# Patient Record
Sex: FEMALE | Race: WHITE | NOT HISPANIC OR LATINO
[De-identification: names, ages, dates, MRNs, and addresses within clinical notes are randomized per-mention and may not be internally consistent; named-entity substitution may affect disease eponyms.]

---

## 2017-02-02 ENCOUNTER — APPOINTMENT (OUTPATIENT)
Dept: INTERNAL MEDICINE | Facility: CLINIC | Age: 67
End: 2017-02-02

## 2017-02-02 VITALS
BODY MASS INDEX: 24.45 KG/M2 | DIASTOLIC BLOOD PRESSURE: 77 MMHG | HEIGHT: 63 IN | WEIGHT: 138 LBS | SYSTOLIC BLOOD PRESSURE: 116 MMHG | HEART RATE: 71 BPM

## 2017-02-22 ENCOUNTER — APPOINTMENT (OUTPATIENT)
Dept: INTERNAL MEDICINE | Facility: CLINIC | Age: 67
End: 2017-02-22

## 2017-03-10 ENCOUNTER — APPOINTMENT (OUTPATIENT)
Dept: HEMATOLOGY ONCOLOGY | Facility: CLINIC | Age: 67
End: 2017-03-10

## 2017-03-10 VITALS
RESPIRATION RATE: 14 BRPM | SYSTOLIC BLOOD PRESSURE: 121 MMHG | HEART RATE: 68 BPM | TEMPERATURE: 96.7 F | DIASTOLIC BLOOD PRESSURE: 69 MMHG | BODY MASS INDEX: 24.45 KG/M2 | HEIGHT: 63 IN | WEIGHT: 138 LBS

## 2017-03-10 DIAGNOSIS — Z80.2 FAMILY HISTORY OF MALIGNANT NEOPLASM OF OTHER RESPIRATORY AND INTRATHORACIC ORGANS: ICD-10-CM

## 2017-05-22 ENCOUNTER — OUTPATIENT (OUTPATIENT)
Dept: OUTPATIENT SERVICES | Facility: HOSPITAL | Age: 67
LOS: 1 days | Discharge: HOME | End: 2017-05-22

## 2017-05-22 ENCOUNTER — APPOINTMENT (OUTPATIENT)
Dept: HEMATOLOGY ONCOLOGY | Facility: CLINIC | Age: 67
End: 2017-05-22

## 2017-05-22 VITALS
HEIGHT: 63 IN | HEART RATE: 73 BPM | SYSTOLIC BLOOD PRESSURE: 128 MMHG | RESPIRATION RATE: 14 BRPM | TEMPERATURE: 97.8 F | BODY MASS INDEX: 24.45 KG/M2 | DIASTOLIC BLOOD PRESSURE: 72 MMHG | WEIGHT: 138 LBS

## 2017-05-22 DIAGNOSIS — K92.2 GASTROINTESTINAL HEMORRHAGE, UNSPECIFIED: ICD-10-CM

## 2017-06-26 ENCOUNTER — APPOINTMENT (OUTPATIENT)
Dept: BREAST CENTER | Facility: CLINIC | Age: 67
End: 2017-06-26

## 2017-06-26 VITALS
SYSTOLIC BLOOD PRESSURE: 110 MMHG | DIASTOLIC BLOOD PRESSURE: 82 MMHG | WEIGHT: 138 LBS | BODY MASS INDEX: 24.45 KG/M2 | HEIGHT: 63 IN

## 2017-06-26 DIAGNOSIS — C50.919 MALIGNANT NEOPLASM OF UNSPECIFIED SITE OF UNSPECIFIED FEMALE BREAST: ICD-10-CM

## 2017-06-28 DIAGNOSIS — Z13.820 ENCOUNTER FOR SCREENING FOR OSTEOPOROSIS: ICD-10-CM

## 2017-06-28 DIAGNOSIS — Z78.0 ASYMPTOMATIC MENOPAUSAL STATE: ICD-10-CM

## 2017-06-28 DIAGNOSIS — M89.9 DISORDER OF BONE, UNSPECIFIED: ICD-10-CM

## 2017-06-28 DIAGNOSIS — F17.200 NICOTINE DEPENDENCE, UNSPECIFIED, UNCOMPLICATED: ICD-10-CM

## 2017-07-13 ENCOUNTER — RX RENEWAL (OUTPATIENT)
Age: 67
End: 2017-07-13

## 2017-07-24 ENCOUNTER — OUTPATIENT (OUTPATIENT)
Dept: OUTPATIENT SERVICES | Facility: HOSPITAL | Age: 67
LOS: 1 days | Discharge: HOME | End: 2017-07-24

## 2017-07-24 ENCOUNTER — APPOINTMENT (OUTPATIENT)
Dept: INTERNAL MEDICINE | Facility: CLINIC | Age: 67
End: 2017-07-24

## 2017-07-24 VITALS
HEART RATE: 65 BPM | SYSTOLIC BLOOD PRESSURE: 116 MMHG | HEIGHT: 63 IN | BODY MASS INDEX: 23.57 KG/M2 | DIASTOLIC BLOOD PRESSURE: 73 MMHG | WEIGHT: 133 LBS

## 2017-07-24 DIAGNOSIS — K92.2 GASTROINTESTINAL HEMORRHAGE, UNSPECIFIED: ICD-10-CM

## 2017-07-24 DIAGNOSIS — H43.391 OTHER VITREOUS OPACITIES, RIGHT EYE: ICD-10-CM

## 2017-07-25 LAB
HCV AB S/CO SERPL IA: 0.1 S/CO
HCV AB SER QL: NONREACTIVE

## 2017-08-11 ENCOUNTER — OUTPATIENT (OUTPATIENT)
Dept: OUTPATIENT SERVICES | Facility: HOSPITAL | Age: 67
LOS: 1 days | Discharge: HOME | End: 2017-08-11

## 2017-08-11 DIAGNOSIS — K92.2 GASTROINTESTINAL HEMORRHAGE, UNSPECIFIED: ICD-10-CM

## 2017-08-14 ENCOUNTER — APPOINTMENT (OUTPATIENT)
Dept: PODIATRY | Facility: CLINIC | Age: 67
End: 2017-08-14

## 2017-08-14 ENCOUNTER — OUTPATIENT (OUTPATIENT)
Dept: OUTPATIENT SERVICES | Facility: HOSPITAL | Age: 67
LOS: 1 days | Discharge: HOME | End: 2017-08-14

## 2017-08-14 VITALS
HEIGHT: 63 IN | BODY MASS INDEX: 23.57 KG/M2 | SYSTOLIC BLOOD PRESSURE: 129 MMHG | WEIGHT: 133 LBS | DIASTOLIC BLOOD PRESSURE: 78 MMHG | HEART RATE: 62 BPM

## 2017-08-14 DIAGNOSIS — G89.29 PAIN IN LEFT FOOT: ICD-10-CM

## 2017-08-14 DIAGNOSIS — L84 CORNS AND CALLOSITIES: ICD-10-CM

## 2017-08-14 DIAGNOSIS — M21.612 BUNION OF LEFT FOOT: ICD-10-CM

## 2017-08-14 DIAGNOSIS — K92.2 GASTROINTESTINAL HEMORRHAGE, UNSPECIFIED: ICD-10-CM

## 2017-08-14 DIAGNOSIS — M79.672 PAIN IN LEFT FOOT: ICD-10-CM

## 2017-08-14 RX ORDER — NYSTATIN 100000 [USP'U]/G
100000 CREAM TOPICAL
Qty: 3 | Refills: 10 | Status: ACTIVE | COMMUNITY
Start: 2017-08-14 | End: 1900-01-01

## 2017-08-15 DIAGNOSIS — L84 CORNS AND CALLOSITIES: ICD-10-CM

## 2017-08-15 DIAGNOSIS — M79.672 PAIN IN LEFT FOOT: ICD-10-CM

## 2017-08-15 DIAGNOSIS — B35.3 TINEA PEDIS: ICD-10-CM

## 2017-08-15 DIAGNOSIS — M21.612 BUNION OF LEFT FOOT: ICD-10-CM

## 2017-08-17 ENCOUNTER — OUTPATIENT (OUTPATIENT)
Dept: OUTPATIENT SERVICES | Facility: HOSPITAL | Age: 67
LOS: 1 days | Discharge: HOME | End: 2017-08-17

## 2017-08-17 ENCOUNTER — APPOINTMENT (OUTPATIENT)
Dept: HEMATOLOGY ONCOLOGY | Facility: CLINIC | Age: 67
End: 2017-08-17

## 2017-08-17 VITALS
WEIGHT: 134 LBS | TEMPERATURE: 96.1 F | BODY MASS INDEX: 23.74 KG/M2 | SYSTOLIC BLOOD PRESSURE: 124 MMHG | RESPIRATION RATE: 14 BRPM | DIASTOLIC BLOOD PRESSURE: 69 MMHG | HEIGHT: 63 IN | HEART RATE: 64 BPM

## 2017-08-21 DIAGNOSIS — C50.111 MALIGNANT NEOPLASM OF CENTRAL PORTION OF RIGHT FEMALE BREAST: ICD-10-CM

## 2017-09-06 ENCOUNTER — APPOINTMENT (OUTPATIENT)
Dept: PODIATRY | Facility: CLINIC | Age: 67
End: 2017-09-06

## 2017-09-06 ENCOUNTER — OUTPATIENT (OUTPATIENT)
Dept: OUTPATIENT SERVICES | Facility: HOSPITAL | Age: 67
LOS: 1 days | Discharge: HOME | End: 2017-09-06

## 2017-09-06 VITALS
WEIGHT: 134 LBS | HEIGHT: 63 IN | SYSTOLIC BLOOD PRESSURE: 118 MMHG | DIASTOLIC BLOOD PRESSURE: 76 MMHG | BODY MASS INDEX: 23.74 KG/M2 | HEART RATE: 66 BPM

## 2017-09-06 DIAGNOSIS — B35.1 TINEA UNGUIUM: ICD-10-CM

## 2017-09-06 DIAGNOSIS — B35.3 TINEA PEDIS: ICD-10-CM

## 2017-09-06 DIAGNOSIS — K92.2 GASTROINTESTINAL HEMORRHAGE, UNSPECIFIED: ICD-10-CM

## 2017-10-27 ENCOUNTER — APPOINTMENT (OUTPATIENT)
Dept: GASTROENTEROLOGY | Facility: CLINIC | Age: 67
End: 2017-10-27

## 2017-10-27 ENCOUNTER — OUTPATIENT (OUTPATIENT)
Dept: OUTPATIENT SERVICES | Facility: HOSPITAL | Age: 67
LOS: 1 days | Discharge: HOME | End: 2017-10-27

## 2017-10-27 VITALS
BODY MASS INDEX: 23.92 KG/M2 | WEIGHT: 135 LBS | SYSTOLIC BLOOD PRESSURE: 113 MMHG | HEIGHT: 63 IN | DIASTOLIC BLOOD PRESSURE: 70 MMHG | HEART RATE: 65 BPM

## 2017-10-27 DIAGNOSIS — Z12.11 ENCOUNTER FOR SCREENING FOR MALIGNANT NEOPLASM OF COLON: ICD-10-CM

## 2017-10-27 DIAGNOSIS — K92.2 GASTROINTESTINAL HEMORRHAGE, UNSPECIFIED: ICD-10-CM

## 2017-10-27 RX ORDER — POLYETHYLENE GLYCOL 3350 AND ELECTROLYTES WITH LEMON FLAVOR 236; 22.74; 6.74; 5.86; 2.97 G/4L; G/4L; G/4L; G/4L; G/4L
236 POWDER, FOR SOLUTION ORAL
Qty: 1 | Refills: 0 | Status: ACTIVE | COMMUNITY
Start: 2017-10-27 | End: 1900-01-01

## 2017-10-30 ENCOUNTER — OUTPATIENT (OUTPATIENT)
Dept: OUTPATIENT SERVICES | Facility: HOSPITAL | Age: 67
LOS: 1 days | Discharge: HOME | End: 2017-10-30

## 2017-10-30 DIAGNOSIS — R92.8 OTHER ABNORMAL AND INCONCLUSIVE FINDINGS ON DIAGNOSTIC IMAGING OF BREAST: ICD-10-CM

## 2017-10-30 DIAGNOSIS — K92.2 GASTROINTESTINAL HEMORRHAGE, UNSPECIFIED: ICD-10-CM

## 2017-10-30 DIAGNOSIS — Z12.31 ENCOUNTER FOR SCREENING MAMMOGRAM FOR MALIGNANT NEOPLASM OF BREAST: ICD-10-CM

## 2017-12-13 ENCOUNTER — OUTPATIENT (OUTPATIENT)
Dept: OUTPATIENT SERVICES | Facility: HOSPITAL | Age: 67
LOS: 1 days | Discharge: HOME | End: 2017-12-13

## 2017-12-13 DIAGNOSIS — K92.2 GASTROINTESTINAL HEMORRHAGE, UNSPECIFIED: ICD-10-CM

## 2017-12-18 DIAGNOSIS — D12.8 BENIGN NEOPLASM OF RECTUM: ICD-10-CM

## 2017-12-18 DIAGNOSIS — Z12.11 ENCOUNTER FOR SCREENING FOR MALIGNANT NEOPLASM OF COLON: ICD-10-CM

## 2017-12-18 DIAGNOSIS — D12.4 BENIGN NEOPLASM OF DESCENDING COLON: ICD-10-CM

## 2017-12-18 DIAGNOSIS — K55.20 ANGIODYSPLASIA OF COLON WITHOUT HEMORRHAGE: ICD-10-CM

## 2017-12-18 DIAGNOSIS — K64.8 OTHER HEMORRHOIDS: ICD-10-CM

## 2017-12-18 DIAGNOSIS — D12.5 BENIGN NEOPLASM OF SIGMOID COLON: ICD-10-CM

## 2017-12-20 ENCOUNTER — INPATIENT (INPATIENT)
Facility: HOSPITAL | Age: 67
LOS: 2 days | Discharge: HOME | End: 2017-12-23
Attending: HOSPITALIST

## 2017-12-20 DIAGNOSIS — K92.2 GASTROINTESTINAL HEMORRHAGE, UNSPECIFIED: ICD-10-CM

## 2017-12-20 DIAGNOSIS — K55.21 ANGIODYSPLASIA OF COLON WITH HEMORRHAGE: ICD-10-CM

## 2017-12-20 DIAGNOSIS — F17.210 NICOTINE DEPENDENCE, CIGARETTES, UNCOMPLICATED: ICD-10-CM

## 2017-12-20 DIAGNOSIS — Z86.010 PERSONAL HISTORY OF COLONIC POLYPS: ICD-10-CM

## 2017-12-20 DIAGNOSIS — K63.5 POLYP OF COLON: ICD-10-CM

## 2017-12-27 DIAGNOSIS — K92.2 GASTROINTESTINAL HEMORRHAGE, UNSPECIFIED: ICD-10-CM

## 2017-12-27 DIAGNOSIS — K64.8 OTHER HEMORRHOIDS: ICD-10-CM

## 2017-12-27 DIAGNOSIS — Z85.3 PERSONAL HISTORY OF MALIGNANT NEOPLASM OF BREAST: ICD-10-CM

## 2017-12-27 DIAGNOSIS — F17.200 NICOTINE DEPENDENCE, UNSPECIFIED, UNCOMPLICATED: ICD-10-CM

## 2017-12-27 DIAGNOSIS — D62 ACUTE POSTHEMORRHAGIC ANEMIA: ICD-10-CM

## 2017-12-27 DIAGNOSIS — Z96.642 PRESENCE OF LEFT ARTIFICIAL HIP JOINT: ICD-10-CM

## 2018-01-01 ENCOUNTER — INPATIENT (INPATIENT)
Facility: HOSPITAL | Age: 68
LOS: 5 days | Discharge: HOME | End: 2018-01-07
Attending: INTERNAL MEDICINE | Admitting: DERMATOLOGY

## 2018-01-01 DIAGNOSIS — K92.2 GASTROINTESTINAL HEMORRHAGE, UNSPECIFIED: ICD-10-CM

## 2018-01-02 DIAGNOSIS — K55.20 ANGIODYSPLASIA OF COLON WITHOUT HEMORRHAGE: ICD-10-CM
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
## 2018-01-02 DIAGNOSIS — K91.840 POSTPROCEDURAL HEMORRHAGE OF A DIGESTIVE SYSTEM ORGAN OR STRUCTURE FOLLOWING A DIGESTIVE SYSTEM PROCEDURE: ICD-10-CM

## 2018-01-11 ENCOUNTER — INPATIENT (INPATIENT)
Facility: HOSPITAL | Age: 68
LOS: 7 days | Discharge: HOME | End: 2018-01-19
Attending: INTERNAL MEDICINE

## 2018-01-11 DIAGNOSIS — R50.82 POSTPROCEDURAL FEVER: ICD-10-CM

## 2018-01-11 DIAGNOSIS — K92.2 GASTROINTESTINAL HEMORRHAGE, UNSPECIFIED: ICD-10-CM

## 2018-01-11 DIAGNOSIS — J98.11 ATELECTASIS: ICD-10-CM

## 2018-01-11 DIAGNOSIS — Z79.82 LONG TERM (CURRENT) USE OF ASPIRIN: ICD-10-CM

## 2018-01-11 DIAGNOSIS — F17.210 NICOTINE DEPENDENCE, CIGARETTES, UNCOMPLICATED: ICD-10-CM

## 2018-01-11 DIAGNOSIS — Z90.710 ACQUIRED ABSENCE OF BOTH CERVIX AND UTERUS: ICD-10-CM

## 2018-01-11 DIAGNOSIS — Z85.3 PERSONAL HISTORY OF MALIGNANT NEOPLASM OF BREAST: ICD-10-CM

## 2018-01-11 DIAGNOSIS — K29.80 DUODENITIS WITHOUT BLEEDING: ICD-10-CM

## 2018-01-11 DIAGNOSIS — Z96.642 PRESENCE OF LEFT ARTIFICIAL HIP JOINT: ICD-10-CM

## 2018-01-11 DIAGNOSIS — L98.8 OTHER SPECIFIED DISORDERS OF THE SKIN AND SUBCUTANEOUS TISSUE: ICD-10-CM

## 2018-01-11 DIAGNOSIS — K63.89 OTHER SPECIFIED DISEASES OF INTESTINE: ICD-10-CM

## 2018-01-11 DIAGNOSIS — K55.21 ANGIODYSPLASIA OF COLON WITH HEMORRHAGE: ICD-10-CM

## 2018-01-11 DIAGNOSIS — K44.9 DIAPHRAGMATIC HERNIA WITHOUT OBSTRUCTION OR GANGRENE: ICD-10-CM

## 2018-01-11 DIAGNOSIS — K22.2 ESOPHAGEAL OBSTRUCTION: ICD-10-CM

## 2018-01-11 DIAGNOSIS — Z86.010 PERSONAL HISTORY OF COLONIC POLYPS: ICD-10-CM

## 2018-01-11 DIAGNOSIS — K29.70 GASTRITIS, UNSPECIFIED, WITHOUT BLEEDING: ICD-10-CM

## 2018-01-11 DIAGNOSIS — D62 ACUTE POSTHEMORRHAGIC ANEMIA: ICD-10-CM

## 2018-01-17 ENCOUNTER — APPOINTMENT (OUTPATIENT)
Dept: SURGERY | Facility: CLINIC | Age: 68
End: 2018-01-17

## 2018-01-17 ENCOUNTER — APPOINTMENT (OUTPATIENT)
Dept: INTERNAL MEDICINE | Facility: CLINIC | Age: 68
End: 2018-01-17

## 2018-01-23 ENCOUNTER — INPATIENT (INPATIENT)
Facility: HOSPITAL | Age: 68
LOS: 8 days | Discharge: HOME | End: 2018-02-01
Attending: SURGERY

## 2018-01-25 ENCOUNTER — APPOINTMENT (OUTPATIENT)
Dept: SURGERY | Facility: CLINIC | Age: 68
End: 2018-01-25

## 2018-01-25 DIAGNOSIS — K92.2 GASTROINTESTINAL HEMORRHAGE, UNSPECIFIED: ICD-10-CM

## 2018-01-25 DIAGNOSIS — L98.9 DISORDER OF THE SKIN AND SUBCUTANEOUS TISSUE, UNSPECIFIED: ICD-10-CM

## 2018-01-25 DIAGNOSIS — K29.80 DUODENITIS WITHOUT BLEEDING: ICD-10-CM

## 2018-01-25 DIAGNOSIS — K55.20 ANGIODYSPLASIA OF COLON WITHOUT HEMORRHAGE: ICD-10-CM

## 2018-01-25 DIAGNOSIS — K64.0 FIRST DEGREE HEMORRHOIDS: ICD-10-CM

## 2018-01-25 DIAGNOSIS — K22.10 ULCER OF ESOPHAGUS WITHOUT BLEEDING: ICD-10-CM

## 2018-01-25 DIAGNOSIS — R23.4 CHANGES IN SKIN TEXTURE: ICD-10-CM

## 2018-01-25 DIAGNOSIS — K29.70 GASTRITIS, UNSPECIFIED, WITHOUT BLEEDING: ICD-10-CM

## 2018-01-25 DIAGNOSIS — B36.9 SUPERFICIAL MYCOSIS, UNSPECIFIED: ICD-10-CM

## 2018-01-25 DIAGNOSIS — Z85.3 PERSONAL HISTORY OF MALIGNANT NEOPLASM OF BREAST: ICD-10-CM

## 2018-01-25 DIAGNOSIS — Z96.642 PRESENCE OF LEFT ARTIFICIAL HIP JOINT: ICD-10-CM

## 2018-01-25 DIAGNOSIS — K44.9 DIAPHRAGMATIC HERNIA WITHOUT OBSTRUCTION OR GANGRENE: ICD-10-CM

## 2018-01-25 DIAGNOSIS — Z79.82 LONG TERM (CURRENT) USE OF ASPIRIN: ICD-10-CM

## 2018-01-25 DIAGNOSIS — D62 ACUTE POSTHEMORRHAGIC ANEMIA: ICD-10-CM

## 2018-01-25 DIAGNOSIS — Z86.010 PERSONAL HISTORY OF COLONIC POLYPS: ICD-10-CM

## 2018-01-25 DIAGNOSIS — F17.200 NICOTINE DEPENDENCE, UNSPECIFIED, UNCOMPLICATED: ICD-10-CM

## 2018-01-25 DIAGNOSIS — K22.2 ESOPHAGEAL OBSTRUCTION: ICD-10-CM

## 2018-01-29 PROBLEM — B35.3 TINEA PEDIS OF BOTH FEET: Status: ACTIVE | Noted: 2017-08-14

## 2018-02-02 ENCOUNTER — APPOINTMENT (OUTPATIENT)
Dept: INTERNAL MEDICINE | Facility: CLINIC | Age: 68
End: 2018-02-02

## 2018-02-04 DIAGNOSIS — K92.2 GASTROINTESTINAL HEMORRHAGE, UNSPECIFIED: ICD-10-CM

## 2018-02-05 DIAGNOSIS — F17.210 NICOTINE DEPENDENCE, CIGARETTES, UNCOMPLICATED: ICD-10-CM

## 2018-02-05 DIAGNOSIS — Z79.82 LONG TERM (CURRENT) USE OF ASPIRIN: ICD-10-CM
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
## 2018-02-05 DIAGNOSIS — D62 ACUTE POSTHEMORRHAGIC ANEMIA: ICD-10-CM

## 2018-02-05 DIAGNOSIS — K62.5 HEMORRHAGE OF ANUS AND RECTUM: ICD-10-CM

## 2018-02-05 DIAGNOSIS — Z85.3 PERSONAL HISTORY OF MALIGNANT NEOPLASM OF BREAST: ICD-10-CM

## 2018-02-05 DIAGNOSIS — T81.30XA DISRUPTION OF WOUND, UNSPECIFIED, INITIAL ENCOUNTER: ICD-10-CM

## 2018-02-05 DIAGNOSIS — K91.840 POSTPROCEDURAL HEMORRHAGE OF A DIGESTIVE SYSTEM ORGAN OR STRUCTURE FOLLOWING A DIGESTIVE SYSTEM PROCEDURE: ICD-10-CM

## 2018-02-05 DIAGNOSIS — Z96.642 PRESENCE OF LEFT ARTIFICIAL HIP JOINT: ICD-10-CM

## 2018-02-05 DIAGNOSIS — R57.1 HYPOVOLEMIC SHOCK: ICD-10-CM

## 2018-02-12 DIAGNOSIS — K62.5 HEMORRHAGE OF ANUS AND RECTUM: ICD-10-CM

## 2018-02-22 ENCOUNTER — OUTPATIENT (OUTPATIENT)
Dept: OUTPATIENT SERVICES | Facility: HOSPITAL | Age: 68
LOS: 1 days | Discharge: HOME | End: 2018-02-22

## 2018-02-22 ENCOUNTER — APPOINTMENT (OUTPATIENT)
Dept: HEMATOLOGY ONCOLOGY | Facility: CLINIC | Age: 68
End: 2018-02-22

## 2018-02-22 VITALS
WEIGHT: 130 LBS | TEMPERATURE: 96.6 F | HEIGHT: 63 IN | RESPIRATION RATE: 14 BRPM | SYSTOLIC BLOOD PRESSURE: 104 MMHG | HEART RATE: 68 BPM | BODY MASS INDEX: 23.04 KG/M2 | DIASTOLIC BLOOD PRESSURE: 67 MMHG

## 2018-02-27 ENCOUNTER — APPOINTMENT (OUTPATIENT)
Dept: SURGERY | Facility: CLINIC | Age: 68
End: 2018-02-27
Payer: MEDICARE

## 2018-02-27 VITALS
HEIGHT: 63 IN | WEIGHT: 131 LBS | SYSTOLIC BLOOD PRESSURE: 112 MMHG | DIASTOLIC BLOOD PRESSURE: 70 MMHG | BODY MASS INDEX: 23.21 KG/M2

## 2018-02-27 DIAGNOSIS — K64.9 UNSPECIFIED HEMORRHOIDS: ICD-10-CM

## 2018-02-27 DIAGNOSIS — K62.5 HEMORRHAGE OF ANUS AND RECTUM: ICD-10-CM

## 2018-02-27 DIAGNOSIS — C50.111 MALIGNANT NEOPLASM OF CENTRAL PORTION OF RIGHT FEMALE BREAST: ICD-10-CM

## 2018-02-27 PROCEDURE — 99024 POSTOP FOLLOW-UP VISIT: CPT

## 2018-03-01 PROBLEM — K64.9 HEMORRHOIDS: Status: ACTIVE | Noted: 2018-03-01

## 2018-03-01 PROBLEM — K62.5 RECTAL BLEEDING: Status: ACTIVE | Noted: 2018-03-01

## 2018-03-09 ENCOUNTER — APPOINTMENT (OUTPATIENT)
Dept: GASTROENTEROLOGY | Facility: CLINIC | Age: 68
End: 2018-03-09

## 2018-04-30 ENCOUNTER — OUTPATIENT (OUTPATIENT)
Dept: OUTPATIENT SERVICES | Facility: HOSPITAL | Age: 68
LOS: 1 days | Discharge: HOME | End: 2018-04-30

## 2018-04-30 DIAGNOSIS — R92.8 OTHER ABNORMAL AND INCONCLUSIVE FINDINGS ON DIAGNOSTIC IMAGING OF BREAST: ICD-10-CM

## 2018-05-11 ENCOUNTER — APPOINTMENT (OUTPATIENT)
Dept: GASTROENTEROLOGY | Facility: CLINIC | Age: 68
End: 2018-05-11

## 2018-05-14 ENCOUNTER — RX RENEWAL (OUTPATIENT)
Age: 68
End: 2018-05-14

## 2018-05-14 RX ORDER — ANASTROZOLE TABLETS 1 MG/1
1 TABLET ORAL DAILY
Qty: 90 | Refills: 1 | Status: ACTIVE | COMMUNITY
Start: 2017-03-10 | End: 1900-01-01

## 2018-05-21 ENCOUNTER — INPATIENT (INPATIENT)
Facility: HOSPITAL | Age: 68
LOS: 16 days | Discharge: ANOTHER HOSPITAL | End: 2018-06-07
Attending: INTERNAL MEDICINE | Admitting: INTERNAL MEDICINE
Payer: MEDICARE

## 2018-05-21 VITALS
TEMPERATURE: 98 F | HEART RATE: 90 BPM | RESPIRATION RATE: 18 BRPM | SYSTOLIC BLOOD PRESSURE: 150 MMHG | DIASTOLIC BLOOD PRESSURE: 67 MMHG | OXYGEN SATURATION: 100 %

## 2018-05-21 DIAGNOSIS — Z96.642 PRESENCE OF LEFT ARTIFICIAL HIP JOINT: Chronic | ICD-10-CM

## 2018-05-21 DIAGNOSIS — Z98.890 OTHER SPECIFIED POSTPROCEDURAL STATES: Chronic | ICD-10-CM

## 2018-05-21 LAB
ANION GAP SERPL CALC-SCNC: 13 MMOL/L — SIGNIFICANT CHANGE UP (ref 7–14)
APTT BLD: 22.6 SEC — CRITICAL LOW (ref 27–39.2)
BASOPHILS # BLD AUTO: 0.02 K/UL — SIGNIFICANT CHANGE UP (ref 0–0.2)
BASOPHILS NFR BLD AUTO: 0.5 % — SIGNIFICANT CHANGE UP (ref 0–1)
BLD GP AB SCN SERPL QL: (no result)
BUN SERPL-MCNC: 10 MG/DL — SIGNIFICANT CHANGE UP (ref 10–20)
CALCIUM SERPL-MCNC: 9.8 MG/DL — SIGNIFICANT CHANGE UP (ref 8.5–10.1)
CHLORIDE SERPL-SCNC: 99 MMOL/L — SIGNIFICANT CHANGE UP (ref 98–110)
CK MB CFR SERPL CALC: <0.1 NG/ML — LOW (ref 0.6–6.3)
CK SERPL-CCNC: 42 U/L — SIGNIFICANT CHANGE UP (ref 0–225)
CO2 SERPL-SCNC: 27 MMOL/L — SIGNIFICANT CHANGE UP (ref 17–32)
CREAT SERPL-MCNC: 0.7 MG/DL — SIGNIFICANT CHANGE UP (ref 0.7–1.5)
D DIMER BLD IA.RAPID-MCNC: 1598 NG/ML DDU — HIGH (ref 0–230)
EOSINOPHIL # BLD AUTO: 0.1 K/UL — SIGNIFICANT CHANGE UP (ref 0–0.7)
EOSINOPHIL NFR BLD AUTO: 2.7 % — SIGNIFICANT CHANGE UP (ref 0–8)
GLUCOSE SERPL-MCNC: 99 MG/DL — SIGNIFICANT CHANGE UP (ref 70–99)
HCT VFR BLD CALC: 16.1 % — LOW (ref 37–47)
HCT VFR BLD CALC: 18.7 % — LOW (ref 37–47)
HGB BLD-MCNC: 5.2 G/DL — CRITICAL LOW (ref 12–16)
HGB BLD-MCNC: 5.8 G/DL — CRITICAL LOW (ref 12–16)
IMM GRANULOCYTES NFR BLD AUTO: 17.8 % — HIGH (ref 0.1–0.3)
INR BLD: 1.19 RATIO — SIGNIFICANT CHANGE UP (ref 0.65–1.3)
LYMPHOCYTES # BLD AUTO: 1.32 K/UL — SIGNIFICANT CHANGE UP (ref 1.2–3.4)
LYMPHOCYTES # BLD AUTO: 36.1 % — SIGNIFICANT CHANGE UP (ref 20.5–51.1)
MCHC RBC-ENTMCNC: 28 PG — SIGNIFICANT CHANGE UP (ref 27–31)
MCHC RBC-ENTMCNC: 29.9 PG — SIGNIFICANT CHANGE UP (ref 27–31)
MCHC RBC-ENTMCNC: 31 G/DL — LOW (ref 32–37)
MCHC RBC-ENTMCNC: 32.3 G/DL — SIGNIFICANT CHANGE UP (ref 32–37)
MCV RBC AUTO: 90.3 FL — SIGNIFICANT CHANGE UP (ref 81–99)
MCV RBC AUTO: 92.5 FL — SIGNIFICANT CHANGE UP (ref 81–99)
MONOCYTES # BLD AUTO: 0.1 K/UL — SIGNIFICANT CHANGE UP (ref 0.1–0.6)
MONOCYTES NFR BLD AUTO: 2.7 % — SIGNIFICANT CHANGE UP (ref 1.7–9.3)
NEUTROPHILS # BLD AUTO: 1.47 K/UL — SIGNIFICANT CHANGE UP (ref 1.4–6.5)
NEUTROPHILS NFR BLD AUTO: 40.2 % — LOW (ref 42.2–75.2)
NRBC # BLD: 13 /100 WBCS — HIGH (ref 0–0)
NT-PROBNP SERPL-SCNC: 246 PG/ML — SIGNIFICANT CHANGE UP (ref 0–300)
PLATELET # BLD AUTO: 117 K/UL — LOW (ref 130–400)
PLATELET # BLD AUTO: 128 K/UL — LOW (ref 130–400)
POTASSIUM SERPL-MCNC: 4.4 MMOL/L — SIGNIFICANT CHANGE UP (ref 3.5–5)
POTASSIUM SERPL-SCNC: 4.4 MMOL/L — SIGNIFICANT CHANGE UP (ref 3.5–5)
PROTHROM AB SERPL-ACNC: 12.9 SEC — HIGH (ref 9.95–12.87)
RBC # BLD: 1.74 M/UL — LOW (ref 4.2–5.4)
RBC # BLD: 1.74 M/UL — LOW (ref 4.2–5.4)
RBC # BLD: 2.07 M/UL — LOW (ref 4.2–5.4)
RBC # FLD: 21.5 % — HIGH (ref 11.5–14.5)
RBC # FLD: 22.1 % — HIGH (ref 11.5–14.5)
RETICS #: 104.6 K/UL — SIGNIFICANT CHANGE UP (ref 25–125)
RETICS/RBC NFR: 6 % — HIGH (ref 0.5–1.5)
SODIUM SERPL-SCNC: 139 MMOL/L — SIGNIFICANT CHANGE UP (ref 135–146)
TROPONIN T SERPL-MCNC: <0.01 NG/ML — SIGNIFICANT CHANGE UP
TYPE + AB SCN PNL BLD: SIGNIFICANT CHANGE UP
WBC # BLD: 3.66 K/UL — LOW (ref 4.8–10.8)
WBC # BLD: 3.98 K/UL — LOW (ref 4.8–10.8)
WBC # FLD AUTO: 3.66 K/UL — LOW (ref 4.8–10.8)
WBC # FLD AUTO: 3.98 K/UL — LOW (ref 4.8–10.8)

## 2018-05-21 RX ORDER — ACETAMINOPHEN 500 MG
650 TABLET ORAL ONCE
Qty: 0 | Refills: 0 | Status: COMPLETED | OUTPATIENT
Start: 2018-05-21 | End: 2018-05-21

## 2018-05-21 RX ORDER — ACETAMINOPHEN 500 MG
650 TABLET ORAL EVERY 6 HOURS
Qty: 0 | Refills: 0 | Status: DISCONTINUED | OUTPATIENT
Start: 2018-05-21 | End: 2018-05-25

## 2018-05-21 RX ORDER — ANASTROZOLE 1 MG/1
1 TABLET ORAL DAILY
Qty: 0 | Refills: 0 | Status: DISCONTINUED | OUTPATIENT
Start: 2018-05-22 | End: 2018-06-07

## 2018-05-21 RX ORDER — BUDESONIDE AND FORMOTEROL FUMARATE DIHYDRATE 160; 4.5 UG/1; UG/1
2 AEROSOL RESPIRATORY (INHALATION)
Qty: 0 | Refills: 0 | Status: DISCONTINUED | OUTPATIENT
Start: 2018-05-21 | End: 2018-06-07

## 2018-05-21 RX ORDER — PANTOPRAZOLE SODIUM 20 MG/1
40 TABLET, DELAYED RELEASE ORAL
Qty: 0 | Refills: 0 | Status: DISCONTINUED | OUTPATIENT
Start: 2018-05-21 | End: 2018-05-23

## 2018-05-21 RX ORDER — ALBUTEROL 90 UG/1
2.5 AEROSOL, METERED ORAL EVERY 6 HOURS
Qty: 0 | Refills: 0 | Status: DISCONTINUED | OUTPATIENT
Start: 2018-05-21 | End: 2018-06-07

## 2018-05-21 RX ADMIN — Medication 650 MILLIGRAM(S): at 18:13

## 2018-05-21 RX ADMIN — BUDESONIDE AND FORMOTEROL FUMARATE DIHYDRATE 2 PUFF(S): 160; 4.5 AEROSOL RESPIRATORY (INHALATION) at 23:57

## 2018-05-21 RX ADMIN — Medication 650 MILLIGRAM(S): at 23:30

## 2018-05-21 RX ADMIN — Medication 650 MILLIGRAM(S): at 23:58

## 2018-05-21 NOTE — ED PROVIDER NOTE - OBJECTIVE STATEMENT
cough x 2 weeks. Non productive. No fever or chills or SOB. Pt stopped smoking 4 weeks ago. Also has sharp left CP that is constant x 3 days. Pain is not worsened with coughing, ROM or exertion. Denies leg pain or swelling.

## 2018-05-21 NOTE — ED PROVIDER NOTE - ATTENDING CONTRIBUTION TO CARE
67 F with hx of GI bleed due to polyp removal, transfusion, breast CA in remission on daily maintenance chemo, recent ex smoker here with cough, and upper L upper chest pain. No leg pain or swelling, no fever, no recent travel, no cardiac hx/HTN/DM.   exam nonrevealing, no skin changes, no chest tenderness. Labs CXR EKG and reeval

## 2018-05-21 NOTE — ED ADULT NURSE NOTE - OBJECTIVE STATEMENT
Pt c/o cough for about 4 months since quitting smoking. Also c/o pain to left upper chest for several months. Pt denies SOB/headache/dizziness.

## 2018-05-21 NOTE — H&P ADULT - ASSESSMENT
66 yo female patient with PMHx of right breast cancer (Well to moderately differentiated invasive ductal carcinoma ER+, [place]+, Her-2 neg, stage T1c, N0, M0) s/p lumpectomy around 1.5 years ago, followed by radiation therapy and currently maintained on anastrazole, history of lower GI bleeding back in January 2018 which was attributed to hemorrhoids, s/p hemorrhoidectomy and fissurotomy, currently presented because of worsening generalized weakness, dyspnea on exertion and cough was found to have a Hg 5.8.    Admitted to medicine for management and evaluation    # Working diagnosis: Profound symptomatic anemia  Patient was found to be Pancytopenic.  Patient has history of breast cancer, currently on anastrozole, and has history of lower GI bleeding few months ago, and was found to have positive guaiac in ED.  Anemia can have many etiologies in this specific patient:  1- Can be secondary to chronic slow GI losses (positive guaiac, history of hemorrhoids, diverticulosis, and gastritis)  2- Can be secondary to anastrozole (<10% chance of having anemia and leukopenia)  3- Can be secondary to malignancy (infiltration of bone marrow by malignant cells)  4- Can be MDS since patient is pancytopenic?  5- Can be vitamin deficiency (possibly B12) - can cause pancytopenia not only anemia (On a previous CBC back in january patient was found to have macrocytic anemia with MVC>100 and on the peripheral smear it said r/o b12 deficiency)  6- Can be other causes of anemia/pancytopenia such as hypothyroidism    Will need to transfuse patient for a Hg>7 ; but blood bank called saying she had positive antibodies and they have no blood currently available for her.  We need to send two pink tubes outside (to [place] blood bank) in order to check for compatible blood. Meanwhile will keep patient monitored, if any signs of instability, will have to transfuse group O- uncrossed blood. But for now patient is asymptomatic while at rest, no shortness of breath, no dizziness. She is not tachycardic nor hypotensive.  Will need to get GI and hematology/oncology on board for evaluation  I will send blood for TSH, iron studies, vit b12, folate, SPEP, UPEP, ESR, peripheral smear, retic count    # Cough and exertional shortness of breath  1- SOB can be because of the anemia  2- Patient might have non diagnosed COPD (chronic smoking 50 pack year, stopped only 4 months ago)  3- possible acute bronchitis / viral upper resp tract infection  Will continue with symptomatic treatment only. no indication of antibiotics. will give some nebulizers and assess for symptoms improvement.  Consider Pulmonary consult (can be done outpatient) for PFTs r/o copd    # History of buttock skin lesion s/p excision during hemorrhoidectomy surgery  Pathology showing malignancy of undetermined origin, clean margins of excision  Needs to follow up with oncology and address this issue too    # Breast cancer s/p lumpectomy and currently on anastrozole  continue with anastrozole for now unless oncology would stop it for now.  Supposidly patient was in remission, but on the current CT chest there was some axillary lymph nodes. Patient complaining of pain at the site.  R/O recurrence of disease, might require biopsy. evaluate by oncology, consider surgery consult if biopsy needed.  Might need new staging work-up    # DVT proph (encourage ambulation, sequentials) no heparin, possible GI losses  GI prophylaxis  Regular diet  ambulate as tolerated  Full code  dispo home 68 yo female patient with PMHx of right breast cancer (Well to moderately differentiated invasive ductal carcinoma ER+, [place]+, Her-2 neg, stage T1c, N0, M0) s/p lumpectomy around 1.5 years ago, followed by radiation therapy and currently maintained on anastrazole, history of lower GI bleeding back in January 2018 which was attributed to hemorrhoids, s/p hemorrhoidectomy and fissurotomy, currently presented because of worsening generalized weakness, dyspnea on exertion and cough was found to have a Hg 5.8.    Admitted to medicine for management and evaluation    # Working diagnosis: Profound symptomatic anemia  Patient was found to be Pancytopenic.  Patient has history of breast cancer, currently on anastrozole, and has history of lower GI bleeding few months ago, and was found to have positive guaiac in ED.  Anemia can have many etiologies in this specific patient:  1- Can be secondary to chronic slow GI losses (positive guaiac, history of hemorrhoids, diverticulosis, and gastritis)  2- Can be secondary to anastrozole (<10% chance of having anemia and leukopenia)  3- Can be secondary to malignancy (infiltration of bone marrow by malignant cells)  4- Can be MDS since patient is pancytopenic?  5- Can be vitamin deficiency (possibly B12) - can cause pancytopenia not only anemia (On a previous CBC back in january patient was found to have macrocytic anemia with MVC>100 and on the peripheral smear it said r/o b12 deficiency)  6- Can be other causes of anemia/pancytopenia such as hypothyroidism    Will need to transfuse patient for a Hg>7 ; but blood bank called saying she had positive antibodies and they have no blood currently available for her.  We need to send two pink tubes outside (to [place] blood bank) in order to check for compatible blood. Meanwhile will keep patient monitored, if any signs of instability, will have to transfuse group O- uncrossed blood. But for now patient is asymptomatic while at rest, no shortness of breath, no dizziness. She is not tachycardic nor hypotensive.  Will need to get GI and hematology/oncology on board for evaluation  I will send blood for TSH, iron studies, vit b12, folate, SPEP, UPEP, ESR, peripheral smear, retic count    # Cough and exertional shortness of breath  1- SOB can be because of the anemia  2- Patient might have non diagnosed COPD (chronic smoking 50 pack year, stopped only 4 months ago)  3- possible acute bronchitis / viral upper resp tract infection  Will continue with symptomatic treatment only. no indication of antibiotics. will give some nebulizers and assess for symptoms improvement.  Consider Pulmonary consult (can be done outpatient) for PFTs r/o copd    # History of buttock skin lesion s/p excision during hemorrhoidectomy surgery  Pathology showing malignancy of undetermined origin, clean margins of excision  Needs to follow up with oncology and address this issue too    # Breast cancer s/p lumpectomy and currently on anastrozole  continue with anastrozole for now unless oncology would stop it for now.  Supposidly patient was in remission, but on the current CT chest there was some axillary lymph nodes. Patient complaining of pain at the site.  R/O recurrence of disease, might require biopsy. evaluate by oncology, consider surgery consult if biopsy needed.  Might need new staging work-up  Note that patient is complaining of headaches, consider MRI brain r/o mets    # DVT proph (encourage ambulation, sequentials) no heparin, possible GI losses  GI prophylaxis  Regular diet  ambulate as tolerated  Full code  dispo home

## 2018-05-21 NOTE — H&P ADULT - NSHPREVIEWOFSYSTEMS_GEN_ALL_CORE
No fever chills, no rhinorrhea, no sore throat, no chest pain, no abdominal pain, no nausea vomiting or diarrhea, no  symptoms. No melena  Positive for headaches, cough, exertional SOB, generalized weakness, left armpit and left chest pain

## 2018-05-21 NOTE — ED PROVIDER NOTE - NS ED ROS FT
CONST: No fever, chills or bodyaches  EYES: No pain, redness, drainage or visual changes.  ENT: No ear pain or discharge, nasal discharge or congestion. No sore throat    GI: No abdominal pain, N/V/D  MS: No joint pain, back pain or extremity pain/injury  SKIN: No rashes  NEURO: No headache, dizziness, paresthesias or LOC

## 2018-05-21 NOTE — H&P ADULT - NSHPSOCIALHISTORY_GEN_ALL_CORE
Social History:  Substance Use (street drugs): ( x ) never used  (  ) other:  Tobacco Usage:  (   ) never smoked   ( x ) former smoker   (   ) current smoker  (  52   ) pack year  (   4 months ago     ) last cigarette date  Alcohol Usage: negative

## 2018-05-21 NOTE — ED ADULT NURSE REASSESSMENT NOTE - NS ED NURSE REASSESS COMMENT FT1
Call to blood bank requesting availability of ordered PRBC's, Blood bank rep Sol advised ordered units would not be ready for at least an hour, MD aware no new orders given

## 2018-05-21 NOTE — H&P ADULT - NSHPPHYSICALEXAM_GEN_ALL_CORE
PHYSICAL EXAM:    T(F): 97.4, Max: 98.1 (05-21-18 @ 09:26)  HR: 83  BP: 122/61  RR: 17  SpO2: 100%    GENERAL: NAD, well-developed  HEAD:  Atraumatic, Normocephalic  EYES: poorly injected conjunctiva  NECK: Supple, No JVD  CHEST/LUNG: Clear to auscultation bilaterally; No wheeze  HEART: Regular rate and rhythm; No murmurs, rubs, or gallops  ABDOMEN: Soft, Nontender, Nondistended; Bowel sounds present  EXTREMITIES:  2+ Peripheral Pulses, No clubbing, cyanosis, or edema  PSYCH: AAOx3  NEUROLOGY: non-focal  SKIN: No rashes or lesions

## 2018-05-21 NOTE — H&P ADULT - NSHPLABSRESULTS_GEN_ALL_CORE
5.8    3.98  )-----------( 117      ( 21 May 2018 11:09 )             18.7       139  |  99  |  10  ----------------------------<  99  4.4   |  27  |  0.7    Ca    9.8      21 May 2018 11:09    CARDIAC MARKERS ( 21 May 2018 11:09 )  x     / <0.01 ng/mL / 42 U/L / x     / <0.1 ng/mL    ECG: Normal    < from: CT Angio Chest w/ IV Cont (05.21.18 @ 13:56) >      IMPRESSION:    1.  No pulmonary embolus.   2.  Multiple bilateral subcentimeter axillary lymph nodes and prominent   left axillary lymph node measuring up to 1.2 cm in short axis,   nonspecific. Postsurgical clips along the right axilla and breast.    < end of copied text >

## 2018-05-21 NOTE — ED PROVIDER NOTE - PHYSICAL EXAMINATION
CONST: Well appearing in NAD  EYES: PERRL, EOMI, Sclera and conjunctiva clear.   ENT: No nasal discharge. TM's clear B/L without drainage. Oropharynx normal appearing, no erythema or exudates. Uvula midline.  NECK: Non-tender, no meningeal signs  CARD: Normal S1 S2; Normal rate and rhythm  RESP: Equal BS B/L, No wheezes, rhonchi or rales. No distress  GI: Soft, non-tender, non-distended.  MS: Normal ROM in all extremities. No midline spinal tenderness. No calf tenderness or swelling  SKIN: Warm, dry, no acute rashes. Good turgor  NEURO: A&Ox3, No focal deficits. Strength 5/5 with no sensory deficits. Steady gait CONST: Well appearing in NAD  EYES: PERRL, EOMI, Sclera and conjunctiva clear.   ENT: No nasal discharge. TM's clear B/L without drainage. Oropharynx normal appearing, no erythema or exudates. Uvula midline.  NECK: Non-tender, no meningeal signs  CARD: Normal S1 S2; Normal rate and rhythm  RESP: Equal BS B/L, No wheezes, rhonchi or rales. No distress  GI: Soft, non-tender, non-distended.  MS: Normal ROM in all extremities. No midline spinal tenderness. No calf tenderness or swelling  SKIN: Warm, dry, no acute rashes. Good turgor  NEURO: A&Ox3, No focal deficits. Strength 5/5 with no sensory deficits. Steady gait  Rectal: brown stool that is Guiac Positive

## 2018-05-22 LAB
ALBUMIN SERPL ELPH-MCNC: 3.6 G/DL — SIGNIFICANT CHANGE UP (ref 3.5–5.2)
ALP SERPL-CCNC: 315 U/L — HIGH (ref 30–115)
ALT FLD-CCNC: 39 U/L — SIGNIFICANT CHANGE UP (ref 0–41)
ANION GAP SERPL CALC-SCNC: 16 MMOL/L — HIGH (ref 7–14)
AST SERPL-CCNC: 47 U/L — HIGH (ref 0–41)
BASOPHILS # BLD AUTO: 0.02 K/UL — SIGNIFICANT CHANGE UP (ref 0–0.2)
BASOPHILS NFR BLD AUTO: 0.6 % — SIGNIFICANT CHANGE UP (ref 0–1)
BILIRUB SERPL-MCNC: 1.1 MG/DL — SIGNIFICANT CHANGE UP (ref 0.2–1.2)
BUN SERPL-MCNC: 10 MG/DL — SIGNIFICANT CHANGE UP (ref 10–20)
CALCIUM SERPL-MCNC: 8.4 MG/DL — LOW (ref 8.5–10.1)
CHLORIDE SERPL-SCNC: 97 MMOL/L — LOW (ref 98–110)
CHOLEST SERPL-MCNC: 128 MG/DL — SIGNIFICANT CHANGE UP (ref 100–200)
CO2 SERPL-SCNC: 24 MMOL/L — SIGNIFICANT CHANGE UP (ref 17–32)
CREAT SERPL-MCNC: 0.6 MG/DL — LOW (ref 0.7–1.5)
EOSINOPHIL # BLD AUTO: 0.1 K/UL — SIGNIFICANT CHANGE UP (ref 0–0.7)
EOSINOPHIL NFR BLD AUTO: 3 % — SIGNIFICANT CHANGE UP (ref 0–8)
ERYTHROCYTE [SEDIMENTATION RATE] IN BLOOD: 140 MM/HR — HIGH (ref 0–20)
ESTIMATED AVERAGE GLUCOSE: 114 MG/DL — SIGNIFICANT CHANGE UP (ref 68–114)
FERRITIN SERPL-MCNC: 1409 NG/ML — HIGH (ref 15–150)
FOLATE SERPL-MCNC: >20 NG/ML — SIGNIFICANT CHANGE UP
GLUCOSE SERPL-MCNC: 162 MG/DL — HIGH (ref 70–99)
HBA1C BLD-MCNC: 5.6 % — SIGNIFICANT CHANGE UP (ref 4–5.6)
HCT VFR BLD CALC: 23.4 % — LOW (ref 37–47)
HCT VFR BLD CALC: 23.7 % — LOW (ref 37–47)
HDLC SERPL-MCNC: 29 MG/DL — LOW (ref 40–125)
HGB BLD-MCNC: 7.6 G/DL — LOW (ref 12–16)
HGB BLD-MCNC: 8 G/DL — LOW (ref 12–16)
IMM GRANULOCYTES NFR BLD AUTO: 17.5 % — HIGH (ref 0.1–0.3)
IRON SATN MFR SERPL: 20 % — SIGNIFICANT CHANGE UP (ref 15–50)
IRON SATN MFR SERPL: 58 UG/DL — SIGNIFICANT CHANGE UP (ref 35–150)
LIPID PNL WITH DIRECT LDL SERPL: 63 MG/DL — SIGNIFICANT CHANGE UP (ref 4–129)
LYMPHOCYTES # BLD AUTO: 0.84 K/UL — LOW (ref 1.2–3.4)
LYMPHOCYTES # BLD AUTO: 24.9 % — SIGNIFICANT CHANGE UP (ref 20.5–51.1)
MCHC RBC-ENTMCNC: 27.5 PG — SIGNIFICANT CHANGE UP (ref 27–31)
MCHC RBC-ENTMCNC: 30 PG — SIGNIFICANT CHANGE UP (ref 27–31)
MCHC RBC-ENTMCNC: 32.1 G/DL — SIGNIFICANT CHANGE UP (ref 32–37)
MCHC RBC-ENTMCNC: 34.2 G/DL — SIGNIFICANT CHANGE UP (ref 32–37)
MCV RBC AUTO: 85.9 FL — SIGNIFICANT CHANGE UP (ref 81–99)
MCV RBC AUTO: 87.6 FL — SIGNIFICANT CHANGE UP (ref 81–99)
MONOCYTES # BLD AUTO: 0.27 K/UL — SIGNIFICANT CHANGE UP (ref 0.1–0.6)
MONOCYTES NFR BLD AUTO: 8 % — SIGNIFICANT CHANGE UP (ref 1.7–9.3)
NEUTROPHILS # BLD AUTO: 1.56 K/UL — SIGNIFICANT CHANGE UP (ref 1.4–6.5)
NEUTROPHILS NFR BLD AUTO: 46 % — SIGNIFICANT CHANGE UP (ref 42.2–75.2)
NRBC # BLD: 15 /100 WBCS — HIGH (ref 0–0)
PLATELET # BLD AUTO: 110 K/UL — LOW (ref 130–400)
PLATELET # BLD AUTO: 115 K/UL — LOW (ref 130–400)
POTASSIUM SERPL-MCNC: 4.3 MMOL/L — SIGNIFICANT CHANGE UP (ref 3.5–5)
POTASSIUM SERPL-SCNC: 4.3 MMOL/L — SIGNIFICANT CHANGE UP (ref 3.5–5)
PROT SERPL-MCNC: 7.3 G/DL — SIGNIFICANT CHANGE UP (ref 6–8)
PROT SERPL-MCNC: 7.7 G/DL — SIGNIFICANT CHANGE UP (ref 6–8.3)
PROT SERPL-MCNC: 7.7 G/DL — SIGNIFICANT CHANGE UP (ref 6–8.3)
RBC # BLD: 2.67 M/UL — LOW (ref 4.2–5.4)
RBC # BLD: 2.76 M/UL — LOW (ref 4.2–5.4)
RBC # FLD: 20.1 % — HIGH (ref 11.5–14.5)
RBC # FLD: 20.5 % — HIGH (ref 11.5–14.5)
SODIUM SERPL-SCNC: 137 MMOL/L — SIGNIFICANT CHANGE UP (ref 135–146)
TIBC SERPL-MCNC: 287 UG/DL — SIGNIFICANT CHANGE UP (ref 220–430)
TOTAL CHOLESTEROL/HDL RATIO MEASUREMENT: 4.4 RATIO — SIGNIFICANT CHANGE UP (ref 4–5.5)
TRIGL SERPL-MCNC: 189 MG/DL — HIGH (ref 10–149)
TSH SERPL-MCNC: 1.77 UIU/ML — SIGNIFICANT CHANGE UP (ref 0.27–4.2)
UIBC SERPL-MCNC: 229 UG/DL — SIGNIFICANT CHANGE UP (ref 110–370)
VIT B12 SERPL-MCNC: 1350 PG/ML — HIGH (ref 232–1245)
WBC # BLD: 3.38 K/UL — LOW (ref 4.8–10.8)
WBC # BLD: 3.91 K/UL — LOW (ref 4.8–10.8)
WBC # FLD AUTO: 3.38 K/UL — LOW (ref 4.8–10.8)
WBC # FLD AUTO: 3.91 K/UL — LOW (ref 4.8–10.8)

## 2018-05-22 RX ORDER — OXYCODONE AND ACETAMINOPHEN 5; 325 MG/1; MG/1
1 TABLET ORAL EVERY 6 HOURS
Qty: 0 | Refills: 0 | Status: DISCONTINUED | OUTPATIENT
Start: 2018-05-22 | End: 2018-05-22

## 2018-05-22 RX ORDER — NICOTINE POLACRILEX 2 MG
1 GUM BUCCAL DAILY
Qty: 0 | Refills: 0 | Status: DISCONTINUED | OUTPATIENT
Start: 2018-05-22 | End: 2018-06-07

## 2018-05-22 RX ORDER — LANOLIN ALCOHOL/MO/W.PET/CERES
5 CREAM (GRAM) TOPICAL AT BEDTIME
Qty: 0 | Refills: 0 | Status: COMPLETED | OUTPATIENT
Start: 2018-05-22 | End: 2018-05-22

## 2018-05-22 RX ADMIN — OXYCODONE AND ACETAMINOPHEN 1 TABLET(S): 5; 325 TABLET ORAL at 10:40

## 2018-05-22 RX ADMIN — ALBUTEROL 2.5 MILLIGRAM(S): 90 AEROSOL, METERED ORAL at 08:49

## 2018-05-22 RX ADMIN — OXYCODONE AND ACETAMINOPHEN 1 TABLET(S): 5; 325 TABLET ORAL at 21:37

## 2018-05-22 RX ADMIN — ALBUTEROL 2.5 MILLIGRAM(S): 90 AEROSOL, METERED ORAL at 20:50

## 2018-05-22 RX ADMIN — BUDESONIDE AND FORMOTEROL FUMARATE DIHYDRATE 2 PUFF(S): 160; 4.5 AEROSOL RESPIRATORY (INHALATION) at 09:50

## 2018-05-22 RX ADMIN — Medication 5 MILLIGRAM(S): at 21:08

## 2018-05-22 RX ADMIN — PANTOPRAZOLE SODIUM 40 MILLIGRAM(S): 20 TABLET, DELAYED RELEASE ORAL at 05:30

## 2018-05-22 RX ADMIN — ANASTROZOLE 1 MILLIGRAM(S): 1 TABLET ORAL at 12:37

## 2018-05-22 RX ADMIN — BUDESONIDE AND FORMOTEROL FUMARATE DIHYDRATE 2 PUFF(S): 160; 4.5 AEROSOL RESPIRATORY (INHALATION) at 21:06

## 2018-05-22 RX ADMIN — OXYCODONE AND ACETAMINOPHEN 1 TABLET(S): 5; 325 TABLET ORAL at 02:35

## 2018-05-22 RX ADMIN — OXYCODONE AND ACETAMINOPHEN 1 TABLET(S): 5; 325 TABLET ORAL at 09:50

## 2018-05-22 RX ADMIN — OXYCODONE AND ACETAMINOPHEN 1 TABLET(S): 5; 325 TABLET ORAL at 21:07

## 2018-05-22 RX ADMIN — OXYCODONE AND ACETAMINOPHEN 1 TABLET(S): 5; 325 TABLET ORAL at 03:00

## 2018-05-22 NOTE — CONSULT NOTE ADULT - ASSESSMENT
68 yo female patient with PMHx of right breast cancer (Well to moderately differentiated invasive ductal carcinoma ER+, [place]+, Her-2 neg, stage T1c, N0, M0) s/p lumpectomy around 1.5 years ago, followed by radiation therapy and currently maintained on anastrazole, history of life-threatening lower GI bleeding 2/2 hemorrhoids s/p hemorrhoidectomy and fissurotomy by Dr Ocampo s/p reinforcement of suture lines in 1/2018 presenting with generalized weakness, dyspnea on exertion and dark stools s/p NSAIDs intake.    *Dark stools / fatigue / + NSAIDs use   likely 2/2 NSAIDs induced PUD  no evidence of active GI bleeding   ALEKSANDRA empty vault   cbc q12h   2 x 18 gauge iv   s/p 2 u of PRBCs   PPI drip   avoid NSAIDs   EGD in am     *Pancytopenia   likely multifactorial   Hb drop likely from possible additional GI bleed  anastrozole likely a contributing factor   Hematology f/u 68 yo female patient with PMHx of right breast cancer (Well to moderately differentiated invasive ductal carcinoma ER+, [place]+, Her-2 neg, stage T1c, N0, M0) s/p lumpectomy around 1.5 years ago, followed by radiation therapy and currently maintained on anastrazole, history of life-threatening lower GI bleeding 2/2 hemorrhoids s/p hemorrhoidectomy and fissurotomy by Dr Ocampo s/p reinforcement of suture lines in 1/2018 presenting with generalized weakness, dyspnea on exertion and dark stools s/p NSAIDs intake.    *Dark stools / fatigue / + NSAIDs use   likely 2/2 NSAIDs induced PUD  no evidence of active GI bleeding   ALEKSANDRA empty vault   cbc q12h   2 x 18 gauge iv   s/p 2 u of PRBCs   PPI drip   avoid NSAIDs   EGD in am     *Pancytopenia   likely multifactorial   Hb drop likely from possible additional GI bleed  anastrozole likely a contributing factor   Hematology f/u       *Malignant skin lesion on buttock   free margins   Hemonc f/u 66 yo female patient with PMHx of right breast cancer (Well to moderately differentiated invasive ductal carcinoma ER+, [place]+, Her-2 neg, stage T1c, N0, M0) s/p lumpectomy around 1.5 years ago, followed by radiation therapy and currently maintained on anastrazole, history of life-threatening lower GI bleeding 2/2 hemorrhoids s/p hemorrhoidectomy and fissurotomy by Dr Ocampo s/p reinforcement of suture lines in 1/2018 presenting with generalized weakness, dyspnea on exertion and dark stools s/p NSAIDs intake.    *Dark stools / fatigue / + NSAIDs use   likely 2/2 NSAIDs induced PUD  no evidence of active GI bleeding   ALEKSANDRA empty vault   - cbc q12h   - 2 x 18 gauge iv   s/p 2 u of PRBCs   - PPI BID  -avoid NSAIDs   - EGD in am     *Pancytopenia   likely multifactorial   Hb drop likely from possible additional GI bleed  anastrozole likely a contributing factor   Hematology f/u

## 2018-05-22 NOTE — PROGRESS NOTE ADULT - ASSESSMENT
# Working diagnosis: symptomatic anemia  Patient was found to be Pancytopenic.  Patient has history of breast cancer, currently on anastrozole, and has history of lower GI bleeding few months ago, and was found to have positive guaiac in ED.  1- Can be secondary to chronic slow GI losses (positive guaiac, history of hemorrhoids, diverticulosis, and gastritis)  2- Can be secondary to anastrozole (<10% chance of having anemia and leukopenia)  3- Can be secondary to malignancy (infiltration of bone marrow by malignant cells)  4- Can be MDS since patient is pancytopenic?  5- Can be vitamin deficiency (possibly B12) - can cause pancytopenia not only anemia (On a previous CBC back in january patient was found to have macrocytic anemia with MVC>100 and on the peripheral smear it said r/o b12 deficiency)  6- Can be other causes of anemia/pancytopenia such as hypothyroidism    Will need to transfuse patient for a Hg>7 ; but blood bank called saying she had positive antibodies and they have no blood currently available for her.  We need to send two pink tubes outside (to [place] blood bank) in order to check for compatible blood. Meanwhile will keep patient monitored, if any signs of instability, will have to transfuse group O- uncrossed blood. But for now patient is asymptomatic while at rest, no shortness of breath, no dizziness. She is not tachycardic nor hypotensive.  Will need to get GI and hematology/oncology on board for evaluation  f/u TSH, iron studies, vit b12, folate, SPEP, UPEP, , peripheral smear, retic count 1.67, 6%  - repeat CBC 22:53 Hb=5.2, getting 2 u prbc    # Cough and exertional shortness of breath  1- SOB can be because of the anemia  2- Patient might have non diagnosed COPD (chronic smoking 50 pack year, stopped only 4 months ago)  3- possible acute bronchitis / viral upper resp tract infection  Will continue with symptomatic treatment only. no indication of antibiotics. will give some nebulizers and assess for symptoms improvement.  Consider Pulmonary consult (can be done outpatient) for PFTs r/o copd    # History of buttock skin lesion s/p excision during hemorrhoidectomy surgery  Pathology showing malignancy of undetermined origin, clean margins of excision  Needs to follow up with oncology and address this issue too    # Breast cancer s/p lumpectomy and currently on anastrozole  continue with anastrozole for now unless oncology would stop it for now.  Supposidly patient was in remission, but on the current CT chest there was some axillary lymph nodes. Patient complaining of pain at the site.  R/O recurrence of disease, might require biopsy. evaluate by oncology, consider surgery consult if biopsy needed.  Might need new staging work-up  Note that patient is complaining of headaches, consider MRI brain r/o mets    # DVT proph (encourage ambulation, sequentials) no heparin, possible GI losses  GI prophylaxis  Regular diet  ambulate as tolerated  Full code  dispo home # Working diagnosis: symptomatic anemia  Patient was found to be Pancytopenic.  Patient has history of breast cancer, currently on anastrozole, and has history of lower GI bleeding few months ago, and was found to have positive guaiac in ED.  1- Can be secondary to chronic slow GI losses (positive guaiac, history of hemorrhoids, diverticulosis, and gastritis)  2- Can be secondary to anastrozole (<10% chance of having anemia and leukopenia)  3- Can be secondary to malignancy (infiltration of bone marrow by malignant cells)  4- Can be MDS since patient is pancytopenic?  5- Can be vitamin deficiency (possibly B12) - can cause pancytopenia not only anemia (On a previous CBC back in january patient was found to have macrocytic anemia with MVC>100 and on the peripheral smear it said r/o b12 deficiency)  6- Can be other causes of anemia/pancytopenia such as hypothyroidism    Will need to transfuse patient for a Hg>7 ; but blood bank called saying she had positive antibodies and they have no blood currently available for her.  We need to send two pink tubes outside (to [place] blood bank) in order to check for compatible blood. Meanwhile will keep patient monitored, if any signs of instability, will have to transfuse group O- uncrossed blood. But for now patient is asymptomatic while at rest, no shortness of breath, no dizziness. She is not tachycardic nor hypotensive.  Will need to get GI and hematology/oncology on board for evaluation  f/u TSH, iron studies, vit b12, folate, SPEP, UPEP, , peripheral smear, retic count 1.67, 6%  - repeat CBC 22:53 Hb=5.2, getting 2 u prbc    # Cough and exertional shortness of breath  1- SOB can be because of the anemia  2- Patient might have non diagnosed COPD (chronic smoking 50 pack year, stopped only 4 months ago)  3- possible acute bronchitis / viral upper resp tract infection  Will continue with symptomatic treatment only. no indication of antibiotics. will give some nebulizers and assess for symptoms improvement.  Consider Pulmonary consult (can be done outpatient) for PFTs r/o copd    # History of buttock skin lesion s/p excision during hemorrhoidectomy surgery  Pathology showing malignancy of undetermined origin, clean margins of excision  Needs to follow up with oncology and address this issue too    # Breast cancer s/p lumpectomy and currently on anastrozole  continue with anastrozole for now unless oncology would stop it for now.  Supposidly patient was in remission, but on the current CT chest there was some axillary lymph nodes. Patient complaining of pain at the site.  R/O recurrence of disease, might require biopsy. evaluate by oncology, consider surgery consult if biopsy needed.  Might need new staging work-up  Note that patient is complaining of headaches, consider MRI brain r/o mets    # DVT proph (encourage ambulation, sequentials) no heparin, possible GI losses  GI prophylaxis  Regular diet  ambulate as tolerated  Full code  dispo home    Attending Attestation:    Pt seen and examined. Case and Plan discussed at rounds. Pt is here for Symptomatic anemia and significant h/x LGIB with multiple code fusion in the past. Problems: 1) Symptomatic Anemia - likely UGIG - Protonix, GI consult for possible EGD. Pt s/p CTA Abd/Pelvis no active bleed. 2) Ductal Breast CA s/p sx CT with + Nodes - rec Oncology f/u. 3) PAN Cytopenia - w/up sent and heme/onc consult. Will follow. Keep NPO past MN today for tomorrow EGD with GI. Monitor cbc q12h. # Working diagnosis: symptomatic anemia  Patient was found to be Pancytopenic.  Patient has history of breast cancer, currently on anastrozole, and has history of lower GI bleeding few months ago, and was found to have positive guaiac in ED.  -multufactorial  - r/o UGIB in light of NSAID use for headache  - s/p 2 u prbc today  - f.u repeat CBC at 8 PM  f/u TSH, iron studies, vit b12, folate, SPEP, UPEP, , peripheral smear, retic count 1.67, 6%  - repeat CBC 22:53 Hb=5.2, getting 2 u prbc    # Cough and exertional shortness of breath  1- SOB can be because of the anemia  2- Patient might have non diagnosed COPD (chronic smoking 50 pack year, stopped only 4 months ago)  3- possible acute bronchitis / viral upper resp tract infection  Will continue with symptomatic treatment only. no indication of antibiotics. will give some nebulizers and assess for symptoms improvement.  Consider Pulmonary consult (can be done outpatient) for PFTs r/o copd    # History of buttock skin lesion s/p excision during hemorrhoidectomy surgery  Pathology showing malignancy of undetermined origin, clean margins of excision  Needs to follow up with oncology and address this issue too    # Breast cancer s/p lumpectomy and currently on anastrozole  continue with anastrozole for now unless oncology would stop it for now.  Supposidly patient was in remission, but on the current CT chest there was some axillary lymph nodes. Patient complaining of pain at the site.  R/O recurrence of disease, might require biopsy. evaluate by oncology, consider surgery consult if biopsy needed.  Might need new staging work-up  Note that patient is complaining of headaches, consider MRI brain r/o mets    # DVT proph (encourage ambulation, sequentials) no heparin, possible GI losses  GI prophylaxis  Regular diet  ambulate as tolerated  Full code  dispo home    Attending Attestation:    Pt seen and examined. Case and Plan discussed at rounds. Pt is here for Symptomatic anemia and significant h/x LGIB with multiple code fusion in the past. Problems: 1) Symptomatic Anemia - likely UGIG - Protonix, GI consult for possible EGD. Pt s/p CTA Abd/Pelvis no active bleed. 2) Ductal Breast CA s/p sx CT with + Nodes - rec Oncology f/u. 3) PAN Cytopenia - w/up sent and heme/onc consult. Will follow. Keep NPO past MN today for tomorrow EGD with GI. Monitor cbc q12h.

## 2018-05-22 NOTE — PROGRESS NOTE ADULT - SUBJECTIVE AND OBJECTIVE BOX
Patient is a 67y old  Female who presents with a chief complaint of cough, dyspnea on exertion, generalized weakness (21 May 2018 16:39)      PAST MEDICAL & SURGICAL HISTORY:  Gastrointestinal bleeding, lower: In january 2018  Breast CA  History of total left hip replacement  Status post right breast lumpectomy        FAMILY HISTORY:  No pertinent family history in first degree relatives        Allergies    No Known Allergies    Intolerances        acetaminophen   Tablet. 650 milliGRAM(s) Oral every 6 hours PRN  ALBUTerol    0.083% 2.5 milliGRAM(s) Nebulizer every 6 hours  anastrozole 1 milliGRAM(s) Oral daily  buDESOnide 160 MICROgram(s)/formoterol 4.5 MICROgram(s) Inhaler 2 Puff(s) Inhalation two times a day  melatonin 5 milliGRAM(s) Oral at bedtime  nicotine -  14 mG/24Hr(s) Patch 1 patch Transdermal daily  oxyCODONE    5 mG/acetaminophen 325 mG 1 Tablet(s) Oral every 6 hours PRN  pantoprazole    Tablet 40 milliGRAM(s) Oral before breakfast  : Home Meds:      PHYSICAL EXAM    ICU Vital Signs Last 24 Hrs  T(C): 36.6 (22 May 2018 14:20), Max: 36.6 (22 May 2018 14:20)  T(F): 97.8 (22 May 2018 14:20), Max: 97.8 (22 May 2018 14:20)  HR: 77 (22 May 2018 14:20) (72 - 87)  BP: 103/59 (22 May 2018 14:20) (103/59 - 125/62)  BP(mean): --  ABP: --  ABP(mean): --  RR: 18 (22 May 2018 14:20) (17 - 18)  SpO2: 99% (22 May 2018 10:05) (99% - 100%)      General: NAD  HEENT: wnl  Lymph nodes: wnl  Lungs: decreased air entry on left , mild inspiratory rales  Cardiovascular: rs1s2 no murmur  Abdomen: soft non tender not distended  Extremities: +PP no LLE  Skin: wnl  Neurological: AAO3 no focal deficit      05-22-18 @ 07:01  -  05-22-18 @ 14:35  --------------------------------------------------------  IN:    Packed Red Blood Cells: 628 mL  Total IN: 628 mL    OUT:  Total OUT: 0 mL    Total NET: 628 mL          LABS:                          5.2    3.66  )-----------( 128      ( 21 May 2018 22:53 )             16.1                                               05-21    139  |  99  |  10  ----------------------------<  99  4.4   |  27  |  0.7    Ca    9.8      21 May 2018 11:09        PT/INR - ( 21 May 2018 22:53 )   PT: 12.90 sec;   INR: 1.19 ratio         PTT - ( 21 May 2018 22:53 )  PTT:22.6 sec                                           CARDIAC MARKERS ( 21 May 2018 11:09 )  x     / <0.01 ng/mL / 42 U/L / x     / <0.1 ng/mL                                                                                                                                                                          X-Rays:                                                                                         ECHO:    MEDICATIONS  (STANDING):  ALBUTerol    0.083% 2.5 milliGRAM(s) Nebulizer every 6 hours  anastrozole 1 milliGRAM(s) Oral daily  buDESOnide 160 MICROgram(s)/formoterol 4.5 MICROgram(s) Inhaler 2 Puff(s) Inhalation two times a day  melatonin 5 milliGRAM(s) Oral at bedtime  nicotine -  14 mG/24Hr(s) Patch 1 patch Transdermal daily  pantoprazole    Tablet 40 milliGRAM(s) Oral before breakfast    MEDICATIONS  (PRN):  acetaminophen   Tablet. 650 milliGRAM(s) Oral every 6 hours PRN Mild Pain (1 - 3)  oxyCODONE    5 mG/acetaminophen 325 mG 1 Tablet(s) Oral every 6 hours PRN Moderate Pain (4 - 6) Patient is a 67y old  Female who presents with a chief complaint of cough, dyspnea on exertion, generalized weakness (21 May 2018 16:39)    PAST MEDICAL & SURGICAL HISTORY:  Gastrointestinal bleeding, lower: In january 2018  Breast CA  History of total left hip replacement  Status post right breast lumpectomy    FAMILY HISTORY:  No pertinent family history in first degree relatives    Allergies    No Known Allergies    Intolerances    acetaminophen   Tablet. 650 milliGRAM(s) Oral every 6 hours PRN  ALBUTerol    0.083% 2.5 milliGRAM(s) Nebulizer every 6 hours  anastrozole 1 milliGRAM(s) Oral daily  buDESOnide 160 MICROgram(s)/formoterol 4.5 MICROgram(s) Inhaler 2 Puff(s) Inhalation two times a day  melatonin 5 milliGRAM(s) Oral at bedtime  nicotine -  14 mG/24Hr(s) Patch 1 patch Transdermal daily  oxyCODONE    5 mG/acetaminophen 325 mG 1 Tablet(s) Oral every 6 hours PRN  pantoprazole    Tablet 40 milliGRAM(s) Oral before breakfast  : Home Meds:    PHYSICAL EXAM  ICU Vital Signs Last 24 Hrs  T(C): 36.6 (22 May 2018 14:20), Max: 36.6 (22 May 2018 14:20)  T(F): 97.8 (22 May 2018 14:20), Max: 97.8 (22 May 2018 14:20)  HR: 77 (22 May 2018 14:20) (72 - 87)  BP: 103/59 (22 May 2018 14:20) (103/59 - 125/62)  RR: 18 (22 May 2018 14:20) (17 - 18)  SpO2: 99% (22 May 2018 10:05) (99% - 100%)    General: NAD  HEENT: wnl  Lymph nodes: wnl  Lungs: decreased air entry on left , mild inspiratory rales  Cardiovascular: rs1s2 no murmur  Abdomen: soft non tender not distended  Extremities: +PP no LLE  Skin: wnl  Neurological: AAO3 no focal deficit    05-22-18 @ 07:01  -  05-22-18 @ 14:35  --------------------------------------------------------  IN:    Packed Red Blood Cells: 628 mL  Total IN: 628 mL    OUT:  Total OUT: 0 mL    Total NET: 628 mL    LABS:                     5.2    3.66  )-----------( 128      ( 21 May 2018 22:53 )             16.1                                               05-21    139  |  99  |  10  ----------------------------<  99  4.4   |  27  |  0.7    Ca    9.8      21 May 2018 11:09    PT/INR - ( 21 May 2018 22:53 )   PT: 12.90 sec;   INR: 1.19 ratio      PTT - ( 21 May 2018 22:53 )  PTT:22.6 sec                                           CARDIAC MARKERS ( 21 May 2018 11:09 )  x     / <0.01 ng/mL / 42 U/L / x     / <0.1 ng/mL    MEDICATIONS  (STANDING):  ALBUTerol    0.083% 2.5 milliGRAM(s) Nebulizer every 6 hours  anastrozole 1 milliGRAM(s) Oral daily  buDESOnide 160 MICROgram(s)/formoterol 4.5 MICROgram(s) Inhaler 2 Puff(s) Inhalation two times a day  melatonin 5 milliGRAM(s) Oral at bedtime  nicotine -  14 mG/24Hr(s) Patch 1 patch Transdermal daily  pantoprazole    Tablet 40 milliGRAM(s) Oral before breakfast    MEDICATIONS  (PRN):  acetaminophen   Tablet. 650 milliGRAM(s) Oral every 6 hours PRN Mild Pain (1 - 3)  oxyCODONE    5 mG/acetaminophen 325 mG 1 Tablet(s) Oral every 6 hours PRN Moderate Pain (4 - 6)

## 2018-05-22 NOTE — CONSULT NOTE ADULT - ASSESSMENT
66 yo female patient with PMHx of right breast cancer (Well to moderately differentiated invasive ductal carcinoma ER+, [place]+, Her-2 neg, stage T1c, N0, M0) s/p lumpectomy around 1.5 years ago, followed by radiation therapy and currently maintained on anastrazole, history of lower GI bleeding back in January 2018 which was attributed to hemorrhoids, s/p hemorrhoidectomy and fissurotomy, currently presented because of worsening generalized weakness, dyspnea on exertion and cough.    1) Anemia with elevated reticulocyte percentage concomitant with active bleeding, in addition received 2 units PRBC without improvement in Hb. Follow up with GI. Anemia in last admission was also secondary to GI losses therefore old Macrocytosis could be secondary to reticulocytosis. Platelet count and WBC count at that time was normal. Secondary causes of macrocytosis were evaluated for such as B12, Folic acid, TSH, follow up on results.    2) Soft tissue tumor on biopsy of buttock lesions, will need treatment and staging once GI issue resolved.    3) Breast cancer s/p lumpectomy and radiation now on anastrozole, subcentimeter lymph nodes on CT Chest could be secondary to infectious etiology. Left sided axillary lymph node may require further investigation if does not resolve on follow up staging process.    Will discuss with attending. 66 yo female patient with PMHx of right breast cancer (Well to moderately differentiated invasive ductal carcinoma ER+, [place]+, Her-2 neg, stage T1c, N0, M0) s/p lumpectomy around 1.5 years ago, followed by radiation therapy and currently maintained on anastrazole, history of lower GI bleeding back in January 2018 which was attributed to hemorrhoids, s/p hemorrhoidectomy and fissurotomy, currently presented because of worsening generalized weakness, dyspnea on exertion and cough.    1) Anemia with elevated reticulocyte percentage suggestive of active bleeding with appropriate marrow response. Follow up with GI had recent Ibuprofen use. Anemia in last admission was also secondary to GI losses therefore old Macrocytosis could be secondary to reticulocytosis. Platelet count and WBC count at that time was normal. Secondary causes of macrocytosis were evaluated for such as B12, Folic acid, TSH, follow up on results. Check peripheral smear and do hemolytic workup with LDH, haptoglobin, and indirect bilirubin.     2) Soft tissue tumor on biopsy of buttock lesions, will need outpatient follow up and staging once GI issue resolved.    3) Breast cancer s/p lumpectomy and radiation now on anastrozole, subcentimeter lymph nodes on CT Chest could be secondary to infectious etiology. Left sided axillary lymph node may require further investigation if does not resolve on follow up staging process.    Will discuss with attending.

## 2018-05-22 NOTE — CONSULT NOTE ADULT - SUBJECTIVE AND OBJECTIVE BOX
Patient is a 67y old  Female who presents with shortness of breath on exertion     HPI:  66 yo female patient with PMHx of right breast cancer (Well to moderately differentiated invasive ductal carcinoma ER+, [place]+, Her-2 neg, stage T1c, N0, M0) s/p lumpectomy around 1.5 years ago, followed by radiation therapy and currently maintained on anastrazole, history of lower GI bleeding back in January 2018 which was attributed to hemorrhoids s/p hemorrhoidectomy and fissurotomy, currently presented because of worsening generalized weakness and dyspnea on exertion.     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 Patient was d/c uneventfully then presented again after few weeks for BRBPR and significant drop in hb and Colonoscopy 1/2018 for BRBPR by Dr sanchez: poor prep, internal hemorrhoids, skin lesion noted at the buttock, diverticulosis. No blood was seen in colon or TI in all colonoscopies so bleeding was attributed to hemorrhoids s/p hemorrhoidectomy on 1/11/18 by Dr Ocampo with excision of 2 perianal lesions and fissurotomy. Around 2 weeks after, the patient presented again with significant drop in hb and BRBPR and Dr ocampo perfomed a reinforcement of hemorrhoidectomy suture line and rigid sigmoidoscopy.      Colonoscopy 1/2018 for BRBPR by Dr sanchez: poor prep, internal hemorrhoids, skin lesion noted at the buttock, diverticulosis.  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 In ED, patient was found to have severe anemia (Hg 5.8) and positive guaiac, admitted for evaluation. Baseline hb 12-13 in 2017.     FH non significant   usually has 1 normal bm per day, no weight loss         ROS wnl      PAST MEDICAL & SURGICAL HISTORY:  Gastrointestinal bleeding, lower: In january 2018  Breast CA  History of total left hip replacement  Status post right breast lumpectomy      Home Medications:  anastrozole 1 mg oral tablet: 1 tab(s) orally once a day (21 May 2018 16:50)      MEDICATIONS  (STANDING):  ALBUTerol    0.083% 2.5 milliGRAM(s) Nebulizer every 6 hours  anastrozole 1 milliGRAM(s) Oral daily  buDESOnide 160 MICROgram(s)/formoterol 4.5 MICROgram(s) Inhaler 2 Puff(s) Inhalation two times a day  melatonin 5 milliGRAM(s) Oral at bedtime  nicotine -  14 mG/24Hr(s) Patch 1 patch Transdermal daily  pantoprazole    Tablet 40 milliGRAM(s) Oral before breakfast    MEDICATIONS  (PRN):  acetaminophen   Tablet. 650 milliGRAM(s) Oral every 6 hours PRN Mild Pain (1 - 3)  oxyCODONE    5 mG/acetaminophen 325 mG 1 Tablet(s) Oral every 6 hours PRN Moderate Pain (4 - 6)      Allergies    No Known Allergies    Intolerances        FAMILY HISTORY:  No pertinent family history in first degree relatives      SOCIAL    REVIEW OF SYSTEMS    Vital Signs Last 24 Hrs  T(C): 36.6 (22 May 2018 14:20), Max: 36.6 (22 May 2018 14:20)  T(F): 97.8 (22 May 2018 14:20), Max: 97.8 (22 May 2018 14:20)  HR: 77 (22 May 2018 14:20) (72 - 87)  BP: 103/59 (22 May 2018 14:20) (103/59 - 125/62)  BP(mean): --  RR: 18 (22 May 2018 14:20) (18 - 18)  SpO2: 99% (22 May 2018 10:05) (99% - 99%)    GENERAL:  no distress  SKIN: intact   HEENT:  NC/AT,  anicteric  CHEST:   no increased effort, breath sounds clear  HEART:  Regular rhythm  ABDOMEN:  Soft, non-tender, non-distended, normoactive bowel sounds,  no masses ,no hepato-splenomegaly, no signs of chronic liver disease  EXTEREMITIES:  no cyanosis        CBC Full  -  ( 21 May 2018 22:53 )  WBC Count : 3.66 K/uL  Hemoglobin : 5.2 g/dL  Hematocrit : 16.1 %  Platelet Count - Automated : 128 K/uL  Mean Cell Volume : 92.5 fL  Mean Cell Hemoglobin : 29.9 pg  Mean Cell Hemoglobin Concentration : 32.3 g/dL  Auto Neutrophil # : 1.47 K/uL  Auto Lymphocyte # : 1.32 K/uL  Auto Monocyte # : 0.10 K/uL  Auto Eosinophil # : 0.10 K/uL  Auto Basophil # : 0.02 K/uL  Auto Neutrophil % : 40.2 %  Auto Lymphocyte % : 36.1 %  Auto Monocyte % : 2.7 %  Auto Eosinophil % : 2.7 %  Auto Basophil % : 0.5 %      Hemoglobin: 5.2 g/dL (05-21-18 @ 22:53)  INR: 1.19 ratio (05-21-18 @ 22:53)  Hemoglobin: 5.8 g/dL (05-21-18 @ 11:09)      PT/INR - ( 21 May 2018 22:53 )   PT: 12.90 sec;   INR: 1.19 ratio         PTT - ( 21 May 2018 22:53 )  PTT:22.6 sec    05-21    139  |  99  |  10  ----------------------------<  99  4.4   |  27  |  0.7    Ca    9.8      21 May 2018 11:09    TPro  7.7  /  Alb  x   /  TBili  x   /  DBili  x   /  AST  x   /  ALT  x   /  AlkPhos  x   05-21              RADIOLOGY Patient is a 67y old  Female who presents with shortness of breath on exertion     HPI:  66 yo female patient with PMHx of right breast cancer (Well to moderately differentiated invasive ductal carcinoma ER+, [place]+, Her-2 neg, stage T1c, N0, M0) s/p lumpectomy around 1.5 years ago, followed by radiation therapy and currently maintained on anastrazole, history of lower GI bleeding back in January 2018 which was attributed to hemorrhoids s/p hemorrhoidectomy and fissurotomy, currently presented because of worsening generalized weakness and dyspnea on exertion.     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 Patient was d/c uneventfully then presented again after few weeks for BRBPR and significant drop in hb and Colonoscopy 1/2018 for BRBPR by Dr sanchez: poor prep, internal hemorrhoids, skin lesion noted at the buttock, diverticulosis. No blood was seen in colon or TI in all colonoscopies so bleeding was attributed to hemorrhoids s/p hemorrhoidectomy on 1/11/18 by Dr Ocampo with excision of 2 perianal lesions and fissurotomy. Around 2 weeks after, the patient presented again with significant drop in hb and BRBPR and Dr ocampo perfomed a reinforcement of hemorrhoidectomy suture line and rigid sigmoidoscopy.      Colonoscopy 1/2018 for BRBPR by Dr sanchez: poor prep, internal hemorrhoids, skin lesion noted at the buttock, diverticulosis.  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 In ED, patient was found to have severe anemia (Hg 5.8) and positive guaiac, admitted for evaluation. Baseline hb 12-13 in 2017.     FH non significant   usually has 1 normal bm per day, no weight loss     ROS wnl      PAST MEDICAL & SURGICAL HISTORY:  Gastrointestinal bleeding, lower: In january 2018  Breast CA  History of total left hip replacement  Status post right breast lumpectomy      Home Medications:  anastrozole 1 mg oral tablet: 1 tab(s) orally once a day (21 May 2018 16:50)      MEDICATIONS  (STANDING):  ALBUTerol    0.083% 2.5 milliGRAM(s) Nebulizer every 6 hours  anastrozole 1 milliGRAM(s) Oral daily  buDESOnide 160 MICROgram(s)/formoterol 4.5 MICROgram(s) Inhaler 2 Puff(s) Inhalation two times a day  melatonin 5 milliGRAM(s) Oral at bedtime  nicotine -  14 mG/24Hr(s) Patch 1 patch Transdermal daily  pantoprazole    Tablet 40 milliGRAM(s) Oral before breakfast    MEDICATIONS  (PRN):  acetaminophen   Tablet. 650 milliGRAM(s) Oral every 6 hours PRN Mild Pain (1 - 3)  oxyCODONE    5 mG/acetaminophen 325 mG 1 Tablet(s) Oral every 6 hours PRN Moderate Pain (4 - 6)      Allergies    No Known Allergies    FAMILY HISTORY:  No pertinent family history in first degree relatives      Vital Signs Last 24 Hrs  T(C): 36.6 (22 May 2018 14:20), Max: 36.6 (22 May 2018 14:20)  T(F): 97.8 (22 May 2018 14:20), Max: 97.8 (22 May 2018 14:20)  HR: 77 (22 May 2018 14:20) (72 - 87)  BP: 103/59 (22 May 2018 14:20) (103/59 - 125/62)  BP(mean): --  RR: 18 (22 May 2018 14:20) (18 - 18)  SpO2: 99% (22 May 2018 10:05) (99% - 99%)    GENERAL:  no distress  SKIN: intact   HEENT:  NC/AT,  anicteric  CHEST:   no increased effort, breath sounds clear  HEART:  Regular rhythm  ABDOMEN:  Soft, non-tender, non-distended, normoactive bowel sounds,  no masses ,no hepato-splenomegaly, no signs of chronic liver disease  EXTREMITIES:  no cyanosis        CBC Full  -  ( 21 May 2018 22:53 )  WBC Count : 3.66 K/uL  Hemoglobin : 5.2 g/dL  Hematocrit : 16.1 %  Platelet Count - Automated : 128 K/uL  Mean Cell Volume : 92.5 fL  Mean Cell Hemoglobin : 29.9 pg  Mean Cell Hemoglobin Concentration : 32.3 g/dL  Auto Neutrophil # : 1.47 K/uL  Auto Lymphocyte # : 1.32 K/uL  Auto Monocyte # : 0.10 K/uL  Auto Eosinophil # : 0.10 K/uL  Auto Basophil # : 0.02 K/uL  Auto Neutrophil % : 40.2 %  Auto Lymphocyte % : 36.1 %  Auto Monocyte % : 2.7 %  Auto Eosinophil % : 2.7 %  Auto Basophil % : 0.5 %      Hemoglobin: 5.2 g/dL (05-21-18 @ 22:53)  INR: 1.19 ratio (05-21-18 @ 22:53)  Hemoglobin: 5.8 g/dL (05-21-18 @ 11:09)      PT/INR - ( 21 May 2018 22:53 )   PT: 12.90 sec;   INR: 1.19 ratio         PTT - ( 21 May 2018 22:53 )  PTT:22.6 sec    05-21    139  |  99  |  10  ----------------------------<  99  4.4   |  27  |  0.7    Ca    9.8      21 May 2018 11:09    TPro  7.7  /  Alb  x   /  TBili  x   /  DBili  x   /  AST  x   /  ALT  x   /  AlkPhos  x   05-21

## 2018-05-22 NOTE — CONSULT NOTE ADULT - SUBJECTIVE AND OBJECTIVE BOX
Patient is a 67y old  Female who presents with a chief complaint of cough, dyspnea on exertion, generalized weakness      HPI:  66 yo female patient with PMHx of right breast cancer (Well to moderately differentiated invasive ductal carcinoma ER+, NE+, Her-2 neg, stage T1c, N0, M0) s/p lumpectomy around 1.5 years ago, followed by radiation therapy and currently maintained on anastrazole, history of lower GI bleeding back in January 2018 which was attributed to hemorrhoids, s/p hemorrhoidectomy and fissurotomy, currently presented because of worsening generalized weakness, dyspnea on exertion and cough.  Patient in fact has multiple complaints:  1- She complains of few days of productive cough, clear phlegm, associated with left armpit pain, radiating down her left side, worse with coughing and with deep inspiration. Patient is a former smoker of around 50 pack year, stopped 4 months ago. Denies sore throat, rhinorrhea, fever...  2- Patient is also having generalized weakness and exertional shortness of breath, getting worse over the last month or so.  3- Patient is complaining of intermittent headaches recently    In ED, patient was found to have severe anemia (Hg 5.8) and positive guaiac, admitted for evaluation.   Of note, patient had a screening colonoscopy done in December 2017 which was complicated by bleeding and hemorrhoidectomy. Pathology report of hemorrhoidectomy shows    Oncologic History:    Patient had a screening bilateral mammogram on 10/27/16 that showed an irregularly-shaped spiculated hypoechoic mass in the right breast at the 3 o'clock axis in the periareolar region measuring 1.6 cm in greatest dimension and two additional masses in the right breast, one in the 1 o' clock axis and one in the 3 o' clock axis. Ultrasound-guided core biopsies were done. Biopsy of large spiculated mass showed well to moderately differentiated invasive ductal carcinoma with no intraductal component, ER positive, NE positive, and HER-2/britni negative. Biopsy of other two lesions revealed radial scars on both. There was no clinical abnormal lymphadenopathy. 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   The patient saw Dr. Oakley for surgical consultation. On 2/7/17, she underwent right breast needle-localized lumpectomy x 2, left breast needle-localized lumpectomy, and right axillary sentinel lymph node biopsy. The pathology showed 1.4 cm invasive moderately differentiated duct carcinoma with focal micropapillary features and associated calcifications and duct carcinoma in situ. No LVI or PNI was seen. The invasive tumor was ER positive 100%, NE positive 85%, Her2 negative (2+ by IHC and negative by TIMOTHY). Ki-67% was less than 10%. The surgical margins were negative. Two right axillary sentinel lymph nodes were negative for malignancy. The AJCC 7th edition pathologic stage is IA, [id number]. Lumpectomy of the second area of the right breast and left breast did not reveal evidence of malignancy.  Her Oncotype DX recurrence score is 6 corresponding to a 5% 10-year risk of distant recurrence with adjuvant hormonal therapy.  She saw Dr. Jackson and finished adjuvant breast radiotherapy.  Patient is currently on  Anastrozole therapy.     ROS:  Negative except for:    PAST MEDICAL & SURGICAL HISTORY:  Gastrointestinal bleeding, lower: In january 2018  Breast CA  History of total left hip replacement  Status post right breast lumpectomy      SOCIAL HISTORY:    FAMILY HISTORY:  No pertinent family history in first degree relatives      MEDICATIONS  (STANDING):  ALBUTerol    0.083% 2.5 milliGRAM(s) Nebulizer every 6 hours  anastrozole 1 milliGRAM(s) Oral daily  buDESOnide 160 MICROgram(s)/formoterol 4.5 MICROgram(s) Inhaler 2 Puff(s) Inhalation two times a day  melatonin 5 milliGRAM(s) Oral at bedtime  nicotine -  14 mG/24Hr(s) Patch 1 patch Transdermal daily  pantoprazole    Tablet 40 milliGRAM(s) Oral before breakfast    MEDICATIONS  (PRN):  acetaminophen   Tablet. 650 milliGRAM(s) Oral every 6 hours PRN Mild Pain (1 - 3)  oxyCODONE    5 mG/acetaminophen 325 mG 1 Tablet(s) Oral every 6 hours PRN Moderate Pain (4 - 6)    Height (cm): 170.18 (05-21-18 @ 21:31)  Weight (kg): 57 (05-21-18 @ 21:31)  BMI (kg/m2): 19.7 (05-21-18 @ 21:31)  BSA (m2): 1.66 (05-21-18 @ 21:31)  Allergies    No Known Allergies    Intolerances        Vital Signs Last 24 Hrs  T(C): 36 (22 May 2018 06:10), Max: 36.3 (21 May 2018 16:17)  T(F): 96.8 (22 May 2018 06:10), Max: 97.4 (21 May 2018 16:17)  HR: 72 (22 May 2018 06:10) (72 - 87)  BP: 112/71 (22 May 2018 06:10) (112/71 - 125/62)  BP(mean): --  RR: 18 (22 May 2018 06:10) (17 - 18)  SpO2: 100% (21 May 2018 16:17) (100% - 100%)    PHYSICAL EXAM  General: adult in NAD  HEENT: clear oropharynx, anicteric sclera, pink conjunctiva  Neck: supple  CV: normal S1/S2 with no murmur rubs or gallops  Lungs: positive air movement b/l ant lungs,clear to auscultation, no wheezes, no rales  Abdomen: soft non-tender non-distended, no hepatosplenomegaly  Ext: no clubbing cyanosis or edema  Skin: no rashes and no petechiae  Neuro: alert and oriented X 4, no focal deficits      LABS:                          5.2    3.66  )-----------( 128      ( 21 May 2018 22:53 )             16.1         Mean Cell Volume : 92.5 fL  Mean Cell Hemoglobin : 29.9 pg  Mean Cell Hemoglobin Concentration : 32.3 g/dL  Auto Neutrophil # : 1.47 K/uL  Auto Lymphocyte # : 1.32 K/uL  Auto Monocyte # : 0.10 K/uL  Auto Eosinophil # : 0.10 K/uL  Auto Basophil # : 0.02 K/uL  Auto Neutrophil % : 40.2 %  Auto Lymphocyte % : 36.1 %  Auto Monocyte % : 2.7 %  Auto Eosinophil % : 2.7 %  Auto Basophil % : 0.5 %      Serial CBC's  05-21 @ 22:53  Hct-16.1 / Hgb-5.2 / Plat-128 / RBC-1.74 / WBC-3.66  Serial CBC's  05-21 @ 11:09  Hct-18.7 / Hgb-5.8 / Plat-117 / RBC-2.07 / WBC-3.98      05-21    139  |  99  |  10  ----------------------------<  99  4.4   |  27  |  0.7    Ca    9.8      21 May 2018 11:09        PT/INR - ( 21 May 2018 22:53 )   PT: 12.90 sec;   INR: 1.19 ratio         PTT - ( 21 May 2018 22:53 )  PTT:22.6 sec    Reticulocyte Percent: 6.0 % (05-21 @ 22:53)              BLOOD SMEAR INTERPRETATION:       RADIOLOGY & ADDITIONAL STUDIES: Patient is a 67y old  Female who presents with a chief complaint of cough, dyspnea on exertion, generalized weakness      HPI:  68 yo female patient with PMHx of right breast cancer (Well to moderately differentiated invasive ductal carcinoma ER+, PA+, Her-2 neg, stage T1c, N0, M0) s/p lumpectomy around 1.5 years ago, followed by radiation therapy and currently maintained on anastrazole, history of lower GI bleeding back in January 2018 which was attributed to hemorrhoids, s/p hemorrhoidectomy and fissurotomy, currently presented because of worsening generalized weakness, dyspnea on exertion and cough.  Patient in fact has multiple complaints:  1- She complains of few days of productive cough, clear phlegm, associated with left armpit pain, radiating down her left side, worse with coughing and with deep inspiration. Patient is a former smoker of around 50 pack year, stopped 4 months ago. Denies sore throat, rhinorrhea, fever...  2- Patient is also having generalized weakness and exertional shortness of breath, getting worse over the last month or so.  3- Patient is complaining of intermittent headaches recently    In ED, patient was found to have severe anemia (Hg 5.8) and positive guaiac, admitted for evaluation.   Of note, patient had a screening colonoscopy done in December 2017 which was complicated by bleeding and hemorrhoidectomy. 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     Oncologic History:    Patient had a screening bilateral mammogram on 10/27/16 that showed an irregularly-shaped spiculated hypoechoic mass in the right breast at the 3 o'clock axis in the periareolar region measuring 1.6 cm in greatest dimension and two additional masses in the right breast, one in the 1 o' clock axis and one in the 3 o' clock axis. Ultrasound-guided core biopsies were done. Biopsy of large spiculated mass showed well to moderately differentiated invasive ductal carcinoma with no intraductal component, ER positive, PA positive, and HER-2/britni negative. Biopsy of other two lesions revealed radial scars on both. There was no clinical abnormal lymphadenopathy. 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   The patient saw Dr. Oakley for surgical consultation. On 2/7/17, she underwent right breast needle-localized lumpectomy x 2, left breast needle-localized lumpectomy, and right axillary sentinel lymph node biopsy. The pathology showed 1.4 cm invasive moderately differentiated duct carcinoma with focal micropapillary features and associated calcifications and duct carcinoma in situ. No LVI or PNI was seen. The invasive tumor was ER positive 100%, PA positive 85%, Her2 negative (2+ by IHC and negative by TIMOTHY). Ki-67% was less than 10%. The surgical margins were negative. Two right axillary sentinel lymph nodes were negative for malignancy. The AJCC 7th edition pathologic stage is IA, [id number]. Lumpectomy of the second area of the right breast and left breast did not reveal evidence of malignancy.  Her Oncotype DX recurrence score is 6 corresponding to a 5% 10-year risk of distant recurrence with adjuvant hormonal therapy.  She saw Dr. Jackson and finished adjuvant breast radiotherapy.  Patient is currently on  Anastrozole therapy.     ROS:  Negative except for:    PAST MEDICAL & SURGICAL HISTORY:  Gastrointestinal bleeding, lower: In january 2018  Breast CA  History of total left hip replacement  Status post right breast lumpectomy      SOCIAL HISTORY:    FAMILY HISTORY:  No pertinent family history in first degree relatives      MEDICATIONS  (STANDING):  ALBUTerol    0.083% 2.5 milliGRAM(s) Nebulizer every 6 hours  anastrozole 1 milliGRAM(s) Oral daily  buDESOnide 160 MICROgram(s)/formoterol 4.5 MICROgram(s) Inhaler 2 Puff(s) Inhalation two times a day  melatonin 5 milliGRAM(s) Oral at bedtime  nicotine -  14 mG/24Hr(s) Patch 1 patch Transdermal daily  pantoprazole    Tablet 40 milliGRAM(s) Oral before breakfast    MEDICATIONS  (PRN):  acetaminophen   Tablet. 650 milliGRAM(s) Oral every 6 hours PRN Mild Pain (1 - 3)  oxyCODONE    5 mG/acetaminophen 325 mG 1 Tablet(s) Oral every 6 hours PRN Moderate Pain (4 - 6)    Height (cm): 170.18 (05-21-18 @ 21:31)  Weight (kg): 57 (05-21-18 @ 21:31)  BMI (kg/m2): 19.7 (05-21-18 @ 21:31)  BSA (m2): 1.66 (05-21-18 @ 21:31)  Allergies    No Known Allergies    Intolerances        Vital Signs Last 24 Hrs  T(C): 36 (22 May 2018 06:10), Max: 36.3 (21 May 2018 16:17)  T(F): 96.8 (22 May 2018 06:10), Max: 97.4 (21 May 2018 16:17)  HR: 72 (22 May 2018 06:10) (72 - 87)  BP: 112/71 (22 May 2018 06:10) (112/71 - 125/62)  BP(mean): --  RR: 18 (22 May 2018 06:10) (17 - 18)  SpO2: 100% (21 May 2018 16:17) (100% - 100%)    PHYSICAL EXAM  General: adult in NAD  HEENT: clear oropharynx, anicteric sclera, pink conjunctiva  Neck: supple  CV: normal S1/S2 with no murmur rubs or gallops  Lungs: positive air movement b/l ant lungs,clear to auscultation, no wheezes, no rales  Abdomen: soft non-tender non-distended, no hepatosplenomegaly  Ext: no clubbing cyanosis or edema  Skin: no rashes and no petechiae  Neuro: alert and oriented X 4, no focal deficits      LABS:                          5.2    3.66  )-----------( 128      ( 21 May 2018 22:53 )             16.1         Mean Cell Volume : 92.5 fL  Mean Cell Hemoglobin : 29.9 pg  Mean Cell Hemoglobin Concentration : 32.3 g/dL  Auto Neutrophil # : 1.47 K/uL  Auto Lymphocyte # : 1.32 K/uL  Auto Monocyte # : 0.10 K/uL  Auto Eosinophil # : 0.10 K/uL  Auto Basophil # : 0.02 K/uL  Auto Neutrophil % : 40.2 %  Auto Lymphocyte % : 36.1 %  Auto Monocyte % : 2.7 %  Auto Eosinophil % : 2.7 %  Auto Basophil % : 0.5 %      Serial CBC's  05-21 @ 22:53  Hct-16.1 / Hgb-5.2 / Plat-128 / RBC-1.74 / WBC-3.66  Serial CBC's  05-21 @ 11:09  Hct-18.7 / Hgb-5.8 / Plat-117 / RBC-2.07 / WBC-3.98      05-21    139  |  99  |  10  ----------------------------<  99  4.4   |  27  |  0.7    Ca    9.8      21 May 2018 11:09        PT/INR - ( 21 May 2018 22:53 )   PT: 12.90 sec;   INR: 1.19 ratio         PTT - ( 21 May 2018 22:53 )  PTT:22.6 sec    Reticulocyte Percent: 6.0 % (05-21 @ 22:53)    BLOOD SMEAR INTERPRETATION:       RADIOLOGY & ADDITIONAL STUDIES:  < from: CT Angio Chest w/ IV Cont (05.21.18 @ 13:56) >  1.  No pulmonary embolus.   2.  Multiple bilateral subcentimeter axillary lymph nodes and prominent   left axillary lymph node measuring up to 1.2 cm in short axis,   nonspecific. Postsurgical clips along the right axilla and breast.    < end of copied text >    < from: Mammogram Diagnostic Digital, Right (04.30.18 @ 10:39) >   No mammographic evidence of malignancy.    < end of copied text > Patient is a 67y old  Female who presents with a chief complaint of cough, dyspnea on exertion, generalized weakness      HPI:  66 yo female patient with PMHx of right breast cancer (Well to moderately differentiated invasive ductal carcinoma ER+, LA+, Her-2 neg, stage T1c, N0, M0) s/p lumpectomy around 1.5 years ago, followed by radiation therapy and currently maintained on anastrazole, history of lower GI bleeding back in January 2018 which was attributed to hemorrhoids, s/p hemorrhoidectomy and fissurotomy, currently presented because of worsening generalized weakness, dyspnea on exertion and cough.  Patient in fact has multiple complaints:  1- She complains of few days of productive cough, clear phlegm, associated with left armpit pain, radiating down her left side, worse with coughing and with deep inspiration. Patient is a former smoker of around 50 pack year, stopped 4 months ago. Denies sore throat, rhinorrhea, fever...  2- Patient is also having generalized weakness and exertional shortness of breath, getting worse over the last month or so.  3- Patient is complaining of intermittent headaches recently    In ED, patient was found to have severe anemia (Hg 5.8) and positive guaiac, admitted for evaluation.   Of note, patient had a screening colonoscopy done in December 2017 which was complicated by bleeding and hemorrhoidectomy. 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 She also notes a 25 lb weight loss in the past 2 months without any decrease in appetite.    Oncologic History:    Patient had a screening bilateral mammogram on 10/27/16 that showed an irregularly-shaped spiculated hypoechoic mass in the right breast at the 3 o'clock axis in the periareolar region measuring 1.6 cm in greatest dimension and two additional masses in the right breast, one in the 1 o' clock axis and one in the 3 o' clock axis. Ultrasound-guided core biopsies were done. Biopsy of large spiculated mass showed well to moderately differentiated invasive ductal carcinoma with no intraductal component, ER positive, LA positive, and HER-2/britni negative. Biopsy of other two lesions revealed radial scars on both. There was no clinical abnormal lymphadenopathy. 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   The patient saw Dr. Oakley for surgical consultation. On 2/7/17, she underwent right breast needle-localized lumpectomy x 2, left breast needle-localized lumpectomy, and right axillary sentinel lymph node biopsy. The pathology showed 1.4 cm invasive moderately differentiated duct carcinoma with focal micropapillary features and associated calcifications and duct carcinoma in situ. No LVI or PNI was seen. The invasive tumor was ER positive 100%, LA positive 85%, Her2 negative (2+ by IHC and negative by TIMOTHY). Ki-67% was less than 10%. The surgical margins were negative. Two right axillary sentinel lymph nodes were negative for malignancy. The AJCC 7th edition pathologic stage is IA, [id number]. Lumpectomy of the second area of the right breast and left breast did not reveal evidence of malignancy.  Her Oncotype DX recurrence score is 6 corresponding to a 5% 10-year risk of distant recurrence with adjuvant hormonal therapy.  She saw Dr. Jackson and finished adjuvant breast radiotherapy.  Patient is currently on  Anastrozole therapy.     ROS:  Negative except for:    PAST MEDICAL & SURGICAL HISTORY:  Gastrointestinal bleeding, lower: In january 2018  Breast CA  History of total left hip replacement  Status post right breast lumpectomy      SOCIAL HISTORY:    FAMILY HISTORY:  No pertinent family history in first degree relatives      MEDICATIONS  (STANDING):  ALBUTerol    0.083% 2.5 milliGRAM(s) Nebulizer every 6 hours  anastrozole 1 milliGRAM(s) Oral daily  buDESOnide 160 MICROgram(s)/formoterol 4.5 MICROgram(s) Inhaler 2 Puff(s) Inhalation two times a day  melatonin 5 milliGRAM(s) Oral at bedtime  nicotine -  14 mG/24Hr(s) Patch 1 patch Transdermal daily  pantoprazole    Tablet 40 milliGRAM(s) Oral before breakfast    MEDICATIONS  (PRN):  acetaminophen   Tablet. 650 milliGRAM(s) Oral every 6 hours PRN Mild Pain (1 - 3)  oxyCODONE    5 mG/acetaminophen 325 mG 1 Tablet(s) Oral every 6 hours PRN Moderate Pain (4 - 6)    Height (cm): 170.18 (05-21-18 @ 21:31)  Weight (kg): 57 (05-21-18 @ 21:31)  BMI (kg/m2): 19.7 (05-21-18 @ 21:31)  BSA (m2): 1.66 (05-21-18 @ 21:31)  Allergies    No Known Allergies    Intolerances        Vital Signs Last 24 Hrs  T(C): 36 (22 May 2018 06:10), Max: 36.3 (21 May 2018 16:17)  T(F): 96.8 (22 May 2018 06:10), Max: 97.4 (21 May 2018 16:17)  HR: 72 (22 May 2018 06:10) (72 - 87)  BP: 112/71 (22 May 2018 06:10) (112/71 - 125/62)  BP(mean): --  RR: 18 (22 May 2018 06:10) (17 - 18)  SpO2: 100% (21 May 2018 16:17) (100% - 100%)    PHYSICAL EXAM  General: adult in NAD  Neck: supple  CV: normal S1/S2 with no murmur rubs or gallops  Lungs: positive air movement b/l ant lungs,clear to auscultation, no wheezes, no rales  Abdomen: soft non-tender non-distended, no hepatosplenomegaly  Ext: no clubbing cyanosis or edema  Skin: no rashes and no petechiae  Neuro: alert and oriented X 4, no focal deficits      LABS:                      5.2    3.66  )-----------( 128      ( 21 May 2018 22:53 )             16.1         Mean Cell Volume : 92.5 fL  Mean Cell Hemoglobin : 29.9 pg  Mean Cell Hemoglobin Concentration : 32.3 g/dL  Auto Neutrophil # : 1.47 K/uL  Auto Lymphocyte # : 1.32 K/uL  Auto Monocyte # : 0.10 K/uL  Auto Eosinophil # : 0.10 K/uL  Auto Basophil # : 0.02 K/uL  Auto Neutrophil % : 40.2 %  Auto Lymphocyte % : 36.1 %  Auto Monocyte % : 2.7 %  Auto Eosinophil % : 2.7 %  Auto Basophil % : 0.5 %      Serial CBC's  05-21 @ 22:53  Hct-16.1 / Hgb-5.2 / Plat-128 / RBC-1.74 / WBC-3.66  Serial CBC's  05-21 @ 11:09  Hct-18.7 / Hgb-5.8 / Plat-117 / RBC-2.07 / WBC-3.98      05-21    139  |  99  |  10  ----------------------------<  99  4.4   |  27  |  0.7    Ca    9.8      21 May 2018 11:09        PT/INR - ( 21 May 2018 22:53 )   PT: 12.90 sec;   INR: 1.19 ratio         PTT - ( 21 May 2018 22:53 )  PTT:22.6 sec    Reticulocyte Percent: 6.0 % (05-21 @ 22:53)    BLOOD SMEAR INTERPRETATION:       RADIOLOGY & ADDITIONAL STUDIES:  < from: CT Angio Chest w/ IV Cont (05.21.18 @ 13:56) >  1.  No pulmonary embolus.   2.  Multiple bilateral subcentimeter axillary lymph nodes and prominent   left axillary lymph node measuring up to 1.2 cm in short axis,   nonspecific. Postsurgical clips along the right axilla and breast.    < end of copied text >    < from: Mammogram Diagnostic Digital, Right (04.30.18 @ 10:39) >   No mammographic evidence of malignancy.    < end of copied text >

## 2018-05-23 ENCOUNTER — RESULT REVIEW (OUTPATIENT)
Age: 68
End: 2018-05-23

## 2018-05-23 LAB
% ALBUMIN: 44.1 % — SIGNIFICANT CHANGE UP
% ALPHA 1: 7.8 % — SIGNIFICANT CHANGE UP
% ALPHA 2: 9.6 % — SIGNIFICANT CHANGE UP
% BETA: 20.3 % — SIGNIFICANT CHANGE UP
% GAMMA: 18.2 % — SIGNIFICANT CHANGE UP
% M SPIKE: SIGNIFICANT CHANGE UP %
ALBUMIN SERPL ELPH-MCNC: 3.4 G/DL — LOW (ref 3.6–5.5)
ALBUMIN/GLOB SERPL ELPH: 0.8 RATIO — SIGNIFICANT CHANGE UP
ALLERGY+IMMUNOLOGY DIAG STUDY NOTE: SIGNIFICANT CHANGE UP
ALLERGY+IMMUNOLOGY DIAG STUDY NOTE: SIGNIFICANT CHANGE UP
ALPHA1 GLOB SERPL ELPH-MCNC: 0.6 G/DL — HIGH (ref 0.1–0.4)
ALPHA2 GLOB SERPL ELPH-MCNC: 0.7 G/DL — SIGNIFICANT CHANGE UP (ref 0.5–1)
ANION GAP SERPL CALC-SCNC: 12 MMOL/L — SIGNIFICANT CHANGE UP (ref 7–14)
B-GLOBULIN SERPL ELPH-MCNC: 1.6 G/DL — HIGH (ref 0.5–1)
BASOPHILS # BLD AUTO: 0.02 K/UL — SIGNIFICANT CHANGE UP (ref 0–0.2)
BASOPHILS NFR BLD AUTO: 0.6 % — SIGNIFICANT CHANGE UP (ref 0–1)
BUN SERPL-MCNC: 9 MG/DL — LOW (ref 10–20)
CALCIUM SERPL-MCNC: 8.8 MG/DL — SIGNIFICANT CHANGE UP (ref 8.5–10.1)
CHLORIDE SERPL-SCNC: 101 MMOL/L — SIGNIFICANT CHANGE UP (ref 98–110)
CO2 SERPL-SCNC: 26 MMOL/L — SIGNIFICANT CHANGE UP (ref 17–32)
CREAT SERPL-MCNC: 0.6 MG/DL — LOW (ref 0.7–1.5)
DIR ANTIGLOB POLYSPECIFIC INTERPRETATION: (no result)
EOSINOPHIL # BLD AUTO: 0.07 K/UL — SIGNIFICANT CHANGE UP (ref 0–0.7)
EOSINOPHIL NFR BLD AUTO: 2.2 % — SIGNIFICANT CHANGE UP (ref 0–8)
GAMMA GLOBULIN: 1.4 G/DL — SIGNIFICANT CHANGE UP (ref 0.6–1.6)
GLUCOSE SERPL-MCNC: 107 MG/DL — HIGH (ref 70–99)
HAPTOGLOB SERPL-MCNC: 158 MG/DL — SIGNIFICANT CHANGE UP (ref 34–200)
HCT VFR BLD CALC: 25 % — LOW (ref 37–47)
HGB BLD-MCNC: 8.2 G/DL — LOW (ref 12–16)
IMM GRANULOCYTES NFR BLD AUTO: 15 % — HIGH (ref 0.1–0.3)
INTERPRETATION SERPL IFE-IMP: SIGNIFICANT CHANGE UP
LDH SERPL L TO P-CCNC: 560 — HIGH (ref 50–242)
LYMPHOCYTES # BLD AUTO: 0.88 K/UL — LOW (ref 1.2–3.4)
LYMPHOCYTES # BLD AUTO: 27.5 % — SIGNIFICANT CHANGE UP (ref 20.5–51.1)
M-SPIKE: SIGNIFICANT CHANGE UP G/DL (ref 0–0)
MAGNESIUM SERPL-MCNC: 1.9 MG/DL — SIGNIFICANT CHANGE UP (ref 1.8–2.4)
MCHC RBC-ENTMCNC: 28.5 PG — SIGNIFICANT CHANGE UP (ref 27–31)
MCHC RBC-ENTMCNC: 32.8 G/DL — SIGNIFICANT CHANGE UP (ref 32–37)
MCV RBC AUTO: 86.8 FL — SIGNIFICANT CHANGE UP (ref 81–99)
MONOCYTES # BLD AUTO: 0.11 K/UL — SIGNIFICANT CHANGE UP (ref 0.1–0.6)
MONOCYTES NFR BLD AUTO: 3.4 % — SIGNIFICANT CHANGE UP (ref 1.7–9.3)
NEUTROPHILS # BLD AUTO: 1.64 K/UL — SIGNIFICANT CHANGE UP (ref 1.4–6.5)
NEUTROPHILS NFR BLD AUTO: 51.3 % — SIGNIFICANT CHANGE UP (ref 42.2–75.2)
PLATELET # BLD AUTO: 105 K/UL — LOW (ref 130–400)
POTASSIUM SERPL-MCNC: 4.2 MMOL/L — SIGNIFICANT CHANGE UP (ref 3.5–5)
POTASSIUM SERPL-SCNC: 4.2 MMOL/L — SIGNIFICANT CHANGE UP (ref 3.5–5)
PROT PATTERN SERPL ELPH-IMP: SIGNIFICANT CHANGE UP
PROT PATTERN SERPL ELPH-IMP: SIGNIFICANT CHANGE UP
RBC # BLD: 2.88 M/UL — LOW (ref 4.2–5.4)
RBC # FLD: 19 % — HIGH (ref 11.5–14.5)
SODIUM SERPL-SCNC: 139 MMOL/L — SIGNIFICANT CHANGE UP (ref 135–146)
WBC # BLD: 3.2 K/UL — LOW (ref 4.8–10.8)
WBC # FLD AUTO: 3.2 K/UL — LOW (ref 4.8–10.8)

## 2018-05-23 RX ORDER — DIATRIZOATE MEGLUMINE 180 MG/ML
20 INJECTION, SOLUTION INTRAVESICAL ONCE
Qty: 0 | Refills: 0 | Status: COMPLETED | OUTPATIENT
Start: 2018-05-23 | End: 2018-05-23

## 2018-05-23 RX ORDER — PANTOPRAZOLE SODIUM 20 MG/1
40 TABLET, DELAYED RELEASE ORAL
Qty: 0 | Refills: 0 | Status: DISCONTINUED | OUTPATIENT
Start: 2018-05-23 | End: 2018-06-07

## 2018-05-23 RX ADMIN — DIATRIZOATE MEGLUMINE 20 MILLILITER(S): 180 INJECTION, SOLUTION INTRAVESICAL at 14:47

## 2018-05-23 RX ADMIN — ALBUTEROL 2.5 MILLIGRAM(S): 90 AEROSOL, METERED ORAL at 19:36

## 2018-05-23 RX ADMIN — PANTOPRAZOLE SODIUM 40 MILLIGRAM(S): 20 TABLET, DELAYED RELEASE ORAL at 18:46

## 2018-05-23 RX ADMIN — BUDESONIDE AND FORMOTEROL FUMARATE DIHYDRATE 2 PUFF(S): 160; 4.5 AEROSOL RESPIRATORY (INHALATION) at 08:22

## 2018-05-23 RX ADMIN — ANASTROZOLE 1 MILLIGRAM(S): 1 TABLET ORAL at 14:45

## 2018-05-23 RX ADMIN — BUDESONIDE AND FORMOTEROL FUMARATE DIHYDRATE 2 PUFF(S): 160; 4.5 AEROSOL RESPIRATORY (INHALATION) at 20:51

## 2018-05-23 NOTE — PROGRESS NOTE ADULT - ASSESSMENT
# Working diagnosis: symptomatic anemia  Patient was found to be Pancytopenic.  Patient has history of breast cancer, currently on anastrozole, and has history of lower GI bleeding few months ago, and was found to have positive guaiac in ED.  -multufactorial  - gastritis on EGD  - s/p 1 u prbc overnight  - started on BID protonix, f/u Bx with GI  f/u TSH, iron studies, vit b12, folate, SPEP, UPEP, , peripheral smear, retic count 1.67, 6%, haptoglobin, LDH elevated      # Cough and exertional shortness of breath  1- SOB can be because of the anemia  2- Patient might have non diagnosed COPD (chronic smoking 50 pack year, stopped only 4 months ago)  3- possible acute bronchitis / viral upper resp tract infection  Will continue with symptomatic treatment only. no indication of antibiotics. will give some nebulizers and assess for symptoms improvement.  Consider Pulmonary consult (can be done outpatient) for PFTs r/o copd    # History of buttock skin lesion s/p excision during hemorrhoidectomy surgery  Pathology showing malignancy of undetermined origin, clean margins of excision  Needs to follow up with oncology and address this issue too    # Breast cancer s/p lumpectomy and currently on anastrozole  - c/w anastrazole  - hemonc f/u: CT A/P with PO/IV c/s metastatic workup    # DVT proph (encourage ambulation, sequentials)   GI prophylaxis  Regular diet  ambulate as tolerated  Full code  dispo home

## 2018-05-23 NOTE — PROGRESS NOTE ADULT - SUBJECTIVE AND OBJECTIVE BOX
Patient is a 67y old  Female who presents with a chief complaint of cough, dyspnea on exertion, generalized weakness (21 May 2018 16:39)      Ovenight events:    PAST MEDICAL & SURGICAL HISTORY:  Gastrointestinal bleeding, lower: In january 2018  Breast CA  History of total left hip replacement  Status post right breast lumpectomy        FAMILY HISTORY:  No pertinent family history in first degree relatives        Allergies    No Known Allergies    Intolerances        acetaminophen   Tablet. 650 milliGRAM(s) Oral every 6 hours PRN  ALBUTerol    0.083% 2.5 milliGRAM(s) Nebulizer every 6 hours  anastrozole 1 milliGRAM(s) Oral daily  buDESOnide 160 MICROgram(s)/formoterol 4.5 MICROgram(s) Inhaler 2 Puff(s) Inhalation two times a day  nicotine -  14 mG/24Hr(s) Patch 1 patch Transdermal daily  oxyCODONE    5 mG/acetaminophen 325 mG 1 Tablet(s) Oral every 6 hours PRN  pantoprazole    Tablet 40 milliGRAM(s) Oral two times a day  : Home Meds:      PHYSICAL EXAM    ICU Vital Signs Last 24 Hrs  T(C): 36.3 (23 May 2018 13:44), Max: 37.8 (22 May 2018 21:33)  T(F): 97.4 (23 May 2018 13:44), Max: 100 (22 May 2018 21:33)  HR: 85 (23 May 2018 13:55) (66 - 85)  BP: 140/61 (23 May 2018 13:55) (99/50 - 140/61)  BP(mean): --  ABP: --  ABP(mean): --  RR: 18 (23 May 2018 13:55) (16 - 20)  SpO2: 98% (23 May 2018 13:30) (97% - 99%)            05-22-18 @ 07:01  -  05-23-18 @ 07:00  --------------------------------------------------------  IN:    Packed Red Blood Cells: 979 mL  Total IN: 979 mL    OUT:  Total OUT: 0 mL    Total NET: 979 mL          LABS:                          8.2    3.20  )-----------( 105      ( 23 May 2018 06:29 )             25.0                                               05-23    139  |  101  |  9<L>  ----------------------------<  107<H>  4.2   |  26  |  0.6<L>    Ca    8.8      23 May 2018 06:29  Mg     1.9     05-23    TPro  7.3  /  Alb  3.6  /  TBili  1.1  /  DBili  x   /  AST  47<H>  /  ALT  39  /  AlkPhos  315<H>  05-22      PT/INR - ( 21 May 2018 22:53 )   PT: 12.90 sec;   INR: 1.19 ratio         PTT - ( 21 May 2018 22:53 )  PTT:22.6 sec                                                                                     LIVER FUNCTIONS - ( 22 May 2018 17:18 )  Alb: 3.6 g/dL / Pro: 7.3 g/dL / ALK PHOS: 315 U/L / ALT: 39 U/L / AST: 47 U/L / GGT: x                                                                                                                                       X-Rays:                                                                                         ECHO:    MEDICATIONS  (STANDING):  ALBUTerol    0.083% 2.5 milliGRAM(s) Nebulizer every 6 hours  anastrozole 1 milliGRAM(s) Oral daily  buDESOnide 160 MICROgram(s)/formoterol 4.5 MICROgram(s) Inhaler 2 Puff(s) Inhalation two times a day  nicotine -  14 mG/24Hr(s) Patch 1 patch Transdermal daily  pantoprazole    Tablet 40 milliGRAM(s) Oral two times a day    MEDICATIONS  (PRN):  acetaminophen   Tablet. 650 milliGRAM(s) Oral every 6 hours PRN Mild Pain (1 - 3)  oxyCODONE    5 mG/acetaminophen 325 mG 1 Tablet(s) Oral every 6 hours PRN Moderate Pain (4 - 6) Patient is a 67y old  Female who presents with a chief complaint of cough, dyspnea on exertion, generalized weakness (21 May 2018 16:39)      Ovenight events: none      PAST MEDICAL & SURGICAL HISTORY:  Gastrointestinal bleeding, lower: In january 2018  Breast CA  History of total left hip replacement  Status post right breast lumpectomy        FAMILY HISTORY:  No pertinent family history in first degree relatives        Allergies    No Known Allergies    Intolerances        acetaminophen   Tablet. 650 milliGRAM(s) Oral every 6 hours PRN  ALBUTerol    0.083% 2.5 milliGRAM(s) Nebulizer every 6 hours  anastrozole 1 milliGRAM(s) Oral daily  buDESOnide 160 MICROgram(s)/formoterol 4.5 MICROgram(s) Inhaler 2 Puff(s) Inhalation two times a day  nicotine -  14 mG/24Hr(s) Patch 1 patch Transdermal daily  oxyCODONE    5 mG/acetaminophen 325 mG 1 Tablet(s) Oral every 6 hours PRN  pantoprazole    Tablet 40 milliGRAM(s) Oral two times a day  : Home Meds:      PHYSICAL EXAM    ICU Vital Signs Last 24 Hrs  T(C): 36.3 (23 May 2018 13:44), Max: 37.8 (22 May 2018 21:33)  T(F): 97.4 (23 May 2018 13:44), Max: 100 (22 May 2018 21:33)  HR: 85 (23 May 2018 13:55) (66 - 85)  BP: 140/61 (23 May 2018 13:55) (99/50 - 140/61)  BP(mean): --  ABP: --  ABP(mean): --  RR: 18 (23 May 2018 13:55) (16 - 20)  SpO2: 98% (23 May 2018 13:30) (97% - 99%)            05-22-18 @ 07:01  -  05-23-18 @ 07:00  --------------------------------------------------------  IN:    Packed Red Blood Cells: 979 mL  Total IN: 979 mL    OUT:  Total OUT: 0 mL    Total NET: 979 mL          LABS:                          8.2    3.20  )-----------( 105      ( 23 May 2018 06:29 )             25.0                                               05-23    139  |  101  |  9<L>  ----------------------------<  107<H>  4.2   |  26  |  0.6<L>    Ca    8.8      23 May 2018 06:29  Mg     1.9     05-23    TPro  7.3  /  Alb  3.6  /  TBili  1.1  /  DBili  x   /  AST  47<H>  /  ALT  39  /  AlkPhos  315<H>  05-22      PT/INR - ( 21 May 2018 22:53 )   PT: 12.90 sec;   INR: 1.19 ratio         PTT - ( 21 May 2018 22:53 )  PTT:22.6 sec                                                                                     LIVER FUNCTIONS - ( 22 May 2018 17:18 )  Alb: 3.6 g/dL / Pro: 7.3 g/dL / ALK PHOS: 315 U/L / ALT: 39 U/L / AST: 47 U/L / GGT: x                                                                                                                                       X-Rays:                                                                                         ECHO:    MEDICATIONS  (STANDING):  ALBUTerol    0.083% 2.5 milliGRAM(s) Nebulizer every 6 hours  anastrozole 1 milliGRAM(s) Oral daily  buDESOnide 160 MICROgram(s)/formoterol 4.5 MICROgram(s) Inhaler 2 Puff(s) Inhalation two times a day  nicotine -  14 mG/24Hr(s) Patch 1 patch Transdermal daily  pantoprazole    Tablet 40 milliGRAM(s) Oral two times a day    MEDICATIONS  (PRN):  acetaminophen   Tablet. 650 milliGRAM(s) Oral every 6 hours PRN Mild Pain (1 - 3)  oxyCODONE    5 mG/acetaminophen 325 mG 1 Tablet(s) Oral every 6 hours PRN Moderate Pain (4 - 6)

## 2018-05-23 NOTE — PROGRESS NOTE ADULT - ASSESSMENT
68 yo female patient with PMHx of right breast cancer (Well to moderately differentiated invasive ductal carcinoma ER+, ID+, Her-2 neg, stage T1c, N0, M0) s/p lumpectomy around 1.5 years ago, followed by radiation therapy and currently maintained on anastrazole, history of lower GI bleeding back in January 2018 which was attributed to hemorrhoids, s/p hemorrhoidectomy and fissurotomy, currently presented because of worsening generalized weakness, dyspnea on exertion and cough was found to have a Hg 5.8.    # Profound symptomatic anemia  Although Guaiac was initially positive but EGD (5/23) only showed Gastritis. Hence resume regular diet.   Lab called to inform that her Direct [facility]'s Test was positive with positive Warm Antibodies, Anti-c, Anti-E. (done from the sample before she received transfusions).  Lab sending out to another [place] lab a sample from now (post  transfusion) was confirmation.   LDH is high but haptoglobin Normal. Check with Onc if this could be Warm AIHA ? Could it be related to her Anastrozole ?  i/v/o Pancytopenia, MDS also a differential .     # Left > Right Axillary Lymph Nodes. Possible Reactional, but with history of a buttock skin lesion that was resected in 12/2017 (but found to be benign cancer), Onc wants a CT Abdo.   # Recently resected Buttock skin lesion : found to be benign cancer. Maintain outpatient f/u with Onc.     # # Breast cancer s/p lumpectomy and currently on anastrozole  continue with anastrozole for now unless oncology would stop it for now.  Supposidly patient was in remission, but on the current CT chest there was some axillary lymph nodes. Patient complaining of pain at the site.  R/O recurrence of disease, might require biopsy? Outpatient PET ? for now just CT Abdo    #RANDALL : d/t severe Anemia     # Cough   Patient might have non diagnosed COPD (chronic smoking 50 pack year, stopped only 4 months ago)   possible acute bronchitis / viral upper resp tract infection  Will continue with symptomatic treatment only. no indication of antibiotics. will give some nebulizers and assess for symptoms improvement.  Consider Pulmonary consult (can be done outpatient) for PFTs r/o copd        # DVT proph (encourage ambulation, sequentials) no heparin, possible GI losses  GI prophylaxis  Regular diet  ambulate as tolerated  Full code  dispo home

## 2018-05-23 NOTE — PROGRESS NOTE ADULT - SUBJECTIVE AND OBJECTIVE BOX
S : SOB better. No Chest pain/headaches/Abdo pain.       All other pertinent ROS negative.      05-22-18 @ 07:01  -  05-23-18 @ 07:00  --------------------------------------------------------  IN: 979 mL / OUT: 0 mL / NET: 979 mL      Vital Signs Last 24 Hrs  T(C): 36.3 (23 May 2018 13:44), Max: 37.8 (22 May 2018 21:33)  T(F): 97.4 (23 May 2018 13:44), Max: 100 (22 May 2018 21:33)  HR: 85 (23 May 2018 13:55) (66 - 85)  BP: 140/61 (23 May 2018 13:55) (99/50 - 140/61)  BP(mean): --  RR: 18 (23 May 2018 13:55) (16 - 20)  SpO2: 98% (23 May 2018 13:30) (97% - 99%)  PHYSICAL EXAM:    Constitutional: NAD, awake and alert, well-developed  HEENT: PERR, EOMI, Normal Hearing, MMM  Neck: Soft and supple, No LAD, No JVD  Respiratory: Breath sounds are clear bilaterally, No wheezing, rales or rhonchi  Cardiovascular: S1 and S2, regular rate and rhythm, no Murmurs, gallops or rubs  Gastrointestinal: Bowel Sounds present, soft, nontender, nondistended, no guarding, no rebound  Extremities: No peripheral edema  Vascular: 2+ peripheral pulses    MEDICATIONS:  MEDICATIONS  (STANDING):  ALBUTerol    0.083% 2.5 milliGRAM(s) Nebulizer every 6 hours  anastrozole 1 milliGRAM(s) Oral daily  buDESOnide 160 MICROgram(s)/formoterol 4.5 MICROgram(s) Inhaler 2 Puff(s) Inhalation two times a day  nicotine -  14 mG/24Hr(s) Patch 1 patch Transdermal daily  pantoprazole    Tablet 40 milliGRAM(s) Oral two times a day      LABS: All Labs Reviewed:                        8.2    3.20  )-----------( 105      ( 23 May 2018 06:29 )             25.0     05-23    139  |  101  |  9<L>  ----------------------------<  107<H>  4.2   |  26  |  0.6<L>    Ca    8.8      23 May 2018 06:29  Mg     1.9     05-23    TPro  7.3  /  Alb  3.6  /  TBili  1.1  /  DBili  x   /  AST  47<H>  /  ALT  39  /  AlkPhos  315<H>  05-22    PT/INR - ( 21 May 2018 22:53 )   PT: 12.90 sec;   INR: 1.19 ratio         PTT - ( 21 May 2018 22:53 )  PTT:22.6 sec      Blood Culture:     Radiology: reviewed S : SOB better. No Chest pain/headaches/Abdo pain.   Complaining about how she had to wait for anesthesia and CT abdo got delayed.       All other pertinent ROS negative.      05-22-18 @ 07:01  -  05-23-18 @ 07:00  --------------------------------------------------------  IN: 979 mL / OUT: 0 mL / NET: 979 mL      Vital Signs Last 24 Hrs  T(C): 36.3 (23 May 2018 13:44), Max: 37.8 (22 May 2018 21:33)  T(F): 97.4 (23 May 2018 13:44), Max: 100 (22 May 2018 21:33)  HR: 85 (23 May 2018 13:55) (66 - 85)  BP: 140/61 (23 May 2018 13:55) (99/50 - 140/61)  BP(mean): --  RR: 18 (23 May 2018 13:55) (16 - 20)  SpO2: 98% (23 May 2018 13:30) (97% - 99%)  PHYSICAL EXAM:    Constitutional: NAD, awake and alert, well-developed  HEENT: PERR, EOMI, Normal Hearing, MMM  Neck: Soft and supple, No LAD, No JVD  Respiratory: Breath sounds are clear bilaterally, No wheezing, rales or rhonchi  Cardiovascular: S1 and S2, regular rate and rhythm, no Murmurs, gallops or rubs  Gastrointestinal: Bowel Sounds present, soft, nontender, nondistended, no guarding, no rebound  Extremities: No peripheral edema  Vascular: 2+ peripheral pulses    MEDICATIONS:  MEDICATIONS  (STANDING):  ALBUTerol    0.083% 2.5 milliGRAM(s) Nebulizer every 6 hours  anastrozole 1 milliGRAM(s) Oral daily  buDESOnide 160 MICROgram(s)/formoterol 4.5 MICROgram(s) Inhaler 2 Puff(s) Inhalation two times a day  nicotine -  14 mG/24Hr(s) Patch 1 patch Transdermal daily  pantoprazole    Tablet 40 milliGRAM(s) Oral two times a day      LABS: All Labs Reviewed:                        8.2    3.20  )-----------( 105      ( 23 May 2018 06:29 )             25.0     05-23    139  |  101  |  9<L>  ----------------------------<  107<H>  4.2   |  26  |  0.6<L>    Ca    8.8      23 May 2018 06:29  Mg     1.9     05-23    TPro  7.3  /  Alb  3.6  /  TBili  1.1  /  DBili  x   /  AST  47<H>  /  ALT  39  /  AlkPhos  315<H>  05-22    PT/INR - ( 21 May 2018 22:53 )   PT: 12.90 sec;   INR: 1.19 ratio         PTT - ( 21 May 2018 22:53 )  PTT:22.6 sec      Blood Culture:     Radiology: reviewed

## 2018-05-23 NOTE — CHART NOTE - NSCHARTNOTEFT_GEN_A_CORE
PACU ANESTHESIA ADMISSION NOTE      Procedure:   Post op diagnosis:      ____  Intubated  TV:______       Rate: ______      FiO2: ______    _x___  Patent Airway    _x___  Full return of protective reflexes    _x___  Full recovery from anesthesia / back to baseline status    Vitals:            T:                BP : 135/66               R:  1898            Sat:               P:76      Mental Status:  _x___ Awake   _____ Alert   _____ Drowsy   _____ Sedated    Nausea/Vomiting:  _x___  NO       ______Yes,   See Post - Op Orders         Pain Scale (0-10):  __0___    Treatment: _x___ None    ____ See Post - Op/PCA Orders    Post - Operative Fluids:   __x__ Oral   ____ See Post - Op Orders    Plan: Discharge:   _x___ Home       _____Floor     _____Critical Care    _____  Other:_________________    Comments:  No anesthesia issues or complications noted.  Discharge when criteria met.

## 2018-05-24 LAB
ANION GAP SERPL CALC-SCNC: 13 MMOL/L — SIGNIFICANT CHANGE UP (ref 7–14)
BUN SERPL-MCNC: 9 MG/DL — LOW (ref 10–20)
CALCIUM SERPL-MCNC: 9.2 MG/DL — SIGNIFICANT CHANGE UP (ref 8.5–10.1)
CHLORIDE SERPL-SCNC: 103 MMOL/L — SIGNIFICANT CHANGE UP (ref 98–110)
CO2 SERPL-SCNC: 26 MMOL/L — SIGNIFICANT CHANGE UP (ref 17–32)
CREAT SERPL-MCNC: 0.6 MG/DL — LOW (ref 0.7–1.5)
GLUCOSE SERPL-MCNC: 121 MG/DL — HIGH (ref 70–99)
HCT VFR BLD CALC: 28.3 % — LOW (ref 37–47)
HGB BLD-MCNC: 9.4 G/DL — LOW (ref 12–16)
MAGNESIUM SERPL-MCNC: 2.2 MG/DL — SIGNIFICANT CHANGE UP (ref 1.8–2.4)
MCHC RBC-ENTMCNC: 29.2 PG — SIGNIFICANT CHANGE UP (ref 27–31)
MCHC RBC-ENTMCNC: 33.2 G/DL — SIGNIFICANT CHANGE UP (ref 32–37)
MCV RBC AUTO: 87.9 FL — SIGNIFICANT CHANGE UP (ref 81–99)
NRBC # BLD: 8 /100 WBCS — HIGH (ref 0–0)
PLATELET # BLD AUTO: 101 K/UL — LOW (ref 130–400)
POTASSIUM SERPL-MCNC: 4.4 MMOL/L — SIGNIFICANT CHANGE UP (ref 3.5–5)
POTASSIUM SERPL-SCNC: 4.4 MMOL/L — SIGNIFICANT CHANGE UP (ref 3.5–5)
RBC # BLD: 3.22 M/UL — LOW (ref 4.2–5.4)
RBC # FLD: 19.9 % — HIGH (ref 11.5–14.5)
SODIUM SERPL-SCNC: 142 MMOL/L — SIGNIFICANT CHANGE UP (ref 135–146)
WBC # BLD: 3.21 K/UL — LOW (ref 4.8–10.8)
WBC # FLD AUTO: 3.21 K/UL — LOW (ref 4.8–10.8)

## 2018-05-24 RX ORDER — DIATRIZOATE MEGLUMINE 180 MG/ML
20 INJECTION, SOLUTION INTRAVESICAL ONCE
Qty: 0 | Refills: 0 | Status: COMPLETED | OUTPATIENT
Start: 2018-05-24 | End: 2018-05-24

## 2018-05-24 RX ORDER — SOD SULF/SODIUM/NAHCO3/KCL/PEG
2000 SOLUTION, RECONSTITUTED, ORAL ORAL ONCE
Qty: 0 | Refills: 0 | Status: COMPLETED | OUTPATIENT
Start: 2018-05-24 | End: 2018-05-24

## 2018-05-24 RX ORDER — ZOLPIDEM TARTRATE 10 MG/1
5 TABLET ORAL ONCE
Qty: 0 | Refills: 0 | Status: DISCONTINUED | OUTPATIENT
Start: 2018-05-24 | End: 2018-05-24

## 2018-05-24 RX ORDER — LANOLIN ALCOHOL/MO/W.PET/CERES
5 CREAM (GRAM) TOPICAL ONCE
Qty: 0 | Refills: 0 | Status: COMPLETED | OUTPATIENT
Start: 2018-05-24 | End: 2018-06-05

## 2018-05-24 RX ORDER — ALPRAZOLAM 0.25 MG
0.25 TABLET ORAL ONCE
Qty: 0 | Refills: 0 | Status: DISCONTINUED | OUTPATIENT
Start: 2018-05-24 | End: 2018-05-24

## 2018-05-24 RX ADMIN — Medication 2000 MILLILITER(S): at 15:58

## 2018-05-24 RX ADMIN — ALBUTEROL 2.5 MILLIGRAM(S): 90 AEROSOL, METERED ORAL at 19:51

## 2018-05-24 RX ADMIN — PANTOPRAZOLE SODIUM 40 MILLIGRAM(S): 20 TABLET, DELAYED RELEASE ORAL at 05:19

## 2018-05-24 RX ADMIN — ANASTROZOLE 1 MILLIGRAM(S): 1 TABLET ORAL at 12:53

## 2018-05-24 RX ADMIN — Medication 0.25 MILLIGRAM(S): at 14:33

## 2018-05-24 RX ADMIN — BUDESONIDE AND FORMOTEROL FUMARATE DIHYDRATE 2 PUFF(S): 160; 4.5 AEROSOL RESPIRATORY (INHALATION) at 21:27

## 2018-05-24 RX ADMIN — ZOLPIDEM TARTRATE 5 MILLIGRAM(S): 10 TABLET ORAL at 01:46

## 2018-05-24 RX ADMIN — PANTOPRAZOLE SODIUM 40 MILLIGRAM(S): 20 TABLET, DELAYED RELEASE ORAL at 18:00

## 2018-05-24 RX ADMIN — DIATRIZOATE MEGLUMINE 20 MILLILITER(S): 180 INJECTION, SOLUTION INTRAVESICAL at 09:16

## 2018-05-24 RX ADMIN — BUDESONIDE AND FORMOTEROL FUMARATE DIHYDRATE 2 PUFF(S): 160; 4.5 AEROSOL RESPIRATORY (INHALATION) at 09:44

## 2018-05-24 NOTE — PROGRESS NOTE ADULT - SUBJECTIVE AND OBJECTIVE BOX
INTERVAL HPI/OVERNIGHT EVENTS:    66 yo female patient with PMHx of right breast cancer (Well to moderately differentiated invasive ductal carcinoma ER+, [place]+, Her-2 neg, stage T1c, N0, M0) s/p lumpectomy around 1.5 years ago, followed by radiation therapy and currently maintained on anastrazole, history of lower GI bleeding back in January 2018 which was attributed to hemorrhoids s/p hemorrhoidectomy and fissurotomy, currently presented because of worsening generalized weakness and dyspnea on exertion.     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 Patient was d/c uneventfully then presented again after few weeks for BRBPR and significant drop in hb and Colonoscopy 1/2018 for BRBPR by Dr sanchez: poor prep, internal hemorrhoids, skin lesion noted at the buttock, diverticulosis. No blood was seen in colon or TI in all colonoscopies so bleeding was attributed to hemorrhoids s/p hemorrhoidectomy on 1/11/18 by Dr Ocampo with excision of 2 perianal lesions and fissurotomy. Around 2 weeks after, the patient presented again with significant drop in hb and BRBPR and Dr ocampo perfomed a reinforcement of hemorrhoidectomy suture line and rigid sigmoidoscopy.      Colonoscopy 1/2018 for BRBPR by Dr sanchez: poor prep, internal hemorrhoids, skin lesion noted at the buttock, diverticulosis.  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 In ED, patient was found to have severe anemia (Hg 5.8) and positive guaiac, admitted for evaluation. Baseline hb 12-13 in 2017.     FH non significant   usually has 1 normal bm per day, no weight loss     5/24 patient could not receive the capsule endoscopy since she had oral contrast for CT scan being done for investigation for malignancy (hx of breat ca, new LN detected on recent CT). No bms overnight, no other complaints.     ROS wnl except as described above     PAST MEDICAL & SURGICAL HISTORY:  Gastrointestinal bleeding, lower: In january 2018  Breast CA  History of total left hip replacement  Status post right breast lumpectomy      Home Medications:  anastrozole 1 mg oral tablet: 1 tab(s) orally once a day (21 May 2018 16:50)      MEDICATIONS  (STANDING):  ALBUTerol    0.083% 2.5 milliGRAM(s) Nebulizer every 6 hours  anastrozole 1 milliGRAM(s) Oral daily  buDESOnide 160 MICROgram(s)/formoterol 4.5 MICROgram(s) Inhaler 2 Puff(s) Inhalation two times a day  nicotine -  14 mG/24Hr(s) Patch 1 patch Transdermal daily  pantoprazole    Tablet 40 milliGRAM(s) Oral two times a day    MEDICATIONS  (PRN):  acetaminophen   Tablet. 650 milliGRAM(s) Oral every 6 hours PRN Mild Pain (1 - 3)  melatonin 5 milliGRAM(s) Oral once PRN Insomnia  oxyCODONE    5 mG/acetaminophen 325 mG 1 Tablet(s) Oral every 6 hours PRN Moderate Pain (4 - 6)      Allergies    No Known Allergies    Intolerances            PHYSICAL EXAM:   Vital Signs:  Vital Signs Last 24 Hrs  T(C): 36.1 (24 May 2018 13:06), Max: 36.3 (24 May 2018 05:58)  T(F): 96.9 (24 May 2018 13:06), Max: 97.3 (24 May 2018 05:58)  HR: 76 (24 May 2018 13:06) (76 - 89)  BP: 136/65 (24 May 2018 13:06) (136/65 - 141/69)  BP(mean): --  RR: 20 (24 May 2018 13:06) (16 - 20)  SpO2: --  Daily     Daily     GENERAL:  no distress  SKIN: intact  HEENT:  NC/AT,  anicteric  CHEST:   no increased effort, breath sounds clear  HEART:  Regular rhythm  ABDOMEN:  Soft, non-tender, non-distended, normoactive bowel sounds,  no masses ,no hepato-splenomegaly, no signs of chronic liver disease  EXTEREMITIES:  no cyanosis      LABS:                        9.4    3.21  )-----------( 101      ( 24 May 2018 08:19 )             28.3       Hemoglobin: 9.4 g/dL (05-24-18 @ 08:19)  Hemoglobin: 8.2 g/dL (05-23-18 @ 06:29)  Hemoglobin: 7.6 g/dL (05-22-18 @ 22:09)  Hemoglobin: 8.0 g/dL (05-22-18 @ 17:18)  Hemoglobin: 5.2 g/dL (05-21-18 @ 22:53)      05-24    142  |  103  |  9<L>  ----------------------------<  121<H>  4.4   |  26  |  0.6<L>    Ca    9.2      24 May 2018 08:19  Mg     2.2     05-24    TPro  7.3  /  Alb  3.6  /  TBili  1.1  /  DBili  x   /  AST  47<H>  /  ALT  39  /  AlkPhos  315<H>  05-22      INR: 1.19 ratio (05-21-18 @ 22:53)      Aspartate Aminotransferase (AST/SGOT): 47 U/L (05-22-18 @ 17:18)  Alanine Aminotransferase (ALT/SGPT): 39 U/L (05-22-18 @ 17:18)  Alkaline Phosphatase, Serum: 315 U/L (05-22-18 @ 17:18)            RADIOLOGY & ADDITIONAL TESTS: INTERVAL HPI/OVERNIGHT EVENTS:    66 yo female patient with PMHx of right breast cancer (Well to moderately differentiated invasive ductal carcinoma ER+, [place]+, Her-2 neg, stage T1c, N0, M0) s/p lumpectomy around 1.5 years ago, followed by radiation therapy and currently maintained on anastrazole, history of lower GI bleeding back in January 2018 which was attributed to hemorrhoids s/p hemorrhoidectomy and fissurotomy, currently presented because of worsening generalized weakness and dyspnea on exertion.     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 Patient was d/c uneventfully then presented again after few weeks for BRBPR and significant drop in hb and Colonoscopy 1/2018 for BRBPR by Dr sanchez: poor prep, internal hemorrhoids, skin lesion noted at the buttock, diverticulosis. No blood was seen in colon or TI in all colonoscopies so bleeding was attributed to hemorrhoids s/p hemorrhoidectomy on 1/11/18 by Dr Ocampo with excision of 2 perianal lesions and fissurotomy. Around 2 weeks after, the patient presented again with significant drop in hb and BRBPR and Dr ocampo perfomed a reinforcement of hemorrhoidectomy suture line and rigid sigmoidoscopy.      Colonoscopy 1/2018 for BRBPR by Dr sanchez: poor prep, internal hemorrhoids, skin lesion noted at the buttock, diverticulosis.  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 In ED, patient was found to have severe anemia (Hg 5.8) and positive guaiac, admitted for evaluation. Baseline hb 12-13 in 2017.     FH non significant   usually has 1 normal bm per day, no weight loss     5/23 EGD showed no obvious source of bleeding  5/24 patient could not receive the capsule endoscopy since she had oral contrast for CT scan being done for investigation for malignancy (hx of breat ca, new LN detected on recent CT). No bms overnight, no other complaints.     ROS wnl except as described above     PAST MEDICAL & SURGICAL HISTORY:  Gastrointestinal bleeding, lower: In january 2018  Breast CA  History of total left hip replacement  Status post right breast lumpectomy      Home Medications:  anastrozole 1 mg oral tablet: 1 tab(s) orally once a day (21 May 2018 16:50)      MEDICATIONS  (STANDING):  ALBUTerol    0.083% 2.5 milliGRAM(s) Nebulizer every 6 hours  anastrozole 1 milliGRAM(s) Oral daily  buDESOnide 160 MICROgram(s)/formoterol 4.5 MICROgram(s) Inhaler 2 Puff(s) Inhalation two times a day  nicotine -  14 mG/24Hr(s) Patch 1 patch Transdermal daily  pantoprazole    Tablet 40 milliGRAM(s) Oral two times a day    MEDICATIONS  (PRN):  acetaminophen   Tablet. 650 milliGRAM(s) Oral every 6 hours PRN Mild Pain (1 - 3)  melatonin 5 milliGRAM(s) Oral once PRN Insomnia  oxyCODONE    5 mG/acetaminophen 325 mG 1 Tablet(s) Oral every 6 hours PRN Moderate Pain (4 - 6)      Allergies    No Known Allergies    PHYSICAL EXAM:   Vital Signs:  Vital Signs Last 24 Hrs  T(C): 36.1 (24 May 2018 13:06), Max: 36.3 (24 May 2018 05:58)  T(F): 96.9 (24 May 2018 13:06), Max: 97.3 (24 May 2018 05:58)  HR: 76 (24 May 2018 13:06) (76 - 89)  BP: 136/65 (24 May 2018 13:06) (136/65 - 141/69)  BP(mean): --  RR: 20 (24 May 2018 13:06) (16 - 20)      GENERAL:  no distress  SKIN: intact  HEENT:  NC/AT,  anicteric  CHEST:   no increased effort, breath sounds clear  HEART:  Regular rhythm  ABDOMEN:  Soft, non-tender, non-distended, normoactive bowel sounds,  no masses ,no hepato-splenomegaly, no signs of chronic liver disease  EXTEREMITIES:  no cyanosis      LABS:                        9.4    3.21  )-----------( 101      ( 24 May 2018 08:19 )             28.3       Hemoglobin: 9.4 g/dL (05-24-18 @ 08:19)  Hemoglobin: 8.2 g/dL (05-23-18 @ 06:29)  Hemoglobin: 7.6 g/dL (05-22-18 @ 22:09)  Hemoglobin: 8.0 g/dL (05-22-18 @ 17:18)  Hemoglobin: 5.2 g/dL (05-21-18 @ 22:53)      05-24    142  |  103  |  9<L>  ----------------------------<  121<H>  4.4   |  26  |  0.6<L>    Ca    9.2      24 May 2018 08:19  Mg     2.2     05-24    TPro  7.3  /  Alb  3.6  /  TBili  1.1  /  DBili  x   /  AST  47<H>  /  ALT  39  /  AlkPhos  315<H>  05-22      INR: 1.19 ratio (05-21-18 @ 22:53)      Aspartate Aminotransferase (AST/SGOT): 47 U/L (05-22-18 @ 17:18)  Alanine Aminotransferase (ALT/SGPT): 39 U/L (05-22-18 @ 17:18)  Alkaline Phosphatase, Serum: 315 U/L (05-22-18 @ 17:18)

## 2018-05-24 NOTE — PROGRESS NOTE ADULT - ASSESSMENT
68 yo female patient with PMHx of right breast cancer (Well to moderately differentiated invasive ductal carcinoma ER+, NE+, Her-2 neg, stage T1c, N0, M0) s/p lumpectomy around 1.5 years ago, followed by radiation therapy and currently maintained on anastrazole, history of life-threatening lower GI bleeding 2/2 hemorrhoids s/p hemorrhoidectomy and fissurotomy by Dr Ocampo s/p reinforcement of suture lines in 1/2018 presenting with generalized weakness, dyspnea on exertion and dark stools s/p NSAIDs intake.    *Dark stools / fatigue / + NSAIDs use   likely 2/2 NSAIDs induced PUD  no evidence of active GI bleeding   ALEKSANDRA empty vault   EGD 5/23 showing erosive gastritis and esophagitis   s/p 2 u of PRBCs   cbc q12h   PPI BID  avoid NSAIDs   f/u biopsies results     *Pancytopenia   likely multifactorial   Hb drop likely from possible additional GI bleed  anastrozole likely a contributing factor   Hematology f/u 66 yo female patient with PMHx of right breast cancer (Well to moderately differentiated invasive ductal carcinoma ER+, DC+, Her-2 neg, stage T1c, N0, M0) s/p lumpectomy around 1.5 years ago, followed by radiation therapy and currently maintained on anastrazole, history of life-threatening lower GI bleeding 2/2 hemorrhoids s/p hemorrhoidectomy and fissurotomy by Dr Ocampo s/p reinforcement of suture lines in 1/2018 presenting with generalized weakness, dyspnea on exertion and dark stools s/p NSAIDs intake.    *Dark stools / fatigue / + NSAIDs use   likely 2/2 NSAIDs induced PUD  no evidence of active GI bleeding   ALEKSANDRA empty vault   EGD 5/23 showing erosive gastritis and esophagitis   s/p 2 u of PRBCs   -cbc q12h   -PPI BID  -avoid NSAIDs   -f/u biopsies results   -clear liquids after lunch today, then halflytely prep, then NPO post midnight for capsule endoscopy today    *Pancytopenia   likely multifactorial   Hb drop likely from possible additional GI bleed  anastrozole likely a contributing factor   Hematology f/u

## 2018-05-24 NOTE — PROGRESS NOTE ADULT - ASSESSMENT
# Working diagnosis: symptomatic anemia  Patient was found to be Pancytopenic.  pt has elevated LDH, high retic count, positve warm antibodies, r/o anastrazole-induced AIHA, atbds sent to outiside lab, f.u results  -has history of lower GI bleeding few months ago, and was found to have positive guaiac in ED, no active bleed now  - gastritis on EGD  - s/p 3 transfusion since admission  - started on BID protonix, f/u Bx with GI    # Cough and exertional shortness of breath  - resolved SOB, cough improved  - Patient might have non diagnosed COPD (chronic smoking 50 pack year, stopped only 4 months ago)  - possible acute bronchitis / viral upper resp tract infection  - symptomatic ttt    # History of buttock skin lesion s/p excision during hemorrhoidectomy surgery  Pathology showing malignancy of undetermined origin, clean margins of excision  Needs to follow up with oncology and address this issue too    # Breast cancer s/p lumpectomy and currently on anastrozole  - c/w anastrazole  - hemonc f/u: CT A/P with PO/IV c/s metastatic workup    # DVT proph (encourage ambulation, sequentials)   GI prophylaxis  Regular diet  ambulate as tolerated  Full code  dispo home # Working diagnosis: symptomatic anemia  Patient was found to be Pancytopenic.  pt has elevated LDH, high retic count, positve warm antibodies, r/o anastrazole-induced AIHA, atbds sent to outiside lab, f.u results  -has history of lower GI bleeding few months ago, and was found to have positive guaiac in ED, no active bleed now  - gastritis on EGD  - s/p 3 transfusion since admission  - started on BID protonix, f/u Bx with GI    # Cough and exertional shortness of breath  - resolved SOB, cough improved  - Patient might have non diagnosed COPD (chronic smoking 50 pack year, stopped only 4 months ago)  - possible acute bronchitis / viral upper resp tract infection  - symptomatic ttt    # History of buttock skin lesion s/p excision during hemorrhoidectomy surgery  Pathology showing malignancy of undetermined origin, clean margins of excision  Needs to follow up with oncology and address this issue too    # Breast cancer s/p lumpectomy and currently on anastrozole  - c/w anastrazole  - hemonc f/u: CT A/P with PO/IV c/s metastatic workup  - I collected tubes for flow cytometry and sent them to lab , f./u for workup for possible malignancy    # DVT proph (encourage ambulation, sequentials)   GI prophylaxis  Regular diet  ambulate as tolerated  Full code  dispo home

## 2018-05-24 NOTE — PROGRESS NOTE ADULT - SUBJECTIVE AND OBJECTIVE BOX
HPI:  68 yo female patient with PMHx of right breast cancer (Well to moderately differentiated invasive ductal carcinoma ER+, VA+, Her-2 neg, stage T1c, N0, M0) s/p lumpectomy around 1.5 years ago, followed by radiation therapy and currently maintained on anastrazole, history of lower GI bleeding back in January 2018 which was attributed to hemorrhoids, s/p hemorrhoidectomy and fissurotomy, currently presented because of worsening generalized weakness, dyspnea on exertion and cough.  Patient in fact has multiple complaints:  1- She complains of few days of productive cough, clear phlegm, associated with left armpit pain, radiating down her left side, worse with coughing and with deep inspiration. Patient is a former smoker of around 50 pack year, stopped 4 months ago. Denies sore throat, rhinorrhea, fever...  2- Patient is also having generalized weakness and exertional shortness of breath, getting worse over the last month or so.  3- Patient is complaining of intermittent headaches recently    In ED, patient was found to have severe anemia (Hg 5.8) and positive guaiac, admitted for evaluation.   Of note, patient had a screening colonoscopy done in December 2017 which was complicated by bleeding and hemorrhoidectomy. 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 She also notes a 25 lb weight loss in the past 2 months without any decrease in appetite.    Oncologic History:    Patient had a screening bilateral mammogram on 10/27/16 that showed an irregularly-shaped spiculated hypoechoic mass in the right breast at the 3 o'clock axis in the periareolar region measuring 1.6 cm in greatest dimension and two additional masses in the right breast, one in the 1 o' clock axis and one in the 3 o' clock axis. Ultrasound-guided core biopsies were done. Biopsy of large spiculated mass showed well to moderately differentiated invasive ductal carcinoma with no intraductal component, ER positive, VA positive, and HER-2/britni negative. Biopsy of other two lesions revealed radial scars on both. There was no clinical abnormal lymphadenopathy. 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   The patient saw Dr. Oakley for surgical consultation. On 2/7/17, she underwent right breast needle-localized lumpectomy x 2, left breast needle-localized lumpectomy, and right axillary sentinel lymph node biopsy. The pathology showed 1.4 cm invasive moderately differentiated duct carcinoma with focal micropapillary features and associated calcifications and duct carcinoma in situ. No LVI or PNI was seen. The invasive tumor was ER positive 100%, VA positive 85%, Her2 negative (2+ by IHC and negative by TIMOTHY). Ki-67% was less than 10%. The surgical margins were negative. Two right axillary sentinel lymph nodes were negative for malignancy. The AJCC 7th edition pathologic stage is IA, [id number]. Lumpectomy of the second area of the right breast and left breast did not reveal evidence of malignancy.  Her Oncotype DX recurrence score is 6 corresponding to a 5% 10-year risk of distant recurrence with adjuvant hormonal therapy.  She saw Dr. Jackson and finished adjuvant breast radiotherapy. Patient is currently on  Anastrozole therapy.     Pt seen and examined at bedside. No fresh complaints.  Vital Signs Last 24 Hrs  T(C): 36.1 (24 May 2018 13:06), Max: 36.3 (24 May 2018 05:58)  T(F): 96.9 (24 May 2018 13:06), Max: 97.3 (24 May 2018 05:58)  HR: 76 (24 May 2018 13:06) (76 - 89)  BP: 136/65 (24 May 2018 13:06) (136/65 - 141/69)  BP(mean): --  RR: 20 (24 May 2018 13:06) (16 - 20)  SpO2: --    Labs:                          9.4<L>  3.21<L> )-----------( 101<L>    ( 05-24 @ 08:19 )             28.3<L>               8.2<L>  3.20<L> )-----------( 105<L>    ( 05-23 @ 06:29 )             25.0<L>               7.6<L>  3.91<L> )-----------( 110<L>    ( 05-22 @ 22:09 )             23.7<L>               8.0<L>  3.38<L> )-----------( 115<L>    ( 05-22 @ 17:18 )             23.4<L>               5.2<LL>  3.66<L> )-----------( 128<L>    ( 05-21 @ 22:53 )             16.1<L>        142  |  103  |  9<L>  ----------------------------<  121<H>  4.4   |  26  |  0.6<L>    Ca    9.2      24 May 2018 08:19  Mg     2.2     05-24    Direct Hung Profile (05.23.18 @ 08:06)    Dir Antiglob Polyspecific Interpretation: POS    Direct Hung C3 (05.23.18 @ 08:06)    Dir Antiglob Complement Interpretation: NEG    Direct Hung IgG (05.23.18 @ 08:06)    Dir Antiglob IgG Interpretation: POS    Lactate Dehydrogenase, Serum in AM (05.23.18 @ 06:29)    Lactate Dehydrogenase, Serum: 560    Haptoglobin, Serum (05.22.18 @ 17:02)    Haptoglobin, Serum: 158 mg/dL    Reticulocyte Count (05.21.18 @ 22:53)    RBC Count: 1.74 M/uL    Reticulocyte Percent: 6.0 %    Absolute Reticulocytes: 104.6 K/uL    Protein Electrophoresis, Serum (05.21.18 @ 22:53)    Protein Total, Serum: 7.7 g/dL    Total Protein, Serum: 7.7 g/dL    Albumin, Serum: 3.4 g/dL    Alpha 1: 0.6 g/dL    Alpha 2: 0.7 g/dL    Beta Globulin: 1.6 g/dL    Gamma Globulin: 1.4 g/dL    M-Carlos: See Note g/dL    % Albumin: 44.1 %    % Alpha 1: 7.8 %    % Alpha 2: 9.6 %    % Beta: 20.3 %    % Gamma: 18.2 %    % M Carlos: See Note: M-Carlos 1 = 10.6 % = 0.8 g/dl = Beta Region  M-Carlos 2 = 4.1  %  = 0.3 g/dl = Gamma Region %    Albumin/Globulin Ratio: 0.8 Ratio    Serum Protein Electrophoresis Interp: Identified    Immunofixation, Serum (05.21.18 @ 22:53)    Immunofixation, Serum:    Two IgG Kappa Bands  Identified    Reference Range: None Detected        < from: CT Abdomen and Pelvis w/ Oral Cont and w/ IV Cont (05.24.18 @ 12:26) >  IMPRESSION:   1.  Since January 27, 2018, new splenomegaly, multiple ill-defined   hypodense renal lesions and scattered borderline prominent abdominal   lymph nodes. Constellation of findings are suspicious for lymphoma;   metastatic disease is also a possibility. Tissue sampling is recommended   for definitive diagnosis.     2.  Unchanged incompletely characterized 0.8 cm hypodense pancreatic   lesion. This can be further evaluated with a pancreas protocol MRI.    < end of copied text >    < from: CT Angio Chest w/ IV Cont (05.21.18 @ 13:56) >    IMPRESSION:    1.  No pulmonary embolus.   2.  Multiple bilateral subcentimeter axillary lymph nodes and prominent   left axillary lymph node measuring up to 1.2 cm in short axis,   nonspecific. Postsurgical clips along the right axilla and breast.    < end of copied text > no

## 2018-05-24 NOTE — PROGRESS NOTE ADULT - ASSESSMENT
Lymphadenopathy with pancytopenia: ?Lymphoma. Lymphadenopathy especially in axilla could also be explained by possible breast Ca recurrence.   --Surgery evaluation for axillary LN biopsy  --Check flow cytometry, FLC ratio and immunoglobulin levels    Macrocytic Anemia: Multifactorial (GI bleeding + ?anemia of chronic disease + mild AIHA)  -- B12 and folate WNL.   --Check MMA, flow cytometry, FLC, Ig levels    DVT/GI ppx: Sequentials only

## 2018-05-24 NOTE — PROGRESS NOTE ADULT - SUBJECTIVE AND OBJECTIVE BOX
Patient is a 67y old  Female who presents with a chief complaint of cough, dyspnea on exertion, generalized weakness (21 May 2018 16:39)      Ovenight events: Patient lost IV access so CT scan not done., will get today    PAST MEDICAL & SURGICAL HISTORY:  Gastrointestinal bleeding, lower: In january 2018  Breast CA  History of total left hip replacement  Status post right breast lumpectomy        FAMILY HISTORY:  No pertinent family history in first degree relatives        Allergies    No Known Allergies    Intolerances        acetaminophen   Tablet. 650 milliGRAM(s) Oral every 6 hours PRN  ALBUTerol    0.083% 2.5 milliGRAM(s) Nebulizer every 6 hours  anastrozole 1 milliGRAM(s) Oral daily  buDESOnide 160 MICROgram(s)/formoterol 4.5 MICROgram(s) Inhaler 2 Puff(s) Inhalation two times a day  melatonin 5 milliGRAM(s) Oral once PRN  nicotine -  14 mG/24Hr(s) Patch 1 patch Transdermal daily  oxyCODONE    5 mG/acetaminophen 325 mG 1 Tablet(s) Oral every 6 hours PRN  pantoprazole    Tablet 40 milliGRAM(s) Oral two times a day  : Home Meds:      PHYSICAL EXAM    ICU Vital Signs Last 24 Hrs  T(C): 36.3 (24 May 2018 05:58), Max: 36.3 (23 May 2018 13:44)  T(F): 97.3 (24 May 2018 05:58), Max: 97.4 (23 May 2018 13:44)  HR: 83 (24 May 2018 05:58) (72 - 89)  BP: 141/69 (24 May 2018 05:58) (111/67 - 141/69)  BP(mean): --  ABP: --  ABP(mean): --  RR: 18 (24 May 2018 05:58) (16 - 20)  SpO2: 98% (23 May 2018 13:30) (98% - 99%)      General: NAD  HEENT: wnl  Lymph nodes: wnl  Lungs: CTA b/l , mild inspiratory rales  Cardiovascular: rs1s2 no murmur  Abdomen: soft non tender not distended  Extremities: +PP no LLE  Skin: wnl  Neurological: AAO3 no focal deficit        LABS:                          9.4    3.21  )-----------( 101      ( 24 May 2018 08:19 )             28.3                                               05-24    142  |  103  |  9<L>  ----------------------------<  121<H>  4.4   |  26  |  0.6<L>    Ca    9.2      24 May 2018 08:19  Mg     2.2     05-24    TPro  7.3  /  Alb  3.6  /  TBili  1.1  /  DBili  x   /  AST  47<H>  /  ALT  39  /  AlkPhos  315<H>  05-22                                                                                           LIVER FUNCTIONS - ( 22 May 2018 17:18 )  Alb: 3.6 g/dL / Pro: 7.3 g/dL / ALK PHOS: 315 U/L / ALT: 39 U/L / AST: 47 U/L / GGT: x                                                                                                                                       X-Rays:                                                                                         ECHO:    MEDICATIONS  (STANDING):  ALBUTerol    0.083% 2.5 milliGRAM(s) Nebulizer every 6 hours  anastrozole 1 milliGRAM(s) Oral daily  buDESOnide 160 MICROgram(s)/formoterol 4.5 MICROgram(s) Inhaler 2 Puff(s) Inhalation two times a day  nicotine -  14 mG/24Hr(s) Patch 1 patch Transdermal daily  pantoprazole    Tablet 40 milliGRAM(s) Oral two times a day    MEDICATIONS  (PRN):  acetaminophen   Tablet. 650 milliGRAM(s) Oral every 6 hours PRN Mild Pain (1 - 3)  melatonin 5 milliGRAM(s) Oral once PRN Insomnia  oxyCODONE    5 mG/acetaminophen 325 mG 1 Tablet(s) Oral every 6 hours PRN Moderate Pain (4 - 6)

## 2018-05-25 LAB
ANION GAP SERPL CALC-SCNC: 14 MMOL/L — SIGNIFICANT CHANGE UP (ref 7–14)
BASOPHILS # BLD AUTO: 0.02 K/UL — SIGNIFICANT CHANGE UP (ref 0–0.2)
BASOPHILS NFR BLD AUTO: 0.6 % — SIGNIFICANT CHANGE UP (ref 0–1)
BUN SERPL-MCNC: 10 MG/DL — SIGNIFICANT CHANGE UP (ref 10–20)
CALCIUM SERPL-MCNC: 9.1 MG/DL — SIGNIFICANT CHANGE UP (ref 8.5–10.1)
CHLORIDE SERPL-SCNC: 100 MMOL/L — SIGNIFICANT CHANGE UP (ref 98–110)
CO2 SERPL-SCNC: 28 MMOL/L — SIGNIFICANT CHANGE UP (ref 17–32)
CREAT SERPL-MCNC: 0.6 MG/DL — LOW (ref 0.7–1.5)
EOSINOPHIL # BLD AUTO: 0 K/UL — SIGNIFICANT CHANGE UP (ref 0–0.7)
EOSINOPHIL NFR BLD AUTO: 0 % — SIGNIFICANT CHANGE UP (ref 0–8)
GLUCOSE SERPL-MCNC: 122 MG/DL — HIGH (ref 70–99)
HCT VFR BLD CALC: 28 % — LOW (ref 37–47)
HGB BLD-MCNC: 9.1 G/DL — LOW (ref 12–16)
IMM GRANULOCYTES NFR BLD AUTO: 9.9 % — HIGH (ref 0.1–0.3)
LDH SERPL L TO P-CCNC: 584 — HIGH (ref 50–242)
LYMPHOCYTES # BLD AUTO: 1.03 K/UL — LOW (ref 1.2–3.4)
LYMPHOCYTES # BLD AUTO: 32 % — SIGNIFICANT CHANGE UP (ref 20.5–51.1)
MAGNESIUM SERPL-MCNC: 2.2 MG/DL — SIGNIFICANT CHANGE UP (ref 1.8–2.4)
MCHC RBC-ENTMCNC: 28.3 PG — SIGNIFICANT CHANGE UP (ref 27–31)
MCHC RBC-ENTMCNC: 32.5 G/DL — SIGNIFICANT CHANGE UP (ref 32–37)
MCV RBC AUTO: 87 FL — SIGNIFICANT CHANGE UP (ref 81–99)
METHYLMALONATE SERPL-SCNC: 340 NMOL/L — SIGNIFICANT CHANGE UP (ref 0–378)
MONOCYTES # BLD AUTO: 0.11 K/UL — SIGNIFICANT CHANGE UP (ref 0.1–0.6)
MONOCYTES NFR BLD AUTO: 3.4 % — SIGNIFICANT CHANGE UP (ref 1.7–9.3)
NEUTROPHILS # BLD AUTO: 1.74 K/UL — SIGNIFICANT CHANGE UP (ref 1.4–6.5)
NEUTROPHILS NFR BLD AUTO: 54.1 % — SIGNIFICANT CHANGE UP (ref 42.2–75.2)
PLATELET # BLD AUTO: 120 K/UL — LOW (ref 130–400)
POTASSIUM SERPL-MCNC: 4.3 MMOL/L — SIGNIFICANT CHANGE UP (ref 3.5–5)
POTASSIUM SERPL-SCNC: 4.3 MMOL/L — SIGNIFICANT CHANGE UP (ref 3.5–5)
RBC # BLD: 3.22 M/UL — LOW (ref 4.2–5.4)
RBC # FLD: 19.3 % — HIGH (ref 11.5–14.5)
SODIUM SERPL-SCNC: 142 MMOL/L — SIGNIFICANT CHANGE UP (ref 135–146)
SURGICAL PATHOLOGY STUDY: SIGNIFICANT CHANGE UP
WBC # BLD: 3.22 K/UL — LOW (ref 4.8–10.8)
WBC # FLD AUTO: 3.22 K/UL — LOW (ref 4.8–10.8)

## 2018-05-25 PROCEDURE — 99223 1ST HOSP IP/OBS HIGH 75: CPT

## 2018-05-25 RX ORDER — ACETAMINOPHEN 500 MG
650 TABLET ORAL EVERY 6 HOURS
Qty: 0 | Refills: 0 | Status: DISCONTINUED | OUTPATIENT
Start: 2018-05-25 | End: 2018-06-07

## 2018-05-25 RX ADMIN — Medication 650 MILLIGRAM(S): at 14:16

## 2018-05-25 RX ADMIN — PANTOPRAZOLE SODIUM 40 MILLIGRAM(S): 20 TABLET, DELAYED RELEASE ORAL at 17:06

## 2018-05-25 RX ADMIN — ALBUTEROL 2.5 MILLIGRAM(S): 90 AEROSOL, METERED ORAL at 13:58

## 2018-05-25 RX ADMIN — ALBUTEROL 2.5 MILLIGRAM(S): 90 AEROSOL, METERED ORAL at 19:54

## 2018-05-25 RX ADMIN — ALBUTEROL 2.5 MILLIGRAM(S): 90 AEROSOL, METERED ORAL at 07:59

## 2018-05-25 RX ADMIN — Medication 10 MILLIGRAM(S): at 00:00

## 2018-05-25 RX ADMIN — BUDESONIDE AND FORMOTEROL FUMARATE DIHYDRATE 2 PUFF(S): 160; 4.5 AEROSOL RESPIRATORY (INHALATION) at 21:47

## 2018-05-25 RX ADMIN — Medication 650 MILLIGRAM(S): at 13:46

## 2018-05-25 RX ADMIN — ANASTROZOLE 1 MILLIGRAM(S): 1 TABLET ORAL at 11:14

## 2018-05-25 NOTE — PROGRESS NOTE ADULT - ASSESSMENT
68 yo female patient with PMHx of right breast cancer (Well to moderately differentiated invasive ductal carcinoma ER+, MO+, Her-2 neg, stage T1c, N0, M0) s/p lumpectomy around 1.5 years ago, followed by radiation therapy and currently maintained on anastrazole, history of life-threatening lower GI bleeding 2/2 hemorrhoids s/p hemorrhoidectomy and fissurotomy by Dr Ocampo s/p reinforcement of suture lines in 1/2018 presenting with generalized weakness, dyspnea on exertion and dark stools s/p NSAIDs intake.    *Dark stools / fatigue / + NSAIDs use   likely 2/2 NSAIDs  no evidence of active GI bleeding   ALEKSANDRA empty vault   EGD 5/23 showing erosive gastritis and esophagitis   s/p 2 u of PRBCs  corrected appropriately    -cbc q12h   -PPI BID  -avoid NSAIDs   -f/u biopsies results   -capsule endoscopy held since the CT scan showed suspicion of lymphoma so hemonc work up is currently more imminent for the patient especially that she does not have any signs of active GI bleed.     *Pancytopenia   likely multifactorial   anastrozole likely a contributing factor   new suspicion of lymphoma on CT scan   Hematology f/u 66 yo female patient with PMHx of right breast cancer (Well to moderately differentiated invasive ductal carcinoma ER+, RI+, Her-2 neg, stage T1c, N0, M0) s/p lumpectomy around 1.5 years ago, followed by radiation therapy and currently maintained on anastrazole, history of life-threatening lower GI bleeding 2/2 hemorrhoids s/p hemorrhoidectomy and fissurotomy by Dr Ocampo s/p reinforcement of suture lines in 1/2018 presenting with generalized weakness, dyspnea on exertion and dark stools s/p NSAIDs intake.    *Dark stools / fatigue / + NSAIDs use   likely 2/2 NSAIDs  no evidence of active GI bleeding   ALEKSANDRA empty vault   EGD 5/23 showing erosive gastritis and esophagitis   s/p 2 u of PRBCs  corrected appropriately    -cbc q12h   -PPI qd  -avoid NSAIDs   -f/u biopsies results   -capsule endoscopy held since the CT scan showed suspicion of lymphoma so hemonc work up is currently more imminent for the patient especially that she does not have any signs of active GI bleed.     *Pancytopenia   likely multifactorial   anastrozole likely a contributing factor   new suspicion of lymphoma on CT scan   Hematology f/u

## 2018-05-25 NOTE — CONSULT NOTE ADULT - ASSESSMENT
Pt is a 69y/o F complaining of left sided pain from her armpit extending to her hip along the axillary line, worsened w/ cough and laying on her left side. Pt has hx of R breast cancer, treated w/ lumpectomy, radiation, and currently on Anastrazole. CT scans done on admission show prominent L axillary lymph node, as well as multiple scattered lesions and lymphadenopathy.    PLAN: Pt is a 69y/o F complaining of left sided pain from her armpit extending to her hip along the axillary line, worsened w/ cough and laying on her left side. Pt has hx of R breast cancer, treated w/ lumpectomy, radiation, and currently on Anastrazole. CT scans done on admission show prominent L axillary lymph node, as well as multiple scattered lesions and lymphadenopathy.    PLAN:    Senior resident on call note : patient was seen and examined. Discussed the case with  and he agrees with the plan:  agree above plan:  -scheduled for LN biopsy on wed .   - pre op labs on Tuesday including type and screen .

## 2018-05-25 NOTE — PROGRESS NOTE ADULT - ASSESSMENT
# Working diagnosis: symptomatic anemia with pancytopenia  Patient was found to be Pancytopenic.  pt has elevated LDH, high retic count, positve warm antibodies, r/o anastrazole-induced AIHA, atbds sent to outiside lab, f.u results  -has history of lower GI bleeding few months ago, and was found to have positive guaiac in ED, no active bleed now  - gastritis on EGD  - s/p 3 transfusion since admission  - started on BID protonix, f/u Bx with GI  - pancytopenia w/u: LNs in axilla and abdomen suspicious for lymphoma, will need bx by surgery ( Dr Isaac will perform it on Wednesday 5/30)  - f/u flow cytometry result    # Cough and exertional shortness of breath  - resolved SOB, cough improved  - Patient might have non diagnosed COPD (chronic smoking 50 pack year, stopped only 4 months ago)  - possible acute bronchitis / viral upper resp tract infection  - symptomatic ttt    # History of buttock skin lesion s/p excision during hemorrhoidectomy surgery  Pathology showing malignancy of undetermined origin, clean margins of excision  Needs to follow up with oncology and address this issue too    # Breast cancer s/p lumpectomy and currently on anastrozole  - c/w anastrazole      # DVT proph (encourage ambulation, sequentials)   GI prophylaxis  Regular diet  ambulate as tolerated  Full code  dispo home

## 2018-05-25 NOTE — PROGRESS NOTE ADULT - SUBJECTIVE AND OBJECTIVE BOX
INTERVAL HPI/OVERNIGHT EVENTS:    68 yo female patient with PMHx of right breast cancer (Well to moderately differentiated invasive ductal carcinoma ER+, [place]+, Her-2 neg, stage T1c, N0, M0) s/p lumpectomy around 1.5 years ago, followed by radiation therapy and currently maintained on anastrazole, history of lower GI bleeding back in January 2018 which was attributed to hemorrhoids s/p hemorrhoidectomy and fissurotomy, currently presented because of worsening generalized weakness and dyspnea on exertion.     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 Patient was d/c uneventfully then presented again after few weeks for BRBPR and significant drop in hb and Colonoscopy 1/2018 for BRBPR by Dr sanchez: poor prep, internal hemorrhoids, skin lesion noted at the buttock, diverticulosis. No blood was seen in colon or TI in all colonoscopies so bleeding was attributed to hemorrhoids s/p hemorrhoidectomy on 1/11/18 by Dr Ocampo with excision of 2 perianal lesions and fissurotomy. Around 2 weeks after, the patient presented again with significant drop in hb and BRBPR and Dr ocampo perfomed a reinforcement of hemorrhoidectomy suture line and rigid sigmoidoscopy.      Colonoscopy 1/2018 for BRBPR by Dr sanchez: poor prep, internal hemorrhoids, skin lesion noted at the buttock, diverticulosis.  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 In ED, patient was found to have severe anemia (Hg 5.8) and positive guaiac, admitted for evaluation. Baseline hb 12-13 in 2017.     FH non significant   usually has 1 normal bm per day, no weight loss     5/23 EGD showed no obvious source of bleeding  5/24 patient could not receive the capsule endoscopy since she had oral contrast for CT scan being done for investigation for malignancy (hx of breat ca, new LN detected on recent CT). No bms overnight, no other complaints.   5/25 no bms, ct scan suspicious for lymphoma     ROS wnl except as described above     PAST MEDICAL & SURGICAL HISTORY:  Gastrointestinal bleeding, lower: In january 2018  Breast CA  History of total left hip replacement  Status post right breast lumpectomy      Home Medications:  anastrozole 1 mg oral tablet: 1 tab(s) orally once a day (21 May 2018 16:50)      MEDICATIONS  (STANDING):  ALBUTerol    0.083% 2.5 milliGRAM(s) Nebulizer every 6 hours  anastrozole 1 milliGRAM(s) Oral daily  buDESOnide 160 MICROgram(s)/formoterol 4.5 MICROgram(s) Inhaler 2 Puff(s) Inhalation two times a day  nicotine -  14 mG/24Hr(s) Patch 1 patch Transdermal daily  pantoprazole    Tablet 40 milliGRAM(s) Oral two times a day    MEDICATIONS  (PRN):  acetaminophen   Tablet 650 milliGRAM(s) Oral every 6 hours PRN For Temp greater than 38 C (100.4 F)  melatonin 5 milliGRAM(s) Oral once PRN Insomnia  oxyCODONE    5 mG/acetaminophen 325 mG 1 Tablet(s) Oral every 6 hours PRN Moderate Pain (4 - 6)      Allergies    No Known Allergies    Intolerances            PHYSICAL EXAM:   Vital Signs:  Vital Signs Last 24 Hrs  T(C): 37.1 (25 May 2018 17:25), Max: 38.1 (25 May 2018 13:43)  T(F): 98.7 (25 May 2018 17:25), Max: 100.6 (25 May 2018 13:43)  HR: 79 (25 May 2018 13:43) (76 - 81)  BP: 104/58 (25 May 2018 13:43) (104/58 - 134/66)  BP(mean): --  RR: 18 (25 May 2018 13:43) (16 - 18)  SpO2: --  Daily Height in cm: 170.18 (25 May 2018 01:43)    Daily     GENERAL:  no distress  SKIN: intact  HEENT:  NC/AT,  anicteric  CHEST:   no increased effort, breath sounds clear  HEART:  Regular rhythm  ABDOMEN:  Soft, non-tender, non-distended, normoactive bowel sounds,  no masses ,no hepato-splenomegaly, no signs of chronic liver disease  EXTEREMITIES:  no cyanosis      LABS:                        9.1    3.22  )-----------( 120      ( 25 May 2018 08:33 )             28.0       Hemoglobin: 9.1 g/dL (05-25-18 @ 08:33)  Hemoglobin: 9.4 g/dL (05-24-18 @ 08:19)  Hemoglobin: 8.2 g/dL (05-23-18 @ 06:29)  Hemoglobin: 7.6 g/dL (05-22-18 @ 22:09)      05-25    142  |  100  |  10  ----------------------------<  122<H>  4.3   |  28  |  0.6<L>    Ca    9.1      25 May 2018 08:33  Mg     2.2     05-25                      RADIOLOGY & ADDITIONAL TESTS: INTERVAL HPI/OVERNIGHT EVENTS:    68 yo female patient with PMHx of right breast cancer (Well to moderately differentiated invasive ductal carcinoma ER+, [place]+, Her-2 neg, stage T1c, N0, M0) s/p lumpectomy around 1.5 years ago, followed by radiation therapy and currently maintained on anastrazole, history of lower GI bleeding back in January 2018 which was attributed to hemorrhoids s/p hemorrhoidectomy and fissurotomy, currently presented because of worsening generalized weakness and dyspnea on exertion.     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 Patient was d/c uneventfully then presented again after few weeks for BRBPR and significant drop in hb and Colonoscopy 1/2018 for BRBPR by Dr sanchez: poor prep, internal hemorrhoids, skin lesion noted at the buttock, diverticulosis. No blood was seen in colon or TI in all colonoscopies so bleeding was attributed to hemorrhoids s/p hemorrhoidectomy on 1/11/18 by Dr Ocampo with excision of 2 perianal lesions and fissurotomy. Around 2 weeks after, the patient presented again with significant drop in hb and BRBPR and Dr ocampo perfomed a reinforcement of hemorrhoidectomy suture line and rigid sigmoidoscopy.      Colonoscopy 1/2018 for BRBPR by Dr sanchez: poor prep, internal hemorrhoids, skin lesion noted at the buttock, diverticulosis.  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 In ED, patient was found to have severe anemia (Hg 5.8) and positive guaiac, admitted for evaluation. Baseline hb 12-13 in 2017.     FH non significant   usually has 1 normal bm per day, no weight loss     5/23 EGD showed no obvious source of bleeding  5/24 patient could not receive the capsule endoscopy since she had oral contrast for CT scan being done for investigation for malignancy (hx of breat ca, new LN detected on recent CT). No bms overnight, no other complaints.   5/25 no bms, ct scan suspicious for lymphoma. Since this can explain her lymphoma the capsule endoscopy was cancelled    ROS wnl except as described above     PAST MEDICAL & SURGICAL HISTORY:  Gastrointestinal bleeding, lower: In january 2018  Breast CA  History of total left hip replacement  Status post right breast lumpectomy      Home Medications:  anastrozole 1 mg oral tablet: 1 tab(s) orally once a day (21 May 2018 16:50)      MEDICATIONS  (STANDING):  ALBUTerol    0.083% 2.5 milliGRAM(s) Nebulizer every 6 hours  anastrozole 1 milliGRAM(s) Oral daily  buDESOnide 160 MICROgram(s)/formoterol 4.5 MICROgram(s) Inhaler 2 Puff(s) Inhalation two times a day  nicotine -  14 mG/24Hr(s) Patch 1 patch Transdermal daily  pantoprazole    Tablet 40 milliGRAM(s) Oral two times a day    MEDICATIONS  (PRN):  acetaminophen   Tablet 650 milliGRAM(s) Oral every 6 hours PRN For Temp greater than 38 C (100.4 F)  melatonin 5 milliGRAM(s) Oral once PRN Insomnia  oxyCODONE    5 mG/acetaminophen 325 mG 1 Tablet(s) Oral every 6 hours PRN Moderate Pain (4 - 6)      Allergies    No Known Allergies    PHYSICAL EXAM:   Vital Signs:  Vital Signs Last 24 Hrs  T(C): 37.1 (25 May 2018 17:25), Max: 38.1 (25 May 2018 13:43)  T(F): 98.7 (25 May 2018 17:25), Max: 100.6 (25 May 2018 13:43)  HR: 79 (25 May 2018 13:43) (76 - 81)  BP: 104/58 (25 May 2018 13:43) (104/58 - 134/66)  BP(mean): --  RR: 18 (25 May 2018 13:43) (16 - 18)  SpO2: --  Daily Height in cm: 170.18 (25 May 2018 01:43)    Daily     GENERAL:  no distress  SKIN: intact  HEENT:  NC/AT,  anicteric  CHEST:   no increased effort, breath sounds clear  HEART:  Regular rhythm  ABDOMEN:  Soft, non-tender, non-distended, normoactive bowel sounds,  no masses ,no hepato-splenomegaly, no signs of chronic liver disease  EXTEREMITIES:  no cyanosis      LABS:                        9.1    3.22  )-----------( 120      ( 25 May 2018 08:33 )             28.0       Hemoglobin: 9.1 g/dL (05-25-18 @ 08:33)  Hemoglobin: 9.4 g/dL (05-24-18 @ 08:19)  Hemoglobin: 8.2 g/dL (05-23-18 @ 06:29)  Hemoglobin: 7.6 g/dL (05-22-18 @ 22:09)      05-25    142  |  100  |  10  ----------------------------<  122<H>  4.3   |  28  |  0.6<L>    Ca    9.1      25 May 2018 08:33  Mg     2.2     05-25

## 2018-05-25 NOTE — CONSULT NOTE ADULT - ATTENDING COMMENTS
pt with defuse lymphadenopathy.  Will schedule for axillary LN biopsy , likely Wednesday  If discharged before - please call office [phone] or contact us as soon as possible so we can coordinate.
Patient was seen and examined. She denies overt bleeding over the course of the past 2-3 months. She admits to unintentional weight loss and night sweats.   The rectal mass removed on 12/17 after extensive review was deemed to be benign, however in the setting of current symptoms, recommend obtaining CT of the abdomen and pelvis.  Please obtain hemolysis panel, including Hung test.   Agree with EGD, she may require repeat colonoscopy as well.  Will follow.

## 2018-05-25 NOTE — CONSULT NOTE ADULT - SUBJECTIVE AND OBJECTIVE BOX
ROSE JOY [id number]  67y Female    HPI:  Pt is a 66 y/o female, with PMH of right breast cancer & is s/p lumpectomy around 1.5 years ago, followed by radiation therapy and currently maintained on anastrazole, with other PMH listed below, complaining of L sided pain extending from armpit to hip along the axillary line x 5 days. Pt states the pain is exacerbated when she coughs, which she's been doing x 1 month (productive cough which she attributes to smoking cessation approx 6 months ago) and when laying on her left side. Denies tenderness to palpation. Denies hemoptysis, CP, SOB, fever/chills, n/v/d, abd pain. Pt is currently being tx'ed with a nebulizer, which she says is helping her cough. Left-sided pain has mildly subsided since being admitted to the hospital.      PAST MEDICAL & SURGICAL HISTORY:  Gastrointestinal bleeding, lower (hemorrhoids): 1/2018  Breast CA    PSH:  Total left hip replacement  Right breast lumpectomy 2017  Hysterectomy 2010      HOME MEDICATIONS:  Anastrazole 1mg      MEDICATIONS  (STANDING):  ALBUTerol    0.083% 2.5 milliGRAM(s) Nebulizer every 6 hours  anastrozole 1 milliGRAM(s) Oral daily  buDESOnide 160 MICROgram(s)/formoterol 4.5 MICROgram(s) Inhaler 2 Puff(s) Inhalation two times a day  nicotine -  14 mG/24Hr(s) Patch 1 patch Transdermal daily  pantoprazole    Tablet 40 milliGRAM(s) Oral two times a day    MEDICATIONS  (PRN):  acetaminophen   Tablet. 650 milliGRAM(s) Oral every 6 hours PRN Mild Pain (1 - 3)  melatonin 5 milliGRAM(s) Oral once PRN Insomnia  oxyCODONE    5 mG/acetaminophen 325 mG 1 Tablet(s) Oral every 6 hours PRN Moderate Pain (4 - 6)      ALLERGIES    No Known Allergies        REVIEW OF SYSTEMS    A ten-point review of systems was otherwise negative except as noted.      Vital Signs Last 24 Hrs  T(C): 36.6 (25 May 2018 05:44), Max: 36.8 (24 May 2018 21:29)  T(F): 97.9 (25 May 2018 05:44), Max: 98.3 (24 May 2018 21:29)  HR: 76 (25 May 2018 05:44) (76 - 81)  BP: 134/66 (25 May 2018 05:44) (113/56 - 136/65)  BP(mean): --  RR: 18 (25 May 2018 05:44) (16 - 20)  SpO2: --    PHYSICAL EXAM:  GENERAL: NAD, well-appearing  CHEST/LUNG: CTAB, no wheeze, no rales  HEART: Regular rate and rhythm, no murmurs  ABDOMEN: Soft, Nontender, Nondistended; +bowel sounds  EXTREMITIES:  No clubbing, cyanosis, or edema      LABS:  Labs:  CAPILLARY BLOOD GLUCOSE                              9.1    3.22  )-----------( 120      ( 25 May 2018 08:33 )             28.0       Auto Immature Granulocyte %: 9.9 % (05-25-18 @ 08:33)  Auto Neutrophil %: 54.1 % (05-25-18 @ 08:33)    05-25    142  |  100  |  10  ----------------------------<  122<H>  4.3   |  28  |  0.6<L>      Calcium, Total Serum: 9.1 mg/dL (05-25-18 @ 08:33)  Magnesium, Serum: 2.2 mg/dL (05-25-18 @ 08:33)      LFTs:         Coags:                RADIOLOGY & ADDITIONAL STUDIES:    < from: CT Abdomen and Pelvis w/ Oral Cont and w/ IV Cont (05.24.18 @ 12:26) >    IMPRESSION:   1.  Since January 27, 2018, new splenomegaly, multiple ill-defined   hypodense renal lesions and scattered borderline prominent abdominal   lymph nodes. Constellation of findings are suspicious for lymphoma;   metastatic disease is also a possibility. Tissue sampling is recommended   for definitive diagnosis.     2.  Unchanged incompletely characterized 0.8 cm hypodense pancreatic   lesion. This can be further evaluated with a pancreas protocol MRI.EXAM:  CT ABDOMEN AND PELVIS OC IC          < end of copied text >      < from: CT Angio Chest w/ IV Cont (05.21.18 @ 13:56) >    IMPRESSION:    1.  No pulmonary embolus.   2.  Multiple bilateral subcentimeter axillary lymph nodes and prominent   left axillary lymph node measuring up to 1.2 cm in short axis,   nonspecific. Postsurgical clips along the right axilla and breast.    < end of copied text >

## 2018-05-25 NOTE — PROGRESS NOTE ADULT - SUBJECTIVE AND OBJECTIVE BOX
Patient is a 67y old  Female who presents with a chief complaint of cough, dyspnea on exertion, generalized weakness (21 May 2018 16:39)      Ovenight events:    PAST MEDICAL & SURGICAL HISTORY:  Gastrointestinal bleeding, lower: In january 2018  Breast CA  History of total left hip replacement  Status post right breast lumpectomy        FAMILY HISTORY:  No pertinent family history in first degree relatives        Allergies    No Known Allergies    Intolerances        acetaminophen   Tablet 650 milliGRAM(s) Oral every 6 hours PRN  ALBUTerol    0.083% 2.5 milliGRAM(s) Nebulizer every 6 hours  anastrozole 1 milliGRAM(s) Oral daily  buDESOnide 160 MICROgram(s)/formoterol 4.5 MICROgram(s) Inhaler 2 Puff(s) Inhalation two times a day  melatonin 5 milliGRAM(s) Oral once PRN  nicotine -  14 mG/24Hr(s) Patch 1 patch Transdermal daily  oxyCODONE    5 mG/acetaminophen 325 mG 1 Tablet(s) Oral every 6 hours PRN  pantoprazole    Tablet 40 milliGRAM(s) Oral two times a day  : Home Meds:      PHYSICAL EXAM    ICU Vital Signs Last 24 Hrs  T(C): 37.1 (25 May 2018 17:25), Max: 38.1 (25 May 2018 13:43)  T(F): 98.7 (25 May 2018 17:25), Max: 100.6 (25 May 2018 13:43)  HR: 79 (25 May 2018 13:43) (76 - 81)  BP: 104/58 (25 May 2018 13:43) (104/58 - 134/66)  BP(mean): --  ABP: --  ABP(mean): --  RR: 18 (25 May 2018 13:43) (16 - 18)  SpO2: --      General: NAD  HEENT: wnl  Lymph nodes: left axillary lymph node  Lungs: CTA BL  Cardiovascular: rs1s2 no murmur  Abdomen: soft non tender, splenomegaly appreciated  Extremities: +PP no LLE  Skin: wnl  Neurological: AAO3        LABS:                          9.1    3.22  )-----------( 120      ( 25 May 2018 08:33 )             28.0                                               05-25    142  |  100  |  10  ----------------------------<  122<H>  4.3   |  28  |  0.6<L>    Ca    9.1      25 May 2018 08:33  Mg     2.2     05-25                                                                                                                                                                                                                           X-Rays:                                                                                         ECHO:    MEDICATIONS  (STANDING):  ALBUTerol    0.083% 2.5 milliGRAM(s) Nebulizer every 6 hours  anastrozole 1 milliGRAM(s) Oral daily  buDESOnide 160 MICROgram(s)/formoterol 4.5 MICROgram(s) Inhaler 2 Puff(s) Inhalation two times a day  nicotine -  14 mG/24Hr(s) Patch 1 patch Transdermal daily  pantoprazole    Tablet 40 milliGRAM(s) Oral two times a day    MEDICATIONS  (PRN):  acetaminophen   Tablet 650 milliGRAM(s) Oral every 6 hours PRN For Temp greater than 38 C (100.4 F)  melatonin 5 milliGRAM(s) Oral once PRN Insomnia  oxyCODONE    5 mG/acetaminophen 325 mG 1 Tablet(s) Oral every 6 hours PRN Moderate Pain (4 - 6)

## 2018-05-26 LAB
ANION GAP SERPL CALC-SCNC: 13 MMOL/L — SIGNIFICANT CHANGE UP (ref 7–14)
BUN SERPL-MCNC: 11 MG/DL — SIGNIFICANT CHANGE UP (ref 10–20)
CALCIUM SERPL-MCNC: 9.4 MG/DL — SIGNIFICANT CHANGE UP (ref 8.5–10.1)
CHLORIDE SERPL-SCNC: 100 MMOL/L — SIGNIFICANT CHANGE UP (ref 98–110)
CO2 SERPL-SCNC: 27 MMOL/L — SIGNIFICANT CHANGE UP (ref 17–32)
CREAT SERPL-MCNC: 0.6 MG/DL — LOW (ref 0.7–1.5)
GLUCOSE SERPL-MCNC: 145 MG/DL — HIGH (ref 70–99)
HCT VFR BLD CALC: 26.6 % — LOW (ref 37–47)
HGB BLD-MCNC: 8.5 G/DL — LOW (ref 12–16)
IGA FLD-MCNC: 195 MG/DL — SIGNIFICANT CHANGE UP (ref 84–499)
IGG FLD-MCNC: 2082 MG/DL — HIGH (ref 610–1660)
IGM SERPL-MCNC: 402 MG/DL — HIGH (ref 35–242)
KAPPA LC SER QL IFE: 8.01 MG/DL — HIGH (ref 0.33–1.94)
KAPPA LC SER QL IFE: 8.01 MG/DL — HIGH (ref 0.33–1.94)
KAPPA/LAMBDA FREE LIGHT CHAIN RATIO, SERUM: 1.93 RATIO — HIGH (ref 0.26–1.65)
KAPPA/LAMBDA FREE LIGHT CHAIN RATIO, SERUM: 1.93 RATIO — HIGH (ref 0.26–1.65)
LAMBDA LC SER QL IFE: 4.16 MG/DL — HIGH (ref 0.57–2.63)
LAMBDA LC SER QL IFE: 4.16 MG/DL — HIGH (ref 0.57–2.63)
MAGNESIUM SERPL-MCNC: 2 MG/DL — SIGNIFICANT CHANGE UP (ref 1.8–2.4)
MCHC RBC-ENTMCNC: 28.1 PG — SIGNIFICANT CHANGE UP (ref 27–31)
MCHC RBC-ENTMCNC: 32 G/DL — SIGNIFICANT CHANGE UP (ref 32–37)
MCV RBC AUTO: 87.8 FL — SIGNIFICANT CHANGE UP (ref 81–99)
NRBC # BLD: 8 /100 WBCS — HIGH (ref 0–0)
PLATELET # BLD AUTO: 100 K/UL — LOW (ref 130–400)
POTASSIUM SERPL-MCNC: 4.4 MMOL/L — SIGNIFICANT CHANGE UP (ref 3.5–5)
POTASSIUM SERPL-SCNC: 4.4 MMOL/L — SIGNIFICANT CHANGE UP (ref 3.5–5)
RBC # BLD: 3.03 M/UL — LOW (ref 4.2–5.4)
RBC # FLD: 19.3 % — HIGH (ref 11.5–14.5)
SODIUM SERPL-SCNC: 140 MMOL/L — SIGNIFICANT CHANGE UP (ref 135–146)
WBC # BLD: 2.81 K/UL — LOW (ref 4.8–10.8)
WBC # FLD AUTO: 2.81 K/UL — LOW (ref 4.8–10.8)

## 2018-05-26 RX ADMIN — PANTOPRAZOLE SODIUM 40 MILLIGRAM(S): 20 TABLET, DELAYED RELEASE ORAL at 17:44

## 2018-05-26 RX ADMIN — ALBUTEROL 2.5 MILLIGRAM(S): 90 AEROSOL, METERED ORAL at 14:02

## 2018-05-26 RX ADMIN — PANTOPRAZOLE SODIUM 40 MILLIGRAM(S): 20 TABLET, DELAYED RELEASE ORAL at 05:54

## 2018-05-26 RX ADMIN — BUDESONIDE AND FORMOTEROL FUMARATE DIHYDRATE 2 PUFF(S): 160; 4.5 AEROSOL RESPIRATORY (INHALATION) at 21:48

## 2018-05-26 RX ADMIN — BUDESONIDE AND FORMOTEROL FUMARATE DIHYDRATE 2 PUFF(S): 160; 4.5 AEROSOL RESPIRATORY (INHALATION) at 09:00

## 2018-05-26 RX ADMIN — ANASTROZOLE 1 MILLIGRAM(S): 1 TABLET ORAL at 11:04

## 2018-05-26 RX ADMIN — ALBUTEROL 2.5 MILLIGRAM(S): 90 AEROSOL, METERED ORAL at 20:35

## 2018-05-26 NOTE — PROGRESS NOTE ADULT - SUBJECTIVE AND OBJECTIVE BOX
S : No new complaints     All other pertinent ROS negative.      Vital Signs Last 24 Hrs  T(C): 36.6 (26 May 2018 13:26), Max: 37.2 (26 May 2018 05:28)  T(F): 97.8 (26 May 2018 13:26), Max: 99 (26 May 2018 05:28)  HR: 83 (26 May 2018 13:26) (75 - 84)  BP: 115/63 (26 May 2018 13:26) (115/63 - 137/64)  BP(mean): --  RR: 20 (26 May 2018 13:26) (18 - 20)  SpO2: 98% (26 May 2018 09:51) (98% - 99%)  PHYSICAL EXAM:    Constitutional: NAD, awake and alert, well-developed  HEENT: PERR, EOMI, Normal Hearing, MMM. Pallor  Neck: Soft and supple, No LAD, No JVD  Respiratory: Breath sounds are clear bilaterally, No wheezing, rales or rhonchi  Cardiovascular: S1 and S2, regular rate and rhythm, no Murmurs, gallops or rubs  Gastrointestinal: Bowel Sounds present, soft, nontender, nondistended, no guarding, no rebound. Splenomegaly.   Extremities: No peripheral edema      MEDICATIONS:  MEDICATIONS  (STANDING):  ALBUTerol    0.083% 2.5 milliGRAM(s) Nebulizer every 6 hours  anastrozole 1 milliGRAM(s) Oral daily  buDESOnide 160 MICROgram(s)/formoterol 4.5 MICROgram(s) Inhaler 2 Puff(s) Inhalation two times a day  nicotine -  14 mG/24Hr(s) Patch 1 patch Transdermal daily  pantoprazole    Tablet 40 milliGRAM(s) Oral two times a day      LABS: All Labs Reviewed:                        8.5    2.81  )-----------( 100      ( 26 May 2018 07:27 )             26.6     05-26    140  |  100  |  11  ----------------------------<  145<H>  4.4   |  27  |  0.6<L>    Ca    9.4      26 May 2018 07:27  Mg     2.0     05-26            Blood Culture:     Radiology: reviewed

## 2018-05-26 NOTE — PROGRESS NOTE ADULT - ASSESSMENT
68 yo female patient with PMHx of right breast cancer (Well to moderately differentiated invasive ductal carcinoma ER+, RI+, Her-2 neg, stage T1c, N0, M0) s/p lumpectomy around 1.5 years ago, followed by radiation therapy and currently maintained on anastrazole, history of lower GI bleeding back in January 2018 which was attributed to hemorrhoids, s/p hemorrhoidectomy and fissurotomy, currently presented because of worsening generalized weakness, dyspnea on exertion and cough was found to have a Hg 5.8.    # Profound symptomatic anemia / Pancytopenia   Hematological/Malignancy related vs AIHA vs less likely GI bleed.   Although Guaiac was initially positive but EGD (5/23) only showed Gastritis.  her Direct Coomb's Test was positive with positive Warm Antibodies, Anti-c, Anti-E. (done from the sample before she received transfusions).  Lab sending out to another [place] lab a sample from now (post  transfusion) was confirmation.   LDH is high but haptoglobin Normal. Check with Onc if this could be Warm AIHA ? Could it be related to her Anastrozole ?  i/v/o Pancytopenia, MDS also a differential .   GI bleed is  still a possibility as well. Although EGD was negative, GI was planning a capsule endoscopy but deferred for now for the other heme w/u.   CT Abdo now shows a new splenomegaly since January.   There are multiple Abdominal Lymph nodes & scattered hypodense renal cortical lesions. This raises the doubts of a possible lymphoma/malignancy ?   Per Onc - needs axillary LN biopsy. Prefer it inpatient. Sx will do it on Wednesday.   And eventually patient can get outpatient PET scan.   Also check with GI to get Capsule endoscopy done some time this admission ?   Patient was skeptical now agreed to stay when explained things will be faster if she stays and hematology wants to get the biopsy asap.    # Left > Right Axillary Lymph Nodes. Possible Reactional, but with history of a buttock skin lesion that was resected in 12/2017 (but found to be benign cancer), i/v/o above noted findings of CT Abdo, Sx will biopsy it on wednesday.   # Recently resected Buttock skin lesion : found to be benign cancer. Maintain outpatient f/u with Onc.     # # Breast cancer s/p lumpectomy and currently on anastrozole  continue with anastrozole for now unless oncology would stop it for now.  Supposidly patient was in remission, but on the current CT chest there was some axillary lymph nodes. Patient complaining of pain at the site.      # Small Pancreatic hypodense lesion : Request GI to take a look at this and give us their opinion.    #RANDALL : d/t severe Anemia     # Cough   Patient might have non diagnosed COPD (chronic smoking 50 pack year, stopped only 4 months ago)   possible acute bronchitis / viral upper resp tract infection  Will continue with symptomatic treatment only. no indication of antibiotics. will give some nebulizers and assess for symptoms improvement.  Consider Pulmonary consult (can be done outpatient) for PFTs r/o copd        # DVT proph (encourage ambulation, sequentials) no heparin, possible GI losses  GI prophylaxis  Regular diet  ambulate as tolerated  Full code  dispo home. 66 yo female patient with PMHx of right breast cancer (Well to moderately differentiated invasive ductal carcinoma ER+, IA+, Her-2 neg, stage T1c, N0, M0) s/p lumpectomy around 1.5 years ago, followed by radiation therapy and currently maintained on anastrazole, history of lower GI bleeding back in January 2018 which was attributed to hemorrhoids, s/p hemorrhoidectomy and fissurotomy, currently presented because of worsening generalized weakness, dyspnea on exertion and cough was found to have a Hg 5.8.    # Profound symptomatic anemia / Pancytopenia   Hematological/Malignancy related vs AIHA vs less likely GI bleed.   Although Guaiac was initially positive but EGD (5/23) only showed Gastritis.  her Direct Coomb's Test was positive with positive Warm Antibodies, Anti-c, Anti-E. (done from the sample before she received transfusions).  Lab sending out to another [place] lab a sample from now (post  transfusion) was confirmation.   LDH is high but haptoglobin Normal. Check with Onc if this could be Warm AIHA ? Could it be related to her Anastrozole ?  i/v/o Pancytopenia, MDS also a differential .   GI bleed is  still a possibility as well. Although EGD was negative, GI was planning a capsule endoscopy but deferred for now for the other heme w/u.   CT Abdo now shows a new splenomegaly since January.   There are multiple Abdominal Lymph nodes & scattered hypodense renal cortical lesions. Had low grade temp on 5/25 evening. This raises the doubts of a possible lymphoma/malignancy ?   Per Onc - needs axillary LN biopsy. Prefer it inpatient. Sx will do it on Wednesday.   And eventually patient can get outpatient PET scan.   Also check with GI to get Capsule endoscopy done some time this admission ?   Patient was skeptical now agreed to stay when explained things will be faster if she stays and hematology wants to get the biopsy asap.    # Left > Right Axillary Lymph Nodes. Possible Reactional, but with history of a buttock skin lesion that was resected in 12/2017 (but found to be benign cancer), i/v/o above noted findings of CT Abdo, Sx will biopsy it on wednesday.   # Recently resected Buttock skin lesion : found to be benign cancer. Maintain outpatient f/u with Onc.     # # Breast cancer s/p lumpectomy and currently on anastrozole  continue with anastrozole for now unless oncology would stop it for now.  Supposidly patient was in remission, but on the current CT chest there was some axillary lymph nodes. Patient complaining of pain at the site.      # Small Pancreatic hypodense lesion : Request GI to take a look at this and give us their opinion.    #RANDALL : d/t severe Anemia     # Cough   Patient might have non diagnosed COPD (chronic smoking 50 pack year, stopped only 4 months ago)   possible acute bronchitis / viral upper resp tract infection  Will continue with symptomatic treatment only. no indication of antibiotics. will give some nebulizers and assess for symptoms improvement.  Consider Pulmonary consult (can be done outpatient) for PFTs r/o copd        # DVT proph (encourage ambulation, sequentials) no heparin, possible GI losses  GI prophylaxis  Regular diet  ambulate as tolerated  Full code  dispo home.

## 2018-05-27 LAB — TM INTERPRETATION: SIGNIFICANT CHANGE UP

## 2018-05-27 RX ORDER — NYSTATIN CREAM 100000 [USP'U]/G
1 CREAM TOPICAL
Qty: 0 | Refills: 0 | Status: DISCONTINUED | OUTPATIENT
Start: 2018-05-27 | End: 2018-06-07

## 2018-05-27 RX ADMIN — PANTOPRAZOLE SODIUM 40 MILLIGRAM(S): 20 TABLET, DELAYED RELEASE ORAL at 17:22

## 2018-05-27 RX ADMIN — ALBUTEROL 2.5 MILLIGRAM(S): 90 AEROSOL, METERED ORAL at 09:26

## 2018-05-27 RX ADMIN — PANTOPRAZOLE SODIUM 40 MILLIGRAM(S): 20 TABLET, DELAYED RELEASE ORAL at 06:05

## 2018-05-27 RX ADMIN — ANASTROZOLE 1 MILLIGRAM(S): 1 TABLET ORAL at 11:24

## 2018-05-27 RX ADMIN — BUDESONIDE AND FORMOTEROL FUMARATE DIHYDRATE 2 PUFF(S): 160; 4.5 AEROSOL RESPIRATORY (INHALATION) at 19:40

## 2018-05-27 RX ADMIN — NYSTATIN CREAM 1 APPLICATION(S): 100000 CREAM TOPICAL at 17:21

## 2018-05-27 RX ADMIN — ALBUTEROL 2.5 MILLIGRAM(S): 90 AEROSOL, METERED ORAL at 20:05

## 2018-05-27 RX ADMIN — BUDESONIDE AND FORMOTEROL FUMARATE DIHYDRATE 2 PUFF(S): 160; 4.5 AEROSOL RESPIRATORY (INHALATION) at 08:50

## 2018-05-27 RX ADMIN — Medication 650 MILLIGRAM(S): at 17:21

## 2018-05-27 NOTE — PROGRESS NOTE ADULT - ASSESSMENT
# Working diagnosis: symptomatic anemia with pancytopenia  Patient was found to be Pancytopenic.  pt has elevated LDH, high retic count, positve warm antibodies, r/o anastrazole-induced AIHA, atbds sent to outiside lab, f.u results  -has history of lower GI bleeding few months ago, and was found to have positive guaiac in ED, no active bleed now  - gastritis on EGD  - keep Hb > 7  - s/p 3 transfusion since admission  - started on BID protonix, f/u Bx with GI  - pancytopenia w/u: LNs in axilla and abdomen suspicious for lymphoma, will need bx by surgery ( Dr Isaac will perform it on Wednesday 5/30)  - f/u flow cytometry result    # Cough and exertional shortness of breath  - resolved SOB, cough improved  - Patient might have non diagnosed COPD (chronic smoking 50 pack year, stopped only 4 months ago)  - possible acute bronchitis / viral upper resp tract infection  - symptomatic ttt    # History of buttock skin lesion s/p excision during hemorrhoidectomy surgery  Pathology showing malignancy of undetermined origin, clean margins of excision  Needs to follow up with oncology and address this issue too    # Breast cancer s/p lumpectomy and currently on anastrozole  - c/w anastrazole      # DVT proph (encourage ambulation, sequentials)   GI prophylaxis  Regular diet  ambulate as tolerated  Full code  dispo home

## 2018-05-27 NOTE — PROGRESS NOTE ADULT - SUBJECTIVE AND OBJECTIVE BOX
Patient is a 67y old  Female who presents with a chief complaint of cough, dyspnea on exertion, generalized weakness (21 May 2018 16:39)      Ovenight events:    PAST MEDICAL & SURGICAL HISTORY:  Gastrointestinal bleeding, lower: In january 2018  Breast CA  History of total left hip replacement  Status post right breast lumpectomy        FAMILY HISTORY:  No pertinent family history in first degree relatives        Allergies    No Known Allergies    Intolerances        acetaminophen   Tablet 650 milliGRAM(s) Oral every 6 hours PRN  ALBUTerol    0.083% 2.5 milliGRAM(s) Nebulizer every 6 hours  anastrozole 1 milliGRAM(s) Oral daily  buDESOnide 160 MICROgram(s)/formoterol 4.5 MICROgram(s) Inhaler 2 Puff(s) Inhalation two times a day  melatonin 5 milliGRAM(s) Oral once PRN  nicotine -  14 mG/24Hr(s) Patch 1 patch Transdermal daily  oxyCODONE    5 mG/acetaminophen 325 mG 1 Tablet(s) Oral every 6 hours PRN  pantoprazole    Tablet 40 milliGRAM(s) Oral two times a day  : Home Meds:      PHYSICAL EXAM    ICU Vital Signs Last 24 Hrs  T(C): 36.8 (27 May 2018 05:25), Max: 36.8 (27 May 2018 05:25)  T(F): 98.3 (27 May 2018 05:25), Max: 98.3 (27 May 2018 05:25)  HR: 78 (27 May 2018 05:25) (77 - 83)  BP: 123/65 (27 May 2018 05:25) (112/62 - 123/65)  BP(mean): --  ABP: --  ABP(mean): --  RR: 20 (27 May 2018 05:25) (20 - 20)  SpO2: 96% (26 May 2018 20:16) (96% - 98%)          General: NAD  HEENT: wnl  Lymph nodes: left axillary lymph node  Lungs: CTA BL  Cardiovascular: rs1s2 no murmur  Abdomen: soft non tender, splenomegaly appreciated  Extremities: +PP no LLE  Skin: wnl  Neurological: AAO3      LABS:                          8.5    2.81  )-----------( 100      ( 26 May 2018 07:27 )             26.6                                               05-26    140  |  100  |  11  ----------------------------<  145<H>  4.4   |  27  |  0.6<L>    Ca    9.4      26 May 2018 07:27  Mg     2.0     05-26                                                                                                                                                                                                                           X-Rays:                                                                                         ECHO:    MEDICATIONS  (STANDING):  ALBUTerol    0.083% 2.5 milliGRAM(s) Nebulizer every 6 hours  anastrozole 1 milliGRAM(s) Oral daily  buDESOnide 160 MICROgram(s)/formoterol 4.5 MICROgram(s) Inhaler 2 Puff(s) Inhalation two times a day  nicotine -  14 mG/24Hr(s) Patch 1 patch Transdermal daily  pantoprazole    Tablet 40 milliGRAM(s) Oral two times a day    MEDICATIONS  (PRN):  acetaminophen   Tablet 650 milliGRAM(s) Oral every 6 hours PRN For Temp greater than 38 C (100.4 F)  melatonin 5 milliGRAM(s) Oral once PRN Insomnia  oxyCODONE    5 mG/acetaminophen 325 mG 1 Tablet(s) Oral every 6 hours PRN Moderate Pain (4 - 6)

## 2018-05-28 LAB
ANION GAP SERPL CALC-SCNC: 15 MMOL/L — HIGH (ref 7–14)
ANION GAP SERPL CALC-SCNC: 16 MMOL/L — HIGH (ref 7–14)
APTT BLD: 26.1 SEC — LOW (ref 27–39.2)
BASOPHILS # BLD AUTO: 0.01 K/UL — SIGNIFICANT CHANGE UP (ref 0–0.2)
BASOPHILS NFR BLD AUTO: 0.3 % — SIGNIFICANT CHANGE UP (ref 0–1)
BLD GP AB SCN SERPL QL: (no result)
BUN SERPL-MCNC: 20 MG/DL — SIGNIFICANT CHANGE UP (ref 10–20)
BUN SERPL-MCNC: 23 MG/DL — HIGH (ref 10–20)
CALCIUM SERPL-MCNC: 8.7 MG/DL — SIGNIFICANT CHANGE UP (ref 8.5–10.1)
CALCIUM SERPL-MCNC: 8.9 MG/DL — SIGNIFICANT CHANGE UP (ref 8.5–10.1)
CHLORIDE SERPL-SCNC: 96 MMOL/L — LOW (ref 98–110)
CHLORIDE SERPL-SCNC: 98 MMOL/L — SIGNIFICANT CHANGE UP (ref 98–110)
CO2 SERPL-SCNC: 23 MMOL/L — SIGNIFICANT CHANGE UP (ref 17–32)
CO2 SERPL-SCNC: 24 MMOL/L — SIGNIFICANT CHANGE UP (ref 17–32)
CREAT SERPL-MCNC: 0.7 MG/DL — SIGNIFICANT CHANGE UP (ref 0.7–1.5)
CREAT SERPL-MCNC: 0.8 MG/DL — SIGNIFICANT CHANGE UP (ref 0.7–1.5)
EOSINOPHIL # BLD AUTO: 0.05 K/UL — SIGNIFICANT CHANGE UP (ref 0–0.7)
EOSINOPHIL NFR BLD AUTO: 1.6 % — SIGNIFICANT CHANGE UP (ref 0–8)
GLUCOSE SERPL-MCNC: 179 MG/DL — HIGH (ref 70–99)
GLUCOSE SERPL-MCNC: 209 MG/DL — HIGH (ref 70–99)
HCT VFR BLD CALC: 21.9 % — LOW (ref 37–47)
HCT VFR BLD CALC: 23.8 % — LOW (ref 37–47)
HGB BLD-MCNC: 7.1 G/DL — CRITICAL LOW (ref 12–16)
HGB BLD-MCNC: 7.6 G/DL — LOW (ref 12–16)
IMM GRANULOCYTES NFR BLD AUTO: 13.9 % — HIGH (ref 0.1–0.3)
INR BLD: 1.24 RATIO — SIGNIFICANT CHANGE UP (ref 0.65–1.3)
LYMPHOCYTES # BLD AUTO: 0.64 K/UL — LOW (ref 1.2–3.4)
LYMPHOCYTES # BLD AUTO: 20.2 % — LOW (ref 20.5–51.1)
MAGNESIUM SERPL-MCNC: 1.8 MG/DL — SIGNIFICANT CHANGE UP (ref 1.8–2.4)
MCHC RBC-ENTMCNC: 28.3 PG — SIGNIFICANT CHANGE UP (ref 27–31)
MCHC RBC-ENTMCNC: 28.7 PG — SIGNIFICANT CHANGE UP (ref 27–31)
MCHC RBC-ENTMCNC: 31.9 G/DL — LOW (ref 32–37)
MCHC RBC-ENTMCNC: 32.4 G/DL — SIGNIFICANT CHANGE UP (ref 32–37)
MCV RBC AUTO: 88.5 FL — SIGNIFICANT CHANGE UP (ref 81–99)
MCV RBC AUTO: 88.7 FL — SIGNIFICANT CHANGE UP (ref 81–99)
MONOCYTES # BLD AUTO: 0.33 K/UL — SIGNIFICANT CHANGE UP (ref 0.1–0.6)
MONOCYTES NFR BLD AUTO: 10.4 % — HIGH (ref 1.7–9.3)
NEUTROPHILS # BLD AUTO: 1.7 K/UL — SIGNIFICANT CHANGE UP (ref 1.4–6.5)
NEUTROPHILS NFR BLD AUTO: 53.6 % — SIGNIFICANT CHANGE UP (ref 42.2–75.2)
NRBC # BLD: 4 /100 WBCS — HIGH (ref 0–0)
NRBC # BLD: 6 /100 WBCS — HIGH (ref 0–0)
PHOSPHATE SERPL-MCNC: 3.9 MG/DL — SIGNIFICANT CHANGE UP (ref 2.1–4.9)
PLATELET # BLD AUTO: 112 K/UL — LOW (ref 130–400)
PLATELET # BLD AUTO: 127 K/UL — LOW (ref 130–400)
POTASSIUM SERPL-MCNC: 4.7 MMOL/L — SIGNIFICANT CHANGE UP (ref 3.5–5)
POTASSIUM SERPL-MCNC: 4.7 MMOL/L — SIGNIFICANT CHANGE UP (ref 3.5–5)
POTASSIUM SERPL-SCNC: 4.7 MMOL/L — SIGNIFICANT CHANGE UP (ref 3.5–5)
POTASSIUM SERPL-SCNC: 4.7 MMOL/L — SIGNIFICANT CHANGE UP (ref 3.5–5)
PROTHROM AB SERPL-ACNC: 13.5 SEC — HIGH (ref 9.95–12.87)
RBC # BLD: 2.47 M/UL — LOW (ref 4.2–5.4)
RBC # BLD: 2.69 M/UL — LOW (ref 4.2–5.4)
RBC # FLD: 19.8 % — HIGH (ref 11.5–14.5)
RBC # FLD: 19.9 % — HIGH (ref 11.5–14.5)
SODIUM SERPL-SCNC: 135 MMOL/L — SIGNIFICANT CHANGE UP (ref 135–146)
SODIUM SERPL-SCNC: 137 MMOL/L — SIGNIFICANT CHANGE UP (ref 135–146)
TYPE + AB SCN PNL BLD: SIGNIFICANT CHANGE UP
WBC # BLD: 3.17 K/UL — LOW (ref 4.8–10.8)
WBC # BLD: 3.27 K/UL — LOW (ref 4.8–10.8)
WBC # FLD AUTO: 3.17 K/UL — LOW (ref 4.8–10.8)
WBC # FLD AUTO: 3.27 K/UL — LOW (ref 4.8–10.8)

## 2018-05-28 RX ORDER — SODIUM CHLORIDE 9 MG/ML
1000 INJECTION INTRAMUSCULAR; INTRAVENOUS; SUBCUTANEOUS
Qty: 0 | Refills: 0 | Status: DISCONTINUED | OUTPATIENT
Start: 2018-05-28 | End: 2018-05-28

## 2018-05-28 RX ORDER — ZOLPIDEM TARTRATE 10 MG/1
5 TABLET ORAL ONCE
Qty: 0 | Refills: 0 | Status: DISCONTINUED | OUTPATIENT
Start: 2018-05-28 | End: 2018-05-28

## 2018-05-28 RX ADMIN — NYSTATIN CREAM 1 APPLICATION(S): 100000 CREAM TOPICAL at 17:23

## 2018-05-28 RX ADMIN — PANTOPRAZOLE SODIUM 40 MILLIGRAM(S): 20 TABLET, DELAYED RELEASE ORAL at 05:42

## 2018-05-28 RX ADMIN — ALBUTEROL 2.5 MILLIGRAM(S): 90 AEROSOL, METERED ORAL at 19:26

## 2018-05-28 RX ADMIN — ALBUTEROL 2.5 MILLIGRAM(S): 90 AEROSOL, METERED ORAL at 13:36

## 2018-05-28 RX ADMIN — PANTOPRAZOLE SODIUM 40 MILLIGRAM(S): 20 TABLET, DELAYED RELEASE ORAL at 17:24

## 2018-05-28 RX ADMIN — ZOLPIDEM TARTRATE 5 MILLIGRAM(S): 10 TABLET ORAL at 21:27

## 2018-05-28 RX ADMIN — Medication 100 MILLIGRAM(S): at 10:14

## 2018-05-28 RX ADMIN — NYSTATIN CREAM 1 APPLICATION(S): 100000 CREAM TOPICAL at 05:42

## 2018-05-28 RX ADMIN — BUDESONIDE AND FORMOTEROL FUMARATE DIHYDRATE 2 PUFF(S): 160; 4.5 AEROSOL RESPIRATORY (INHALATION) at 08:30

## 2018-05-28 RX ADMIN — BUDESONIDE AND FORMOTEROL FUMARATE DIHYDRATE 2 PUFF(S): 160; 4.5 AEROSOL RESPIRATORY (INHALATION) at 17:06

## 2018-05-28 RX ADMIN — ANASTROZOLE 1 MILLIGRAM(S): 1 TABLET ORAL at 11:04

## 2018-05-28 NOTE — PROGRESS NOTE ADULT - SUBJECTIVE AND OBJECTIVE BOX
SUBJECTIVE:    Patient is a 67y old Female who presents with a chief complaint of cough, dyspnea on exertion, generalized weakness (21 May 2018 16:39)    Currently admitted to medicine with the primary diagnosis of Anemia    PAST MEDICAL & SURGICAL HISTORY  Gastrointestinal bleeding, lower: In january 2018  Breast CA  History of total left hip replacement  Status post right breast lumpectomy    SOCIAL HISTORY:  Negative for smoking/alcohol/drug use.     ALLERGIES:  No Known Allergies    MEDICATIONS:  STANDING MEDICATIONS  ALBUTerol    0.083% 2.5 milliGRAM(s) Nebulizer every 6 hours  anastrozole 1 milliGRAM(s) Oral daily  buDESOnide 160 MICROgram(s)/formoterol 4.5 MICROgram(s) Inhaler 2 Puff(s) Inhalation two times a day  nicotine -  14 mG/24Hr(s) Patch 1 patch Transdermal daily  nystatin Powder 1 Application(s) Topical two times a day  pantoprazole    Tablet 40 milliGRAM(s) Oral two times a day    PRN MEDICATIONS  acetaminophen   Tablet 650 milliGRAM(s) Oral every 6 hours PRN  melatonin 5 milliGRAM(s) Oral once PRN  oxyCODONE    5 mG/acetaminophen 325 mG 1 Tablet(s) Oral every 6 hours PRN    VITALS:   T(F): 100.7  HR: 89  BP: 113/59  RR: 20  SpO2: 97%    LABS:      RADIOLOGY:    PHYSICAL EXAM:  GEN: No acute distress  LUNGS: Clear to auscultation bilaterally   HEART: S1/S2 present. RRR.   ABD: Soft, non-tender, non-distended. Bowel sounds present  EXT: NC/NC/NE/2+PP/RAJAN  NEURO: AAOX3

## 2018-05-28 NOTE — DIETITIAN INITIAL EVALUATION ADULT. - OTHER INFO
Initial assessment for NPO/CL x 5 days. Per pt. on regular diet since day 2 this admit. P/w cough, dyspnea on exertion, generalized weakness, anemia. Positive guaiac. Gastritis on EGD, no active bleed. Heme/Onc following.  Cough and exertional shortness of breath- resolved. History of buttock skin lesion s/p excision during hemorrhoidectomy surgery- pathology showing malignancy of undetermined origin. Dispo: home.

## 2018-05-28 NOTE — PROGRESS NOTE ADULT - ASSESSMENT
68 yo female patient with PMHx of right breast cancer (Well to moderately differentiated invasive ductal carcinoma ER+, DE+, Her-2 neg, stage T1c, N0, M0) s/p lumpectomy around 1.5 years ago, followed by radiation therapy and currently maintained on anastrazole, history of lower GI bleeding back in January 2018 which was attributed to hemorrhoids, s/p hemorrhoidectomy and fissurotomy, currently presented because of worsening generalized weakness, dyspnea on exertion and cough. Patient in fact has multiple complaints:  1- She complains of few days of productive cough, clear phlegm, associated with left armpit pain, radiating down her left side, worse with coughing and with deep inspiration. Patient is a former smoker of around 50 pack year, stopped 4 months ago. Denies sore throat, rhinorrhea, fever...  2- Patient is also having generalized weakness and exertional shortness of breath, getting worse over the last month or so.  3- Patient is complaining of intermittent headaches recently  In ED, patient was found to have severe anemia (Hg 5.8) and positive guaiac, admitted for evaluation. (21 May 2018 16:39)    #) Symptomatic anemia with pancytopenia likely 22 anastrozole  -pt has elevated LDH, high retic count, positive warm antibodies, r/o anastrazole-induced AIHA, atbds sent to outiside lab, f.u results  -has history of lower GI bleeding few months ago, and was found to have positive guaiac in ED, no active bleed now  -gastritis on EGD  -keep Hb> 7  -s/p 3 transfusion since admission  -started on BID protonix, f/u Bx with GI  -pancytopenia w/u: LNs in axilla and abdomen suspicious for lymphoma, will need bx by surgery ( Dr Isaac will perform it on Wednesday 5/30)  -f/u flow cytometry result  -GI following (Dr. Martinez)- capsule endoscopy held since the CT scan showed suspicion of lymphoma so hemonc work up is currently more imminent for the patient especially that she does not have any signs of active GI bleed.   -Heme/Onc following (Dr. Miller)- Surgery evaluation for axillary LN biopsy  -Sx following (Dr. Thomas)- pre op labs on Tuesday including type and screen, bx on wednesday (05/30)    #) Cough and exertional shortness of breath  -resolved SOB, cough improved  -Patient might have non diagnosed COPD (chronic smoking 50 pack year, stopped only 4 months ago)  -possible acute bronchitis / viral upper resp tract infection    #) History of buttock skin lesion s/p excision during hemorrhoidectomy surgery  -Pathology showing malignancy of undetermined origin, clean margins of excision  -Needs to follow up with oncology and address this issue too    #) Breast cancer s/p lumpectomy and currently on anastrozole  -c/w anastrazole    #) DVT/ GI ppx  -encourage ambulation, sequentials/ protonix    #) Code Status  -full code    #) Dispo  -from home

## 2018-05-29 LAB
ALLERGY+IMMUNOLOGY DIAG STUDY NOTE: SIGNIFICANT CHANGE UP
ANION GAP SERPL CALC-SCNC: 12 MMOL/L — SIGNIFICANT CHANGE UP (ref 7–14)
APTT BLD: 25.9 SEC — LOW (ref 27–39.2)
BUN SERPL-MCNC: 17 MG/DL — SIGNIFICANT CHANGE UP (ref 10–20)
CALCIUM SERPL-MCNC: 7.3 MG/DL — LOW (ref 8.5–10.1)
CHLORIDE SERPL-SCNC: 107 MMOL/L — SIGNIFICANT CHANGE UP (ref 98–110)
CO2 SERPL-SCNC: 22 MMOL/L — SIGNIFICANT CHANGE UP (ref 17–32)
CREAT SERPL-MCNC: 0.5 MG/DL — LOW (ref 0.7–1.5)
GLUCOSE SERPL-MCNC: 94 MG/DL — SIGNIFICANT CHANGE UP (ref 70–99)
HCT VFR BLD CALC: 17.8 % — LOW (ref 37–47)
HGB BLD-MCNC: 5.8 G/DL — CRITICAL LOW (ref 12–16)
INR BLD: 1.3 RATIO — SIGNIFICANT CHANGE UP (ref 0.65–1.3)
MAGNESIUM SERPL-MCNC: 1.7 MG/DL — LOW (ref 1.8–2.4)
MCHC RBC-ENTMCNC: 29.7 PG — SIGNIFICANT CHANGE UP (ref 27–31)
MCHC RBC-ENTMCNC: 32.6 G/DL — SIGNIFICANT CHANGE UP (ref 32–37)
MCV RBC AUTO: 91.3 FL — SIGNIFICANT CHANGE UP (ref 81–99)
NRBC # BLD: 4 /100 WBCS — HIGH (ref 0–0)
PLATELET # BLD AUTO: 107 K/UL — LOW (ref 130–400)
POTASSIUM SERPL-MCNC: 3.4 MMOL/L — LOW (ref 3.5–5)
POTASSIUM SERPL-SCNC: 3.4 MMOL/L — LOW (ref 3.5–5)
PROTHROM AB SERPL-ACNC: 14.1 SEC — HIGH (ref 9.95–12.87)
RBC # BLD: 1.95 M/UL — LOW (ref 4.2–5.4)
RBC # FLD: 20.5 % — HIGH (ref 11.5–14.5)
SODIUM SERPL-SCNC: 141 MMOL/L — SIGNIFICANT CHANGE UP (ref 135–146)
WBC # BLD: 2.6 K/UL — LOW (ref 4.8–10.8)
WBC # FLD AUTO: 2.6 K/UL — LOW (ref 4.8–10.8)

## 2018-05-29 PROCEDURE — 99232 SBSQ HOSP IP/OBS MODERATE 35: CPT | Mod: 57

## 2018-05-29 RX ORDER — POTASSIUM CHLORIDE 20 MEQ
40 PACKET (EA) ORAL EVERY 4 HOURS
Qty: 0 | Refills: 0 | Status: COMPLETED | OUTPATIENT
Start: 2018-05-29 | End: 2018-05-29

## 2018-05-29 RX ORDER — SODIUM CHLORIDE 9 MG/ML
1000 INJECTION INTRAMUSCULAR; INTRAVENOUS; SUBCUTANEOUS
Qty: 0 | Refills: 0 | Status: DISCONTINUED | OUTPATIENT
Start: 2018-05-29 | End: 2018-06-01

## 2018-05-29 RX ORDER — AMPICILLIN SODIUM AND SULBACTAM SODIUM 250; 125 MG/ML; MG/ML
INJECTION, POWDER, FOR SUSPENSION INTRAMUSCULAR; INTRAVENOUS
Qty: 0 | Refills: 0 | Status: DISCONTINUED | OUTPATIENT
Start: 2018-05-29 | End: 2018-05-31

## 2018-05-29 RX ORDER — AMPICILLIN SODIUM AND SULBACTAM SODIUM 250; 125 MG/ML; MG/ML
1.5 INJECTION, POWDER, FOR SUSPENSION INTRAMUSCULAR; INTRAVENOUS EVERY 6 HOURS
Qty: 0 | Refills: 0 | Status: DISCONTINUED | OUTPATIENT
Start: 2018-05-30 | End: 2018-05-31

## 2018-05-29 RX ORDER — AMPICILLIN SODIUM AND SULBACTAM SODIUM 250; 125 MG/ML; MG/ML
1.5 INJECTION, POWDER, FOR SUSPENSION INTRAMUSCULAR; INTRAVENOUS ONCE
Qty: 0 | Refills: 0 | Status: COMPLETED | OUTPATIENT
Start: 2018-05-29 | End: 2018-05-29

## 2018-05-29 RX ADMIN — SODIUM CHLORIDE 75 MILLILITER(S): 9 INJECTION INTRAMUSCULAR; INTRAVENOUS; SUBCUTANEOUS at 05:54

## 2018-05-29 RX ADMIN — ALBUTEROL 2.5 MILLIGRAM(S): 90 AEROSOL, METERED ORAL at 20:07

## 2018-05-29 RX ADMIN — ALBUTEROL 2.5 MILLIGRAM(S): 90 AEROSOL, METERED ORAL at 07:58

## 2018-05-29 RX ADMIN — SODIUM CHLORIDE 75 MILLILITER(S): 9 INJECTION INTRAMUSCULAR; INTRAVENOUS; SUBCUTANEOUS at 21:21

## 2018-05-29 RX ADMIN — NYSTATIN CREAM 1 APPLICATION(S): 100000 CREAM TOPICAL at 05:54

## 2018-05-29 RX ADMIN — NYSTATIN CREAM 1 APPLICATION(S): 100000 CREAM TOPICAL at 18:11

## 2018-05-29 RX ADMIN — BUDESONIDE AND FORMOTEROL FUMARATE DIHYDRATE 2 PUFF(S): 160; 4.5 AEROSOL RESPIRATORY (INHALATION) at 08:01

## 2018-05-29 RX ADMIN — PANTOPRAZOLE SODIUM 40 MILLIGRAM(S): 20 TABLET, DELAYED RELEASE ORAL at 18:11

## 2018-05-29 RX ADMIN — BUDESONIDE AND FORMOTEROL FUMARATE DIHYDRATE 2 PUFF(S): 160; 4.5 AEROSOL RESPIRATORY (INHALATION) at 21:05

## 2018-05-29 RX ADMIN — ANASTROZOLE 1 MILLIGRAM(S): 1 TABLET ORAL at 11:23

## 2018-05-29 RX ADMIN — PANTOPRAZOLE SODIUM 40 MILLIGRAM(S): 20 TABLET, DELAYED RELEASE ORAL at 05:54

## 2018-05-29 RX ADMIN — Medication 40 MILLIEQUIVALENT(S): at 21:20

## 2018-05-29 RX ADMIN — ALBUTEROL 2.5 MILLIGRAM(S): 90 AEROSOL, METERED ORAL at 13:29

## 2018-05-29 RX ADMIN — Medication 650 MILLIGRAM(S): at 21:19

## 2018-05-29 RX ADMIN — AMPICILLIN SODIUM AND SULBACTAM SODIUM 100 GRAM(S): 250; 125 INJECTION, POWDER, FOR SUSPENSION INTRAMUSCULAR; INTRAVENOUS at 18:11

## 2018-05-29 NOTE — PROGRESS NOTE ADULT - ASSESSMENT
Lymphadenopathy with pancytopenia: ?Lymphoma. Lymphadenopathy especially in axilla could also be explained by possible breast Ca recurrence.   --Surgery evaluation for axillary LN biopsy  --has e/o blasts on peripheral smear. ?lymphoblastic lymphoma, will probably need BM biopsy  --Transfuse 2 units PRBC    Macrocytic Anemia: Multifactorial (GI bleeding + ?anemia of chronic disease + mild AIHA)  -- B12 and folate WNL.   --Check MMA    DVT/GI ppx: Sequentials only Lymphadenopathy with pancytopenia: ?Lymphoma. Lymphadenopathy especially in axilla could also be explained by possible breast Ca recurrence.   --Surgery evaluation for axillary LN biopsy  --has e/o blasts on peripheral smear. ?lymphoblastic lymphoma, will probably need BM biopsy  --Transfuse 2 units PRBC    Macrocytic Anemia: Multifactorial (GI bleeding + ?anemia of chronic disease + mild AIHA)  -- B12 and folate WNL.   --Check MMA, LDH, retic, haptoglobin and LFTs    DVT/GI ppx: Sequentials only

## 2018-05-29 NOTE — PROGRESS NOTE ADULT - SUBJECTIVE AND OBJECTIVE BOX
Going for lymph node biopsy, possibly today    Subjective: Pt has no active complaints. Has been NPO since midnight.    Objective:   PAST MEDICAL & SURGICAL HISTORY:  Gastrointestinal bleeding, lower: In 2018  Breast CA  History of total left hip replacement  Status post right breast lumpectomy      Vital Signs Last 24 Hrs  T(C): 37.6 (29 May 2018 05:49), Max: 37.6 (29 May 2018 05:49)  T(F): 99.6 (29 May 2018 05:49), Max: 99.6 (29 May 2018 05:49)  HR: 86 (29 May 2018 05:49) (86 - 94)  BP: 109/64 (29 May 2018 05:49) (103/58 - 120/67)  BP(mean): --  RR: 18 (29 May 2018 05:49) (18 - 20)  SpO2: --        I&O's Detail                 7.1    3.17  )-----------( 127      (  @ 19:06 )             21.9                7.6    3.27  )-----------( 112      (  @ 12:12 )             23.8                    135   |  96    |  23                 Ca: 8.7    BMP:   ----------------------------< 209    M.8   (18 @ 19:06)             4.7    |  23    | 0.8                Ph: 3.9      LFT:     TPro: 7.3 / Alb: 3.6 / TBili: 1.1 / DBili: x / AST: 47 / ALT: 39 / AlkPhos: 315   (18 @ 17:18)          PT/INR - ( 28 May 2018 19:06 )   PT: 13.50 sec;   INR: 1.24 ratio         PTT - ( 28 May 2018 19:06 )  PTT:26.1 sec                          MEDICATIONS  (STANDING):  ALBUTerol    0.083% 2.5 milliGRAM(s) Nebulizer every 6 hours  anastrozole 1 milliGRAM(s) Oral daily  buDESOnide 160 MICROgram(s)/formoterol 4.5 MICROgram(s) Inhaler 2 Puff(s) Inhalation two times a day  nicotine -  14 mG/24Hr(s) Patch 1 patch Transdermal daily  nystatin Powder 1 Application(s) Topical two times a day  pantoprazole    Tablet 40 milliGRAM(s) Oral two times a day  sodium chloride 0.9%. 1000 milliLiter(s) (75 mL/Hr) IV Continuous <Continuous>    MEDICATIONS  (PRN):  acetaminophen   Tablet 650 milliGRAM(s) Oral every 6 hours PRN For Temp greater than 38 C (100.4 F)  guaiFENesin    Syrup 100 milliGRAM(s) Oral every 6 hours PRN Cough  melatonin 5 milliGRAM(s) Oral once PRN Insomnia  oxyCODONE    5 mG/acetaminophen 325 mG 1 Tablet(s) Oral every 6 hours PRN Moderate Pain (4 - 6)      Diet, NPO:   Except Medications (18 @ 05:24)      PHYSICAL EXAM:  General Appearance: Appears well, NAD  Neck: Supple  Chest: Equal expansion bilaterally, equal breath sounds  CV: S1, S2, RRR  Abdomen: Soft, NT, ND  Extremities: Grossly symmetric, WNL  Neuro: A&Ox3

## 2018-05-29 NOTE — CHART NOTE - NSCHARTNOTEFT_GEN_A_CORE
Called by nurse that pt has increase in temp after one unit transfusion temp went up from 98.6 to 100.7 (37 c to 38.1C)  Assessed pt at bedside she doesn't have any complaints. Didn't feel temp, no chills, itching, flank pain, SOB, Chest pain.  Vitals BP: 121/85, HR 88, RR 16, room air not in distress, on examination no abnormalities.  She doesn't fit into the criteria of febrile non hemolytic transfusion reaction because of inc in temp is <2 degree celcius.    Plan: wait for 2 hours and recheck the temp if more than 101 will stop the transfusion if it is same or less will continue transfusion.  will keep close eye during transfusion. Called by RN that pt has increase in temp after one unit transfusion temp went up from 98.6 to 100.7 (37 c to 38.1C)  Assessed pt at bedside she doesn't have any complaints. Didn't feel temp, no chills, itching, flank pain, SOB, Chest pain.  Vitals BP: 121/85, HR 88, RR 16, room air not in distress, on examination no abnormalities.  She doesn't fit into the criteria of febrile non hemolytic transfusion reaction because of inc in temp is <2 degree celcius.    Plan: wait for 2 hours and recheck the temp if more than 101 will stop the transfusion if it is same or less will continue transfusion.  will keep close eye during transfusion.

## 2018-05-29 NOTE — PROGRESS NOTE ADULT - SUBJECTIVE AND OBJECTIVE BOX
HPI:  68 yo female patient with PMHx of right breast cancer (Well to moderately differentiated invasive ductal carcinoma ER+, [place]+, Her-2 neg, stage T1c, N0, M0) s/p lumpectomy around 1.5 years ago, followed by radiation therapy and currently maintained on anastrazole, history of lower GI bleeding back in 2018 which was attributed to hemorrhoids, s/p hemorrhoidectomy and fissurotomy, currently presented because of worsening generalized weakness, dyspnea on exertion and cough.  Patient in fact has multiple complaints:  1- She complains of few days of productive cough, clear phlegm, associated with left armpit pain, radiating down her left side, worse with coughing and with deep inspiration. Patient is a former smoker of around 50 pack year, stopped 4 months ago. Denies sore throat, rhinorrhea, fever...  2- Patient is also having generalized weakness and exertional shortness of breath, getting worse over the last month or so.  3- Patient is complaining of intermittent headaches recently    In ED, patient was found to have severe anemia (Hg 5.8) and positive guaiac, admitted for evaluation.   Of note, patient had a screening colonoscopy done in 2017 which was complicated by bleeding and hemorrhoidectomy. 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 She also notes a 25 lb weight loss in the past 2 months without any decrease in appetite.      Pt seen and examined at bedside.     Vital Signs Last 24 Hrs  T(C): 37.4 (29 May 2018 12:40), Max: 37.6 (29 May 2018 05:49)  T(F): 99.4 (29 May 2018 12:40), Max: 99.6 (29 May 2018 05:49)  HR: 89 (29 May 2018 12:40) (86 - 94)  BP: 116/53 (29 May 2018 12:40) (103/58 - 120/67)  BP(mean): --  RR: 20 (29 May 2018 12:40) (18 - 20)  SpO2: --    MEDICATIONS  (STANDING):  ALBUTerol    0.083% 2.5 milliGRAM(s) Nebulizer every 6 hours  anastrozole 1 milliGRAM(s) Oral daily  buDESOnide 160 MICROgram(s)/formoterol 4.5 MICROgram(s) Inhaler 2 Puff(s) Inhalation two times a day  nicotine -  14 mG/24Hr(s) Patch 1 patch Transdermal daily  nystatin Powder 1 Application(s) Topical two times a day  pantoprazole    Tablet 40 milliGRAM(s) Oral two times a day  sodium chloride 0.9%. 1000 milliLiter(s) (75 mL/Hr) IV Continuous <Continuous>    MEDICATIONS  (PRN):  acetaminophen   Tablet 650 milliGRAM(s) Oral every 6 hours PRN For Temp greater than 38 C (100.4 F)  guaiFENesin    Syrup 100 milliGRAM(s) Oral every 6 hours PRN Cough  melatonin 5 milliGRAM(s) Oral once PRN Insomnia  oxyCODONE    5 mG/acetaminophen 325 mG 1 Tablet(s) Oral every 6 hours PRN Moderate Pain (4 - 6)    Labs:                        5.8    2.60  )-----------( 107      ( 29 May 2018 09:25 )             17.8             05-29    141  |  107  |  17  ----------------------------<  94  3.4<L>   |  22  |  0.5<L>    Ca    7.3<L>      29 May 2018 09:25  Phos  3.9     05-28  Mg     1.7                   PT/INR - ( 29 May 2018 09:25 )   PT: 14.10 sec;   INR: 1.30 ratio         PTT - ( 29 May 2018 09:25 )  PTT:25.9 sec      Flow Cytometry Order. (. @ 16:38)    TM Interpretation:   Flow Cytometry Final Report  ________________________________________________________________________  Specimen: Peripheral blood  Collected: 2018 16:38  Received: 2018 12:29  Processed: 2018 12:29  Reported: 2018 9:29  Accession #: [id number]  Surgical Pathology Number:  ________________________________________________________________________  CLINICAL DATA: R/O lymphoma  ________________________________________________________________________  ________________________________________________________________________  DIAGNOSIS:  Peripheral blood:       - The immunophenotypic findings show increase in blast population 3%.  Please see interpretation.    INTERPRETATION:  MORPHOLOGY: Smear shows neutrophils, lymphocytes and few immature cells  CYTOSPIN:    IMMUNOPHENOTYPE: CD45/side scatter shows 3% blast population positive for CD34, , HLA-DR,  CD13, CD33, partial CD11b; negative for CD14, CD15 and CD64. Lymphocytes show a heterogeneous  population of T-cells (with normal CD4 to CD8 ratio), natural killer cells, and polytypic B-cells.    The immunophenotypic findings show increase in blast population 3%.    _____________________________________________________________________  Viability ................. 96 %    Values reported are based on the lymphocyte gate. (Bright CD45 positive; low side scatter, low  forward scatter).  48% of cells.  CD45 .......... 100 %  CD2 ........... 91 %  CD3 ........... 62 %  CD5 ........... 61 %  CD7 ........... 73 %  CD4 ........... 48 %  CD8 ........... 9 %  CD16 .......... 9 %  CD56........... 11 %  CD57 .......... 11 %  CD10 .......... 1 %  CD19 .......... 3 %  CD20 .......... 2 %  CD23 .......... 1 %  FMC-7 ......... 4 %  CD19/kappa ......... 2 %  CD19/lambda ........ 1 %  HLA-DR ........ 26 %  CD38 .......... 13 %    Results reported are based on an open gate.    CD45 .......... 100 %  CD16 .......... 44 %  CD14 .......... 1 %  CD64 .......... 24 %  HLA-DR ........ 20 %  CD34 .......... 3 %   ......... 3 %  CD11b ......... 44 %  CD13 .......... 36 %  CD15 .......... 12 %  CD33 .......... 18 %        Verified By: Yael Antonio M.D.  (Electronic Signature)    This test was developed and its performance characteristics determined by the Flow Cytometry  Laboratory at [facility]. It has not been cleared or approved by the  U.S. Food and Drug Administration. The FDA has determined that such clearance or approval is not  necessary. This test is used for clinical purposes. It should not be regarded as investigational or  for research. This laboratory is certified under the Clinical Laboratory Improvement Amendment of  1988 ("CLIA") as qualified to perform high complexity clinical testing.      Immunofixation, Serum (18 @ 22:53)    Immunofixation, Serum:    Two IgG Kappa Bands  Identified    Reference Range: None Detected    Protein Electrophoresis, Serum (18 @ 22:53)    Protein Total, Serum: 7.7 g/dL    Total Protein, Serum: 7.7 g/dL    Albumin, Serum: 3.4 g/dL    Alpha 1: 0.6 g/dL    Alpha 2: 0.7 g/dL    Beta Globulin: 1.6 g/dL    Gamma Globulin: 1.4 g/dL    M-Carlos: See Note g/dL    % Albumin: 44.1 %    % Alpha 1: 7.8 %    % Alpha 2: 9.6 %    % Beta: 20.3 %    % Gamma: 18.2 %    % M Carlos: See Note: M-Carlos 1 = 10.6 % = 0.8 g/dl = Beta Region  M-Carlos 2 = 4.1  %  = 0.3 g/dl = Gamma Region %    Albumin/Globulin Ratio: 0.8 Ratio    Serum Protein Electrophoresis Interp: Identified    Immunoglobulin Free Light Chains, Serum (18 @ 08:33)    TASHA Kappa: 8.01 mg/dL    TASHA Lambda: 4.16 mg/dL    Kappa/Lambda Free Light Chain Ratio, Serum: 1.93 Ratio       Immunoglobulins Panel (18 @ 08:33)    Quantitative Ig: Please Note: The Reference range has changed for this test due to an  upgrade in the testing methodology ( Turbidometry - Optilite Instrument).  Results are flagged as In Range  or Out of Range based upon the updated  reference range as of 2018. mg/dL    Quantitative IgA: 195: Please Note: The Reference range has changed for this test due to an  upgrade in the testing methodology ( Turbidometry - Optilite Instrument).  Results are flagged as In Range  or Out of Range based upon the updated  reference range as of 2018. mg/dL    Quantitative IgM: 402: Please Note: The Reference range has changed for this test due to an  upgrade in the testing methodology ( Turbidometry - Optilite Instrument).  Results are flagged as In Range  or Out of Range based upon the updated  reference range as of 2018. mg/dL

## 2018-05-29 NOTE — PROGRESS NOTE ADULT - ASSESSMENT
66 yo female patient with PMHx of right breast cancer (Well to moderately differentiated invasive ductal carcinoma ER+, [place]+, Her-2 neg, stage T1c, N0, M0) s/p lumpectomy around 1.5 years ago, followed by radiation therapy and currently maintained on anastrazole, history of lower GI bleeding back in January 2018 which was attributed to hemorrhoids, s/p hemorrhoidectomy and fissurotomy, currently presented because of worsening generalized weakness, dyspnea on exertion and cough. Patient in fact has multiple complaints:  1- She complains of few days of productive cough, clear phlegm, associated with left armpit pain, radiating down her left side, worse with coughing and with deep inspiration. Patient is a former smoker of around 50 pack year, stopped 4 months ago. Denies sore throat, rhinorrhea, fever...  2- Patient is also having generalized weakness and exertional shortness of breath, getting worse over the last month or so.  3- Patient is complaining of intermittent headaches recently  In ED, patient was found to have severe anemia (Hg 5.8) and positive guaiac, admitted for evaluation. (21 May 2018 16:39)    #) Symptomatic anemia with pancytopenia likely 2/2 anastrozole  -pt has elevated LDH, high retic count, positive warm antibodies, r/o anastrazole-induced AIHA, atbds sent to outiside lab, f/u results  -has history of lower GI bleeding few months ago, and was found to have positive guaiac in ED, no active bleed now  -gastritis on EGD  -keep Hb> 7  -s/p 3 transfusion since admission  -started on BID protonix, f/u Bx with GI  -pancytopenia w/u: LNs in axilla and abdomen suspicious for lymphoma, will need bx by surgery ( Dr Isaac will perform it on Wednesday 5/30)  -f/u flow cytometry result  -GI following (Dr. Martinez)- capsule endoscopy held since the CT scan showed suspicion of lymphoma so hemonc work up is currently more imminent for the patient especially that she does not have any signs of active GI bleed.   -Heme/Onc following (Dr. Miller)- Surgery evaluation for axillary LN biopsy  -Sx following (Dr. Thomas)- pre op labs on Tuesday including type and screen, bx on wednesday (05/30) of LN, L>R    #) Cough and exertional shortness of breath  -resolved SOB, cough improved  -Patient might have non diagnosed COPD (chronic smoking 50 pack year, stopped only 4 months ago)  -possible acute bronchitis / viral upper resp tract infection    #) History of buttock skin lesion s/p excision during hemorrhoidectomy surgery  -Pathology showing malignancy of undetermined origin, clean margins of excision  -Needs to follow up with oncology and address this issue too    #) Breast cancer s/p lumpectomy and currently on anastrozole  -c/w anastrazole    #) DVT/ GI ppx  -encourage ambulation, sequentials/ protonix    #) Code Status  -full code    #) Dispo  -from home 66 yo female patient with PMHx of right breast cancer (Well to moderately differentiated invasive ductal carcinoma ER+, [place]+, Her-2 neg, stage T1c, N0, M0) s/p lumpectomy around 1.5 years ago, followed by radiation therapy and currently maintained on anastrazole, history of lower GI bleeding back in January 2018 which was attributed to hemorrhoids, s/p hemorrhoidectomy and fissurotomy, currently presented because of worsening generalized weakness, dyspnea on exertion and cough. Patient in fact has multiple complaints:  1- She complains of few days of productive cough, clear phlegm, associated with left armpit pain, radiating down her left side, worse with coughing and with deep inspiration. Patient is a former smoker of around 50 pack year, stopped 4 months ago. Denies sore throat, rhinorrhea, fever...  2- Patient is also having generalized weakness and exertional shortness of breath, getting worse over the last month or so.  3- Patient is complaining of intermittent headaches recently  In ED, patient was found to have severe anemia (Hg 5.8) and positive guaiac, admitted for evaluation. (21 May 2018 16:39)    #) Symptomatic anemia with pancytopenia likely 2/2 anastrozole  -pt has elevated LDH, high retic count, positive warm antibodies, r/o anastrazole-induced AIHA, atbds sent to outiside lab, f/u results  -has history of lower GI bleeding few months ago, and was found to have positive guaiac in ED, no active bleed now  -gastritis on EGD  -keep Hb> 7  -s/p 3 transfusion since admission  -started on BID protonix, f/u Bx with GI  -pancytopenia w/u: LNs in axilla and abdomen suspicious for lymphoma, will need bx by surgery ( Dr Isaac will perform it on Wednesday 5/30)  -f/u flow cytometry result  -GI following (Dr. Martinez)- capsule endoscopy held since the CT scan showed suspicion of lymphoma so hemonc work up is currently more imminent for the patient especially that she does not have any signs of active GI bleed.   -Heme/Onc following (Dr. Miller)- Surgery evaluation for axillary LN biopsy  -Sx following (Dr. Thomas)-LN bx today (05/29/18)    #) Cough and exertional shortness of breath  -resolved SOB, cough improved  -Patient might have non diagnosed COPD (chronic smoking 50 pack year, stopped only 4 months ago)  -possible acute bronchitis / viral upper resp tract infection    #) History of buttock skin lesion s/p excision during hemorrhoidectomy surgery  -Pathology showing malignancy of undetermined origin, clean margins of excision  -Needs to follow up with oncology and address this issue too    #) Breast cancer s/p lumpectomy and currently on anastrozole  -c/w anastrazole    #) DVT/ GI ppx  -encourage ambulation, sequentials/ protonix    #) Code Status  -full code    #) Dispo  -from home

## 2018-05-29 NOTE — PROGRESS NOTE ADULT - SUBJECTIVE AND OBJECTIVE BOX
SUBJECTIVE:    Patient is a 67y old Female who presents with a chief complaint of cough, dyspnea on exertion, generalized weakness (21 May 2018 16:39)    Currently admitted to medicine with the primary diagnosis of Anemia    PAST MEDICAL & SURGICAL HISTORY  Gastrointestinal bleeding, lower: In january 2018  Breast CA  History of total left hip replacement  Status post right breast lumpectomy    SOCIAL HISTORY:  Negative for smoking/alcohol/drug use.     ALLERGIES:  No Known Allergies    MEDICATIONS:  MEDICATIONS  (STANDING):  ALBUTerol    0.083% 2.5 milliGRAM(s) Nebulizer every 6 hours  anastrozole 1 milliGRAM(s) Oral daily  buDESOnide 160 MICROgram(s)/formoterol 4.5 MICROgram(s) Inhaler 2 Puff(s) Inhalation two times a day  nicotine -  14 mG/24Hr(s) Patch 1 patch Transdermal daily  nystatin Powder 1 Application(s) Topical two times a day  pantoprazole    Tablet 40 milliGRAM(s) Oral two times a day  sodium chloride 0.9%. 1000 milliLiter(s) (75 mL/Hr) IV Continuous <Continuous>    MEDICATIONS  (PRN):  acetaminophen   Tablet 650 milliGRAM(s) Oral every 6 hours PRN For Temp greater than 38 C (100.4 F)  guaiFENesin    Syrup 100 milliGRAM(s) Oral every 6 hours PRN Cough  melatonin 5 milliGRAM(s) Oral once PRN Insomnia  oxyCODONE    5 mG/acetaminophen 325 mG 1 Tablet(s) Oral every 6 hours PRN Moderate Pain (4 - 6)    VITALS:   Vital Signs Last 24 Hrs  T(C): 37.6 (29 May 2018 05:49), Max: 37.6 (29 May 2018 05:49)  T(F): 99.6 (29 May 2018 05:49), Max: 99.6 (29 May 2018 05:49)  HR: 86 (29 May 2018 05:49) (86 - 94)  BP: 109/64 (29 May 2018 05:49) (103/58 - 120/67)  BP(mean): --  RR: 18 (29 May 2018 05:49) (18 - 20)  SpO2: --    LABS:                     7.1    3.17  )-----------( 127      ( 28 May 2018 19:06 )             21.9     05-28    135  |  96<L>  |  23<H>  ----------------------------<  209<H>  4.7   |  23  |  0.8    Ca    8.7      28 May 2018 19:06  Phos  3.9     05-28  Mg     1.8     05-28    PT/INR - ( 28 May 2018 19:06 )   PT: 13.50 sec;   INR: 1.24 ratio      PTT - ( 28 May 2018 19:06 )  PTT:26.1 sec    RADIOLOGY:    PHYSICAL EXAM:  GEN: No acute distress  LUNGS: Clear to auscultation bilaterally   HEART: S1/S2 present. RRR.   ABD: Soft, non-tender, non-distended. Bowel sounds present  EXT: NC/NC/NE/2+PP/RAJAN  NEURO: AAOX3

## 2018-05-30 ENCOUNTER — RESULT REVIEW (OUTPATIENT)
Age: 68
End: 2018-05-30

## 2018-05-30 LAB
ANION GAP SERPL CALC-SCNC: 13 MMOL/L — SIGNIFICANT CHANGE UP (ref 7–14)
BASOPHILS # BLD AUTO: 0 K/UL — SIGNIFICANT CHANGE UP (ref 0–0.2)
BASOPHILS NFR BLD AUTO: 0 % — SIGNIFICANT CHANGE UP (ref 0–1)
BUN SERPL-MCNC: 24 MG/DL — HIGH (ref 10–20)
CALCIUM SERPL-MCNC: 8.5 MG/DL — SIGNIFICANT CHANGE UP (ref 8.5–10.1)
CHLORIDE SERPL-SCNC: 100 MMOL/L — SIGNIFICANT CHANGE UP (ref 98–110)
CO2 SERPL-SCNC: 24 MMOL/L — SIGNIFICANT CHANGE UP (ref 17–32)
CREAT SERPL-MCNC: 0.6 MG/DL — LOW (ref 0.7–1.5)
EOSINOPHIL # BLD AUTO: 0.02 K/UL — SIGNIFICANT CHANGE UP (ref 0–0.7)
EOSINOPHIL NFR BLD AUTO: 0.7 % — SIGNIFICANT CHANGE UP (ref 0–8)
GLUCOSE SERPL-MCNC: 144 MG/DL — HIGH (ref 70–99)
HCT VFR BLD CALC: 22 % — LOW (ref 37–47)
HGB BLD-MCNC: 7.7 G/DL — LOW (ref 12–16)
HOMOCYSTEINE LEVEL: 9.7 UMOL/L — SIGNIFICANT CHANGE UP
IMM GRANULOCYTES NFR BLD AUTO: 13.7 % — HIGH (ref 0.1–0.3)
LYMPHOCYTES # BLD AUTO: 0.58 K/UL — LOW (ref 1.2–3.4)
LYMPHOCYTES # BLD AUTO: 19.9 % — LOW (ref 20.5–51.1)
MAGNESIUM SERPL-MCNC: 1.9 MG/DL — SIGNIFICANT CHANGE UP (ref 1.8–2.4)
MCHC RBC-ENTMCNC: 31 PG — SIGNIFICANT CHANGE UP (ref 27–31)
MCHC RBC-ENTMCNC: 35 G/DL — SIGNIFICANT CHANGE UP (ref 32–37)
MCV RBC AUTO: 88.7 FL — SIGNIFICANT CHANGE UP (ref 81–99)
METHYLMALONIC ACID LEVEL: 225 NMOL/L — SIGNIFICANT CHANGE UP (ref 87–318)
MONOCYTES # BLD AUTO: 0.22 K/UL — SIGNIFICANT CHANGE UP (ref 0.1–0.6)
MONOCYTES NFR BLD AUTO: 7.6 % — SIGNIFICANT CHANGE UP (ref 1.7–9.3)
NEUTROPHILS # BLD AUTO: 1.69 K/UL — SIGNIFICANT CHANGE UP (ref 1.4–6.5)
NEUTROPHILS NFR BLD AUTO: 58.1 % — SIGNIFICANT CHANGE UP (ref 42.2–75.2)
NRBC # BLD: 4 /100 WBCS — HIGH (ref 0–0)
PHOSPHATE SERPL-MCNC: 3.9 MG/DL — SIGNIFICANT CHANGE UP (ref 2.1–4.9)
PLATELET # BLD AUTO: 114 K/UL — LOW (ref 130–400)
POTASSIUM SERPL-MCNC: 4.6 MMOL/L — SIGNIFICANT CHANGE UP (ref 3.5–5)
POTASSIUM SERPL-SCNC: 4.6 MMOL/L — SIGNIFICANT CHANGE UP (ref 3.5–5)
RBC # BLD: 2.48 M/UL — LOW (ref 4.2–5.4)
RBC # FLD: 18.8 % — HIGH (ref 11.5–14.5)
SODIUM SERPL-SCNC: 137 MMOL/L — SIGNIFICANT CHANGE UP (ref 135–146)
WBC # BLD: 2.91 K/UL — LOW (ref 4.8–10.8)
WBC # FLD AUTO: 2.91 K/UL — LOW (ref 4.8–10.8)

## 2018-05-30 PROCEDURE — 38740 REMOVE ARMPIT LYMPH NODES: CPT

## 2018-05-30 PROCEDURE — 99232 SBSQ HOSP IP/OBS MODERATE 35: CPT | Mod: 57

## 2018-05-30 RX ORDER — OXYCODONE AND ACETAMINOPHEN 5; 325 MG/1; MG/1
1 TABLET ORAL EVERY 4 HOURS
Qty: 0 | Refills: 0 | Status: DISCONTINUED | OUTPATIENT
Start: 2018-05-30 | End: 2018-05-30

## 2018-05-30 RX ORDER — MORPHINE SULFATE 50 MG/1
2 CAPSULE, EXTENDED RELEASE ORAL
Qty: 0 | Refills: 0 | Status: DISCONTINUED | OUTPATIENT
Start: 2018-05-30 | End: 2018-05-30

## 2018-05-30 RX ORDER — MORPHINE SULFATE 50 MG/1
1 CAPSULE, EXTENDED RELEASE ORAL EVERY 4 HOURS
Qty: 0 | Refills: 0 | Status: DISCONTINUED | OUTPATIENT
Start: 2018-05-30 | End: 2018-06-04

## 2018-05-30 RX ORDER — VANCOMYCIN HCL 1 G
1000 VIAL (EA) INTRAVENOUS ONCE
Qty: 0 | Refills: 0 | Status: COMPLETED | OUTPATIENT
Start: 2018-05-30 | End: 2018-05-30

## 2018-05-30 RX ORDER — SODIUM CHLORIDE 9 MG/ML
1000 INJECTION INTRAMUSCULAR; INTRAVENOUS; SUBCUTANEOUS
Qty: 0 | Refills: 0 | Status: DISCONTINUED | OUTPATIENT
Start: 2018-05-30 | End: 2018-05-30

## 2018-05-30 RX ADMIN — OXYCODONE AND ACETAMINOPHEN 1 TABLET(S): 5; 325 TABLET ORAL at 16:58

## 2018-05-30 RX ADMIN — AMPICILLIN SODIUM AND SULBACTAM SODIUM 100 GRAM(S): 250; 125 INJECTION, POWDER, FOR SUSPENSION INTRAMUSCULAR; INTRAVENOUS at 19:27

## 2018-05-30 RX ADMIN — AMPICILLIN SODIUM AND SULBACTAM SODIUM 100 GRAM(S): 250; 125 INJECTION, POWDER, FOR SUSPENSION INTRAMUSCULAR; INTRAVENOUS at 05:26

## 2018-05-30 RX ADMIN — AMPICILLIN SODIUM AND SULBACTAM SODIUM 100 GRAM(S): 250; 125 INJECTION, POWDER, FOR SUSPENSION INTRAMUSCULAR; INTRAVENOUS at 02:45

## 2018-05-30 RX ADMIN — PANTOPRAZOLE SODIUM 40 MILLIGRAM(S): 20 TABLET, DELAYED RELEASE ORAL at 19:27

## 2018-05-30 RX ADMIN — NYSTATIN CREAM 1 APPLICATION(S): 100000 CREAM TOPICAL at 05:26

## 2018-05-30 RX ADMIN — MORPHINE SULFATE 1 MILLIGRAM(S): 50 CAPSULE, EXTENDED RELEASE ORAL at 20:25

## 2018-05-30 RX ADMIN — NYSTATIN CREAM 1 APPLICATION(S): 100000 CREAM TOPICAL at 19:26

## 2018-05-30 RX ADMIN — BUDESONIDE AND FORMOTEROL FUMARATE DIHYDRATE 2 PUFF(S): 160; 4.5 AEROSOL RESPIRATORY (INHALATION) at 08:05

## 2018-05-30 RX ADMIN — SODIUM CHLORIDE 75 MILLILITER(S): 9 INJECTION INTRAMUSCULAR; INTRAVENOUS; SUBCUTANEOUS at 16:08

## 2018-05-30 RX ADMIN — MORPHINE SULFATE 1 MILLIGRAM(S): 50 CAPSULE, EXTENDED RELEASE ORAL at 20:55

## 2018-05-30 RX ADMIN — Medication 250 MILLIGRAM(S): at 10:53

## 2018-05-30 RX ADMIN — PANTOPRAZOLE SODIUM 40 MILLIGRAM(S): 20 TABLET, DELAYED RELEASE ORAL at 05:26

## 2018-05-30 RX ADMIN — BUDESONIDE AND FORMOTEROL FUMARATE DIHYDRATE 2 PUFF(S): 160; 4.5 AEROSOL RESPIRATORY (INHALATION) at 20:03

## 2018-05-30 RX ADMIN — ANASTROZOLE 1 MILLIGRAM(S): 1 TABLET ORAL at 11:20

## 2018-05-30 RX ADMIN — OXYCODONE AND ACETAMINOPHEN 1 TABLET(S): 5; 325 TABLET ORAL at 16:28

## 2018-05-30 NOTE — PRE-ANESTHESIA EVALUATION ADULT - NSANTHOSAYNRD_GEN_A_CORE
No. CRISTINO screening performed.  STOP BANG Legend: 0-2 = LOW Risk; 3-4 = INTERMEDIATE Risk; 5-8 = HIGH Risk
No. CRISTINO screening performed.  STOP BANG Legend: 0-2 = LOW Risk; 3-4 = INTERMEDIATE Risk; 5-8 = HIGH Risk

## 2018-05-30 NOTE — PROGRESS NOTE ADULT - ASSESSMENT
68 yo female patient with PMHx of right breast cancer (Well to moderately differentiated invasive ductal carcinoma ER+, [place]+, Her-2 neg, stage T1c, N0, M0) s/p lumpectomy around 1.5 years ago, followed by radiation therapy and currently maintained on anastrazole, history of lower GI bleeding back in January 2018 which was attributed to hemorrhoids, s/p hemorrhoidectomy and fissurotomy, currently presented because of worsening generalized weakness, dyspnea on exertion and cough. Patient in fact has multiple complaints: 1- She complains of few days of productive cough, clear phlegm, associated with left armpit pain, radiating down her left side, worse with coughing and with deep inspiration. Patient is a former smoker of around 50 pack year, stopped 4 months ago. Denies sore throat, rhinorrhea, fever...2- Patient is also having generalized weakness and exertional shortness of breath, getting worse over the last month or so. 3- Patient is complaining of intermittent headaches recently  In ED, patient was found to have severe anemia (Hg 5.8) and positive guaiac, admitted for evaluation. (21 May 2018 16:39)    #) Symptomatic anemia with pancytopenia  -pt has elevated LDH, high retic count, positive warm antibodies,  -has history of lower GI bleeding few months ago, and was found to have positive guaiac in ED, no active bleed now  -gastritis on EGD  -keep Hb> 7  -s/p 3 transfusion since admission  -started on BID protonix, f/u Bx with GI  -pancytopenia w/u: LNs in axilla and abdomen suspicious for lymphoma, will need bx by surgery ( Dr Isaac will perform it on Wednesday 5/30)  -f/u flow cytometry result  -GI following (Dr. Martinez)- capsule endoscopy held since the CT scan showed suspicion of lymphoma so hemonc work up is currently more imminent for the patient especially that she does not have any signs of active GI bleed.   -Heme/Onc following (Dr. Miller)- Surgery evaluation for axillary LN biopsy  -Sx following (Dr. Thomas)- LN bx today (05/30/18), recs to start unasyn 2/2 IV infiltration  -transfused 2 units pRBC on 05/29/18  -on unasyn (started 05/29)    #) Cough and exertional shortness of breath  -resolved SOB, cough improved  -Patient might have non diagnosed COPD (chronic smoking 50 pack year, stopped only 4 months ago)  -possible acute bronchitis / viral upper resp tract infection    #) History of buttock skin lesion s/p excision during hemorrhoidectomy surgery  -Pathology showing malignancy of undetermined origin, clean margins of excision  -Needs to follow up with oncology and address this issue too    #) Breast cancer s/p lumpectomy and currently on anastrozole  -c/w anastrazole    #) DVT/ GI ppx  -encourage ambulation, sequentials/ protonix    #) Code Status  -full code    #) Dispo  -from home 68 yo female patient with PMHx of right breast cancer (Well to moderately differentiated invasive ductal carcinoma ER+, [place]+, Her-2 neg, stage T1c, N0, M0) s/p lumpectomy around 1.5 years ago, followed by radiation therapy and currently maintained on anastrazole, history of lower GI bleeding back in January 2018 which was attributed to hemorrhoids, s/p hemorrhoidectomy and fissurotomy, currently presented because of worsening generalized weakness, dyspnea on exertion and cough. Patient in fact has multiple complaints: 1- She complains of few days of productive cough, clear phlegm, associated with left armpit pain, radiating down her left side, worse with coughing and with deep inspiration. Patient is a former smoker of around 50 pack year, stopped 4 months ago. Denies sore throat, rhinorrhea, fever...2- Patient is also having generalized weakness and exertional shortness of breath, getting worse over the last month or so. 3- Patient is complaining of intermittent headaches recently  In ED, patient was found to have severe anemia (Hg 5.8) and positive guaiac, admitted for evaluation. (21 May 2018 16:39)    #) Symptomatic anemia with pancytopenia  -pt has elevated LDH, high retic count, positive warm antibodies,  -has history of lower GI bleeding few months ago, and was found to have positive guaiac in ED, no active bleed now  -gastritis on EGD  -keep Hb> 7  -s/p 3 transfusion since admission  -started on BID protonix, f/u Bx with GI  -pancytopenia w/u: LNs in axilla and abdomen suspicious for lymphoma, will need bx by surgery ( Dr Isaac will perform it on Wednesday 5/30)  -f/u flow cytometry result  -GI following (Dr. Martinez)- capsule endoscopy held since the CT scan showed suspicion of lymphoma so hemonc work up is currently more imminent for the patient especially that she does not have any signs of active GI bleed.   -Heme/Onc following (Dr. Miller)- Surgery evaluation for axillary LN biopsy  -Sx following (Dr. Thomas)- LN bx today at [facility] and then will return right after (05/30/18), recs to start unasyn 2/2 IV infiltration  -transfused 2 units pRBC on 05/29/18  -on unasyn (started 05/29)    #) Cough and exertional shortness of breath  -resolved SOB, cough improved  -Patient might have non diagnosed COPD (chronic smoking 50 pack year, stopped only 4 months ago)  -possible acute bronchitis / viral upper resp tract infection    #) History of buttock skin lesion s/p excision during hemorrhoidectomy surgery  -Pathology showing malignancy of undetermined origin, clean margins of excision  -Needs to follow up with oncology and address this issue too    #) Breast cancer s/p lumpectomy and currently on anastrozole  -c/w anastrazole    #) DVT/ GI ppx  -encourage ambulation, sequentials/ protonix    #) Code Status  -full code    #) Dispo  -from home

## 2018-05-30 NOTE — PROGRESS NOTE ADULT - SUBJECTIVE AND OBJECTIVE BOX
SUBJECTIVE:    Patient is a 67y old Female who presents with a chief complaint of cough, dyspnea on exertion, generalized weakness (21 May 2018 16:39)    Currently admitted to medicine with the primary diagnosis of Anemia    PAST MEDICAL & SURGICAL HISTORY  Gastrointestinal bleeding, lower: In january 2018  Breast CA  History of total left hip replacement  Status post right breast lumpectomy    SOCIAL HISTORY:  Negative for smoking/alcohol/drug use.     ALLERGIES:  No Known Allergies    MEDICATIONS:  MEDICATIONS  (STANDING):  ALBUTerol    0.083% 2.5 milliGRAM(s) Nebulizer every 6 hours  ampicillin/sulbactam  IVPB      ampicillin/sulbactam  IVPB 1.5 Gram(s) IV Intermittent every 6 hours  anastrozole 1 milliGRAM(s) Oral daily  buDESOnide 160 MICROgram(s)/formoterol 4.5 MICROgram(s) Inhaler 2 Puff(s) Inhalation two times a day  nicotine -  14 mG/24Hr(s) Patch 1 patch Transdermal daily  nystatin Powder 1 Application(s) Topical two times a day  pantoprazole    Tablet 40 milliGRAM(s) Oral two times a day  sodium chloride 0.9%. 1000 milliLiter(s) (75 mL/Hr) IV Continuous <Continuous>    MEDICATIONS  (PRN):  acetaminophen   Tablet 650 milliGRAM(s) Oral every 6 hours PRN For Temp greater than 38 C (100.4 F)  guaiFENesin    Syrup 100 milliGRAM(s) Oral every 6 hours PRN Cough  melatonin 5 milliGRAM(s) Oral once PRN Insomnia    VITALS:   Vital Signs Last 24 Hrs  T(C): 37.6 (30 May 2018 05:54), Max: 38.5 (29 May 2018 21:47)  T(F): 99.6 (30 May 2018 05:54), Max: 101.3 (29 May 2018 21:47)  HR: 88 (30 May 2018 05:54) (72 - 96)  BP: 120/58 (30 May 2018 05:54) (92/50 - 120/58)  BP(mean): --  RR: 18 (30 May 2018 05:54) (16 - 20)  SpO2: 97% (29 May 2018 21:47) (97% - 97%)    LABS:                             5.8    2.60  )-----------( 107      ( 29 May 2018 09:25 )             17.8     05-29    141  |  107  |  17  ----------------------------<  94  3.4<L>   |  22  |  0.5<L>    Ca    7.3<L>      29 May 2018 09:25  Phos  3.9     05-28  Mg     1.7     05-29    PT/INR - ( 29 May 2018 09:25 )   PT: 14.10 sec;   INR: 1.30 ratio      PTT - ( 29 May 2018 09:25 )  PTT:25.9 sec    RADIOLOGY:    PHYSICAL EXAM:  GEN: No acute distress  LUNGS: Clear to auscultation bilaterally   HEART: S1/S2 present. RRR.   ABD: Soft, non-tender, non-distended. Bowel sounds present  EXT: NC/NC/NE/2+PP/RAJAN  NEURO: AAOX3

## 2018-05-30 NOTE — PROGRESS NOTE ADULT - SUBJECTIVE AND OBJECTIVE BOX
GENERAL SURGERY Progress Note    Plan for axillary lymphadenectomy today    24 hour events: got PRBC transfusion yesterday for hb of 5.8, preopped consented.     PAST MEDICAL & SURGICAL HISTORY:  Gastrointestinal bleeding, lower: In january 2018  Breast CA  History of total left hip replacement  Status post right breast lumpectomy    MEDICATIONS  (STANDING):  ALBUTerol    0.083% 2.5 milliGRAM(s) Nebulizer every 6 hours  ampicillin/sulbactam  IVPB      ampicillin/sulbactam  IVPB 1.5 Gram(s) IV Intermittent every 6 hours  anastrozole 1 milliGRAM(s) Oral daily  buDESOnide 160 MICROgram(s)/formoterol 4.5 MICROgram(s) Inhaler 2 Puff(s) Inhalation two times a day  nicotine -  14 mG/24Hr(s) Patch 1 patch Transdermal daily  nystatin Powder 1 Application(s) Topical two times a day  pantoprazole    Tablet 40 milliGRAM(s) Oral two times a day  sodium chloride 0.9%. 1000 milliLiter(s) (75 mL/Hr) IV Continuous <Continuous>    MEDICATIONS  (PRN):  acetaminophen   Tablet 650 milliGRAM(s) Oral every 6 hours PRN For Temp greater than 38 C (100.4 F)  guaiFENesin    Syrup 100 milliGRAM(s) Oral every 6 hours PRN Cough  melatonin 5 milliGRAM(s) Oral once PRN Insomnia      I&O's Detail        Vital Signs Last 24 Hrs  T(C): 37.6 (30 May 2018 05:54), Max: 38.5 (29 May 2018 21:47)  T(F): 99.6 (30 May 2018 05:54), Max: 101.3 (29 May 2018 21:47)  HR: 88 (30 May 2018 05:54) (72 - 96)  BP: 120/58 (30 May 2018 05:54) (92/50 - 120/58)  BP(mean): --  RR: 18 (30 May 2018 05:54) (16 - 20)  SpO2: 97% (29 May 2018 21:47) (97% - 97%)  Physical Exam:  General: pallor  HEENT: no icterus  Lungs:b/l clear  Heart: s1/s2 normal  Abd: soft, non tender  Neuro: conscious, oriented  Extremities: b/l palpable pulses  Skin: moist    LABS:    CBC Full  -  ( 29 May 2018 09:25 )  WBC Count : 2.60 K/uL  Hemoglobin : 5.8 g/dL  Hematocrit : 17.8 %  Platelet Count - Automated : 107 K/uL  Mean Cell Volume : 91.3 fL  Mean Cell Hemoglobin : 29.7 pg  Mean Cell Hemoglobin Concentration : 32.6 g/dL  Auto Neutrophil # : x  Auto Lymphocyte # : x  Auto Monocyte # : x  Auto Eosinophil # : x  Auto Basophil # : x  Auto Neutrophil % : x  Auto Lymphocyte % : x  Auto Monocyte % : x  Auto Eosinophil % : x  Auto Basophil % : x    05-29    141  |  107  |  17  ----------------------------<  94  3.4<L>   |  22  |  0.5<L>    Ca    7.3<L>      29 May 2018 09:25  Phos  3.9     05-28  Mg     1.7     05-29      PT/INR - ( 29 May 2018 09:25 )   PT: 14.10 sec;   INR: 1.30 ratio         PTT - ( 29 May 2018 09:25 )  PTT:25.9 sec            RADIOLOGY & ADDITIONAL STUDIES:

## 2018-05-30 NOTE — PRE-ANESTHESIA EVALUATION ADULT - NSANTHPMHFT_GEN_ALL_CORE
Pt found to be jaundiced in preop area D/W Dr Ocampo who subsequently spoke with Dr Larson and she wanted to proceed assuming it is mild hemolysis and the tissue diagnosis is important for the patient management.

## 2018-05-30 NOTE — BRIEF OPERATIVE NOTE - PROCEDURE
<<-----Click on this checkbox to enter Procedure Biopsy of axillary node  05/30/2018  left side  Active  FELABBASY

## 2018-05-30 NOTE — PACU DISCHARGE NOTE - COMMENTS
Pt will be transferred back to her Room at the [facility] via Ambulance when Dc criteria are met

## 2018-05-30 NOTE — PROGRESS NOTE ADULT - ASSESSMENT
24 hour events: got PRBC transfusion yesterday for hb of 5.8, preopped consented, follow up AM labs.

## 2018-05-30 NOTE — PRE-ANESTHESIA EVALUATION ADULT - MALLAMPATI CLASS
Class II - visualization of the soft palate, fauces, and uvula

## 2018-05-31 LAB
ALBUMIN SERPL ELPH-MCNC: 2.9 G/DL — LOW (ref 3.5–5.2)
ALBUMIN SERPL ELPH-MCNC: 3.1 G/DL — LOW (ref 3.5–5.2)
ALBUMIN SERPL ELPH-MCNC: 3.3 G/DL — LOW (ref 3.5–5.2)
ALLERGY+IMMUNOLOGY DIAG STUDY NOTE: SIGNIFICANT CHANGE UP
ALP SERPL-CCNC: 330 U/L — HIGH (ref 30–115)
ALP SERPL-CCNC: 353 U/L — HIGH (ref 30–115)
ALP SERPL-CCNC: 405 U/L — HIGH (ref 30–115)
ALT FLD-CCNC: 52 U/L — HIGH (ref 0–41)
ALT FLD-CCNC: 52 U/L — HIGH (ref 0–41)
ALT FLD-CCNC: 53 U/L — HIGH (ref 0–41)
ANION GAP SERPL CALC-SCNC: 12 MMOL/L — SIGNIFICANT CHANGE UP (ref 7–14)
ANTIBODY INTERPRETATION 2: SIGNIFICANT CHANGE UP
ANTIBODY INTERPRETATION 3: SIGNIFICANT CHANGE UP
APTT BLD: 24.8 SEC — LOW (ref 27–39.2)
AST SERPL-CCNC: 42 U/L — HIGH (ref 0–41)
AST SERPL-CCNC: 44 U/L — HIGH (ref 0–41)
AST SERPL-CCNC: 46 U/L — HIGH (ref 0–41)
BASOPHILS # BLD AUTO: 0 K/UL — SIGNIFICANT CHANGE UP (ref 0–0.2)
BASOPHILS NFR BLD AUTO: 0 % — SIGNIFICANT CHANGE UP (ref 0–1)
BILIRUB DIRECT SERPL-MCNC: 3.4 MG/DL — HIGH (ref 0–0.2)
BILIRUB DIRECT SERPL-MCNC: 3.4 MG/DL — HIGH (ref 0–0.2)
BILIRUB DIRECT SERPL-MCNC: 9.4 MG/DL — HIGH (ref 0–0.2)
BILIRUB INDIRECT FLD-MCNC: 1.6 MG/DL — HIGH (ref 0.2–1.2)
BILIRUB INDIRECT FLD-MCNC: 2.1 MG/DL — HIGH (ref 0.2–1.2)
BILIRUB INDIRECT FLD-MCNC: 3.9 MG/DL — HIGH (ref 0.2–1.2)
BILIRUB SERPL-MCNC: 13.3 MG/DL — HIGH (ref 0.2–1.2)
BILIRUB SERPL-MCNC: 5 MG/DL — HIGH (ref 0.2–1.2)
BILIRUB SERPL-MCNC: 5.5 MG/DL — HIGH (ref 0.2–1.2)
BLD GP AB SCN SERPL QL: (no result)
BUN SERPL-MCNC: 18 MG/DL — SIGNIFICANT CHANGE UP (ref 10–20)
CALCIUM SERPL-MCNC: 7.9 MG/DL — LOW (ref 8.5–10.1)
CHLORIDE SERPL-SCNC: 102 MMOL/L — SIGNIFICANT CHANGE UP (ref 98–110)
CO2 SERPL-SCNC: 23 MMOL/L — SIGNIFICANT CHANGE UP (ref 17–32)
CREAT SERPL-MCNC: 0.7 MG/DL — SIGNIFICANT CHANGE UP (ref 0.7–1.5)
EOSINOPHIL # BLD AUTO: 0.02 K/UL — SIGNIFICANT CHANGE UP (ref 0–0.7)
EOSINOPHIL NFR BLD AUTO: 0.9 % — SIGNIFICANT CHANGE UP (ref 0–8)
GLUCOSE SERPL-MCNC: 113 MG/DL — HIGH (ref 70–99)
HCT VFR BLD CALC: 16.8 % — LOW (ref 37–47)
HCT VFR BLD CALC: 19.6 % — LOW (ref 37–47)
HCT VFR BLD CALC: 21.5 % — LOW (ref 37–47)
HGB BLD-MCNC: 5.5 G/DL — CRITICAL LOW (ref 12–16)
HGB BLD-MCNC: 6.5 G/DL — CRITICAL LOW (ref 12–16)
HGB BLD-MCNC: 7.4 G/DL — CRITICAL LOW (ref 12–16)
IMM GRANULOCYTES NFR BLD AUTO: 9.6 % — HIGH (ref 0.1–0.3)
INR BLD: 1.18 RATIO — SIGNIFICANT CHANGE UP (ref 0.65–1.3)
LDH SERPL L TO P-CCNC: 699 — HIGH (ref 50–242)
LDH SERPL L TO P-CCNC: 758 — HIGH (ref 50–242)
LYMPHOCYTES # BLD AUTO: 0.53 K/UL — LOW (ref 1.2–3.4)
LYMPHOCYTES # BLD AUTO: 23 % — SIGNIFICANT CHANGE UP (ref 20.5–51.1)
MAGNESIUM SERPL-MCNC: 1.8 MG/DL — SIGNIFICANT CHANGE UP (ref 1.8–2.4)
MCHC RBC-ENTMCNC: 29.3 PG — SIGNIFICANT CHANGE UP (ref 27–31)
MCHC RBC-ENTMCNC: 29.4 PG — SIGNIFICANT CHANGE UP (ref 27–31)
MCHC RBC-ENTMCNC: 30.7 PG — SIGNIFICANT CHANGE UP (ref 27–31)
MCHC RBC-ENTMCNC: 32.7 G/DL — SIGNIFICANT CHANGE UP (ref 32–37)
MCHC RBC-ENTMCNC: 33.2 G/DL — SIGNIFICANT CHANGE UP (ref 32–37)
MCHC RBC-ENTMCNC: 34.4 G/DL — SIGNIFICANT CHANGE UP (ref 32–37)
MCV RBC AUTO: 88.7 FL — SIGNIFICANT CHANGE UP (ref 81–99)
MCV RBC AUTO: 89.2 FL — SIGNIFICANT CHANGE UP (ref 81–99)
MCV RBC AUTO: 89.4 FL — SIGNIFICANT CHANGE UP (ref 81–99)
MONOCYTES # BLD AUTO: 0.17 K/UL — SIGNIFICANT CHANGE UP (ref 0.1–0.6)
MONOCYTES NFR BLD AUTO: 7.4 % — SIGNIFICANT CHANGE UP (ref 1.7–9.3)
NEUTROPHILS # BLD AUTO: 1.36 K/UL — LOW (ref 1.4–6.5)
NEUTROPHILS NFR BLD AUTO: 59.1 % — SIGNIFICANT CHANGE UP (ref 42.2–75.2)
NRBC # BLD: 1 /100 WBCS — HIGH (ref 0–0)
NRBC # BLD: 3 /100 WBCS — HIGH (ref 0–0)
PLATELET # BLD AUTO: 109 K/UL — LOW (ref 130–400)
PLATELET # BLD AUTO: 119 K/UL — LOW (ref 130–400)
PLATELET # BLD AUTO: 122 K/UL — LOW (ref 130–400)
POTASSIUM SERPL-MCNC: 4.1 MMOL/L — SIGNIFICANT CHANGE UP (ref 3.5–5)
POTASSIUM SERPL-SCNC: 4.1 MMOL/L — SIGNIFICANT CHANGE UP (ref 3.5–5)
PROT SERPL-MCNC: 6.3 G/DL — SIGNIFICANT CHANGE UP (ref 6–8)
PROT SERPL-MCNC: 6.8 G/DL — SIGNIFICANT CHANGE UP (ref 6–8)
PROT SERPL-MCNC: 6.8 G/DL — SIGNIFICANT CHANGE UP (ref 6–8)
PROTHROM AB SERPL-ACNC: 12.8 SEC — SIGNIFICANT CHANGE UP (ref 9.95–12.87)
RBC # BLD: 1.88 M/UL — LOW (ref 4.2–5.4)
RBC # BLD: 2.21 M/UL — LOW (ref 4.2–5.4)
RBC # BLD: 2.21 M/UL — LOW (ref 4.2–5.4)
RBC # BLD: 2.41 M/UL — LOW (ref 4.2–5.4)
RBC # BLD: 2.47 M/UL — LOW (ref 4.2–5.4)
RBC # FLD: 18.6 % — HIGH (ref 11.5–14.5)
RBC # FLD: 19.3 % — HIGH (ref 11.5–14.5)
RBC # FLD: 19.4 % — HIGH (ref 11.5–14.5)
RETICS #: 44.7 K/UL — SIGNIFICANT CHANGE UP (ref 25–125)
RETICS #: 48.6 K/UL — SIGNIFICANT CHANGE UP (ref 25–125)
RETICS/RBC NFR: 1.8 % — HIGH (ref 0.5–1.5)
RETICS/RBC NFR: 2.2 % — HIGH (ref 0.5–1.5)
SODIUM SERPL-SCNC: 137 MMOL/L — SIGNIFICANT CHANGE UP (ref 135–146)
TYPE + AB SCN PNL BLD: SIGNIFICANT CHANGE UP
WBC # BLD: 2.3 K/UL — LOW (ref 4.8–10.8)
WBC # BLD: 2.66 K/UL — LOW (ref 4.8–10.8)
WBC # BLD: 3.56 K/UL — LOW (ref 4.8–10.8)
WBC # FLD AUTO: 2.3 K/UL — LOW (ref 4.8–10.8)
WBC # FLD AUTO: 2.66 K/UL — LOW (ref 4.8–10.8)
WBC # FLD AUTO: 3.56 K/UL — LOW (ref 4.8–10.8)

## 2018-05-31 RX ORDER — DIPHENHYDRAMINE HCL 50 MG
50 CAPSULE ORAL ONCE
Qty: 0 | Refills: 0 | Status: COMPLETED | OUTPATIENT
Start: 2018-05-31 | End: 2018-05-31

## 2018-05-31 RX ORDER — OXYCODONE HYDROCHLORIDE 5 MG/1
5 TABLET ORAL THREE TIMES A DAY
Qty: 0 | Refills: 0 | Status: DISCONTINUED | OUTPATIENT
Start: 2018-05-31 | End: 2018-06-07

## 2018-05-31 RX ORDER — CEPHALEXIN 500 MG
500 CAPSULE ORAL THREE TIMES A DAY
Qty: 0 | Refills: 0 | Status: DISCONTINUED | OUTPATIENT
Start: 2018-05-31 | End: 2018-06-02

## 2018-05-31 RX ADMIN — SODIUM CHLORIDE 75 MILLILITER(S): 9 INJECTION INTRAMUSCULAR; INTRAVENOUS; SUBCUTANEOUS at 18:35

## 2018-05-31 RX ADMIN — Medication 50 MILLIGRAM(S): at 10:24

## 2018-05-31 RX ADMIN — ANASTROZOLE 1 MILLIGRAM(S): 1 TABLET ORAL at 12:56

## 2018-05-31 RX ADMIN — OXYCODONE HYDROCHLORIDE 5 MILLIGRAM(S): 5 TABLET ORAL at 10:17

## 2018-05-31 RX ADMIN — ALBUTEROL 2.5 MILLIGRAM(S): 90 AEROSOL, METERED ORAL at 20:14

## 2018-05-31 RX ADMIN — NYSTATIN CREAM 1 APPLICATION(S): 100000 CREAM TOPICAL at 17:24

## 2018-05-31 RX ADMIN — OXYCODONE HYDROCHLORIDE 5 MILLIGRAM(S): 5 TABLET ORAL at 09:47

## 2018-05-31 RX ADMIN — AMPICILLIN SODIUM AND SULBACTAM SODIUM 100 GRAM(S): 250; 125 INJECTION, POWDER, FOR SUSPENSION INTRAMUSCULAR; INTRAVENOUS at 05:12

## 2018-05-31 RX ADMIN — MORPHINE SULFATE 1 MILLIGRAM(S): 50 CAPSULE, EXTENDED RELEASE ORAL at 00:51

## 2018-05-31 RX ADMIN — MORPHINE SULFATE 1 MILLIGRAM(S): 50 CAPSULE, EXTENDED RELEASE ORAL at 00:21

## 2018-05-31 RX ADMIN — BUDESONIDE AND FORMOTEROL FUMARATE DIHYDRATE 2 PUFF(S): 160; 4.5 AEROSOL RESPIRATORY (INHALATION) at 21:45

## 2018-05-31 RX ADMIN — ALBUTEROL 2.5 MILLIGRAM(S): 90 AEROSOL, METERED ORAL at 13:12

## 2018-05-31 RX ADMIN — Medication 500 MILLIGRAM(S): at 17:23

## 2018-05-31 RX ADMIN — AMPICILLIN SODIUM AND SULBACTAM SODIUM 100 GRAM(S): 250; 125 INJECTION, POWDER, FOR SUSPENSION INTRAMUSCULAR; INTRAVENOUS at 00:16

## 2018-05-31 RX ADMIN — Medication 500 MILLIGRAM(S): at 21:36

## 2018-05-31 RX ADMIN — PANTOPRAZOLE SODIUM 40 MILLIGRAM(S): 20 TABLET, DELAYED RELEASE ORAL at 05:11

## 2018-05-31 RX ADMIN — SODIUM CHLORIDE 75 MILLILITER(S): 9 INJECTION INTRAMUSCULAR; INTRAVENOUS; SUBCUTANEOUS at 00:22

## 2018-05-31 RX ADMIN — OXYCODONE HYDROCHLORIDE 5 MILLIGRAM(S): 5 TABLET ORAL at 21:17

## 2018-05-31 RX ADMIN — Medication 650 MILLIGRAM(S): at 10:13

## 2018-05-31 RX ADMIN — PANTOPRAZOLE SODIUM 40 MILLIGRAM(S): 20 TABLET, DELAYED RELEASE ORAL at 17:24

## 2018-05-31 RX ADMIN — OXYCODONE HYDROCHLORIDE 5 MILLIGRAM(S): 5 TABLET ORAL at 20:42

## 2018-05-31 RX ADMIN — Medication 40 MILLIGRAM(S): at 10:24

## 2018-05-31 RX ADMIN — NYSTATIN CREAM 1 APPLICATION(S): 100000 CREAM TOPICAL at 05:12

## 2018-05-31 NOTE — CONSULT NOTE ADULT - CONSULT REASON
Axillary lymphadenopathy
Bone marrow biopsy
Pancytopenia, axillary lymphadenopathy and evidence of malignancy on hemorrhoidectomy pathology
phlebitis
acute blood loss anemia

## 2018-05-31 NOTE — PROGRESS NOTE ADULT - SUBJECTIVE AND OBJECTIVE BOX
Pt seen and examined at bedside. No fresh complaints.    Vital Signs Last 24 Hrs  T(C): 36.3 (31 May 2018 06:04), Max: 37.7 (30 May 2018 11:19)  T(F): 97.4 (31 May 2018 06:04), Max: 99.9 (30 May 2018 11:19)  HR: 90 (31 May 2018 06:04) (79 - 90)  BP: 120/59 (31 May 2018 06:04) (100/52 - 145/105)  BP(mean): --  RR: 18 (31 May 2018 06:04) (16 - 18)  SpO2: 96% (30 May 2018 18:15) (95% - 97%)    MEDICATIONS  (STANDING):  ALBUTerol    0.083% 2.5 milliGRAM(s) Nebulizer every 6 hours  ampicillin/sulbactam  IVPB      ampicillin/sulbactam  IVPB 1.5 Gram(s) IV Intermittent every 6 hours  anastrozole 1 milliGRAM(s) Oral daily  buDESOnide 160 MICROgram(s)/formoterol 4.5 MICROgram(s) Inhaler 2 Puff(s) Inhalation two times a day  nicotine -  14 mG/24Hr(s) Patch 1 patch Transdermal daily  nystatin Powder 1 Application(s) Topical two times a day  pantoprazole    Tablet 40 milliGRAM(s) Oral two times a day  sodium chloride 0.9%. 1000 milliLiter(s) (75 mL/Hr) IV Continuous <Continuous>    MEDICATIONS  (PRN):  acetaminophen   Tablet 650 milliGRAM(s) Oral every 6 hours PRN For Temp greater than 38 C (100.4 F)  guaiFENesin    Syrup 100 milliGRAM(s) Oral every 6 hours PRN Cough  melatonin 5 milliGRAM(s) Oral once PRN Insomnia  morphine  - Injectable 1 milliGRAM(s) IV Push every 4 hours PRN Moderate Pain (4 - 6)  oxyCODONE    IR 5 milliGRAM(s) Oral three times a day PRN Moderate Pain (4 - 6)    Labs:                        6.5    2.66  )-----------( 122      ( 31 May 2018 09:19 )             19.6             05-31    137  |  102  |  18  ----------------------------<  113<H>  4.1   |  23  |  0.7    Ca    7.9<L>      31 May 2018 06:58  Phos  3.9     05-30  Mg     1.8     05-31    TPro  6.8  /  Alb  3.1<L>  /  TBili  5.5<H>  /  DBili  3.4<H>  /  AST  44<H>  /  ALT  52<H>  /  AlkPhos  353<H>  05-31    LIVER FUNCTIONS - ( 31 May 2018 09:19 )  Alb: 3.1 g/dL / Pro: 6.8 g/dL / ALK PHOS: 353 U/L / ALT: 52 U/L / AST: 44 U/L / GGT: x                 PT/INR - ( 31 May 2018 09:19 )   PT: 12.80 sec;   INR: 1.18 ratio         PTT - ( 31 May 2018 09:19 )  PTT:24.8 sec    Lactate Dehydrogenase, Serum (05.31.18 @ 09:19)    Lactate Dehydrogenase, Serum: 699    Reticulocyte Count (05.31.18 @ 09:19)    RBC Count: 2.21 M/uL    Reticulocyte Percent: 2.2 %    Absolute Reticulocytes: 48.6 K/uL

## 2018-05-31 NOTE — PROGRESS NOTE ADULT - SUBJECTIVE AND OBJECTIVE BOX
SUBJECTIVE:    Patient is a 67y old Female who presents with a chief complaint of cough, dyspnea on exertion, generalized weakness (21 May 2018 16:39)    Currently admitted to medicine with the primary diagnosis of Anemia     Today is hospital day 10d. This morning she is resting comfortably in bed and reports no new issues or overnight events.     PAST MEDICAL & SURGICAL HISTORY  Gastrointestinal bleeding, lower: In january 2018  Breast CA  History of total left hip replacement  Status post right breast lumpectomy    SOCIAL HISTORY:  Negative for smoking/alcohol/drug use.     ALLERGIES:  No Known Allergies    MEDICATIONS:  STANDING MEDICATIONS  ALBUTerol    0.083% 2.5 milliGRAM(s) Nebulizer every 6 hours  ampicillin/sulbactam  IVPB      ampicillin/sulbactam  IVPB 1.5 Gram(s) IV Intermittent every 6 hours  anastrozole 1 milliGRAM(s) Oral daily  buDESOnide 160 MICROgram(s)/formoterol 4.5 MICROgram(s) Inhaler 2 Puff(s) Inhalation two times a day  nicotine -  14 mG/24Hr(s) Patch 1 patch Transdermal daily  nystatin Powder 1 Application(s) Topical two times a day  pantoprazole    Tablet 40 milliGRAM(s) Oral two times a day  sodium chloride 0.9%. 1000 milliLiter(s) IV Continuous <Continuous>    PRN MEDICATIONS  acetaminophen   Tablet 650 milliGRAM(s) Oral every 6 hours PRN  guaiFENesin    Syrup 100 milliGRAM(s) Oral every 6 hours PRN  melatonin 5 milliGRAM(s) Oral once PRN  morphine  - Injectable 1 milliGRAM(s) IV Push every 4 hours PRN    VITALS:   T(F): 97.4  HR: 90  BP: 120/59  RR: 18  SpO2: 96%    LABS:                        7.7    2.91  )-----------( 114      ( 30 May 2018 04:47 )             22.0     05-30    137  |  100  |  24<H>  ----------------------------<  144<H>  4.6   |  24  |  0.6<L>    Ca    8.5      30 May 2018 04:47  Phos  3.9     05-30  Mg     1.9     05-30      PT/INR - ( 29 May 2018 09:25 )   PT: 14.10 sec;   INR: 1.30 ratio         PTT - ( 29 May 2018 09:25 )  PTT:25.9 sec              RADIOLOGY:    PHYSICAL EXAM:  GEN: No acute distress  LUNGS: Clear to auscultation bilaterally   HEART: S1/S2 present. RRR.   ABD: Soft, non-tender, non-distended. Bowel sounds present  EXT: NC/NC/NE/2+PP/RAJAN  NEURO: AAOX3 SUBJECTIVE:    Patient is a 67y old Female who presents with a chief complaint of cough, dyspnea on exertion, generalized weakness (21 May 2018 16:39)    Currently admitted to medicine with the primary diagnosis of Anemia     Today Hgb was 5.5, transfusing 2 units pRBC's    PAST MEDICAL & SURGICAL HISTORY  Gastrointestinal bleeding, lower: In january 2018  Breast CA  History of total left hip replacement  Status post right breast lumpectomy    SOCIAL HISTORY:  Negative for smoking/alcohol/drug use.     ALLERGIES:  No Known Allergies    MEDICATIONS:  STANDING MEDICATIONS  ALBUTerol    0.083% 2.5 milliGRAM(s) Nebulizer every 6 hours  ampicillin/sulbactam  IVPB      ampicillin/sulbactam  IVPB 1.5 Gram(s) IV Intermittent every 6 hours  anastrozole 1 milliGRAM(s) Oral daily  buDESOnide 160 MICROgram(s)/formoterol 4.5 MICROgram(s) Inhaler 2 Puff(s) Inhalation two times a day  nicotine -  14 mG/24Hr(s) Patch 1 patch Transdermal daily  nystatin Powder 1 Application(s) Topical two times a day  pantoprazole    Tablet 40 milliGRAM(s) Oral two times a day  sodium chloride 0.9%. 1000 milliLiter(s) IV Continuous <Continuous>    PRN MEDICATIONS  acetaminophen   Tablet 650 milliGRAM(s) Oral every 6 hours PRN  guaiFENesin    Syrup 100 milliGRAM(s) Oral every 6 hours PRN  melatonin 5 milliGRAM(s) Oral once PRN  morphine  - Injectable 1 milliGRAM(s) IV Push every 4 hours PRN    VITALS:   T(F): 97.4  HR: 90  BP: 120/59  RR: 18  SpO2: 96%    LABS:                        7.7    2.91  )-----------( 114      ( 30 May 2018 04:47 )             22.0     05-30    137  |  100  |  24<H>  ----------------------------<  144<H>  4.6   |  24  |  0.6<L>    Ca    8.5      30 May 2018 04:47  Phos  3.9     05-30  Mg     1.9     05-30      PT/INR - ( 29 May 2018 09:25 )   PT: 14.10 sec;   INR: 1.30 ratio         PTT - ( 29 May 2018 09:25 )  PTT:25.9 sec              RADIOLOGY:    PHYSICAL EXAM:  GEN: No acute distress  LUNGS: Clear to auscultation bilaterally   HEART: S1/S2 present. RRR.   ABD: Soft, non-tender, non-distended. Bowel sounds present  EXT: NC/NC/NE/2+PP/RAJAN  NEURO: AAOX3 SUBJECTIVE:    Patient is a 67y old Female who presents with a chief complaint of cough, dyspnea on exertion, generalized weakness (21 May 2018 16:39)    Currently admitted to medicine with the primary diagnosis of Anemia     Today Hgb was 5.5, transfusing 2 units pRBC's    PAST MEDICAL & SURGICAL HISTORY  Gastrointestinal bleeding, lower: In january 2018  Breast CA  History of total left hip replacement  Status post right breast lumpectomy    SOCIAL HISTORY:  Negative for smoking/alcohol/drug use.     ALLERGIES:  No Known Allergies    MEDICATIONS:  STANDING MEDICATIONS  ALBUTerol    0.083% 2.5 milliGRAM(s) Nebulizer every 6 hours  ampicillin/sulbactam  IVPB      ampicillin/sulbactam  IVPB 1.5 Gram(s) IV Intermittent every 6 hours  anastrozole 1 milliGRAM(s) Oral daily  buDESOnide 160 MICROgram(s)/formoterol 4.5 MICROgram(s) Inhaler 2 Puff(s) Inhalation two times a day  nicotine -  14 mG/24Hr(s) Patch 1 patch Transdermal daily  nystatin Powder 1 Application(s) Topical two times a day  pantoprazole    Tablet 40 milliGRAM(s) Oral two times a day  sodium chloride 0.9%. 1000 milliLiter(s) IV Continuous <Continuous>    PRN MEDICATIONS  acetaminophen   Tablet 650 milliGRAM(s) Oral every 6 hours PRN  guaiFENesin    Syrup 100 milliGRAM(s) Oral every 6 hours PRN  melatonin 5 milliGRAM(s) Oral once PRN  morphine  - Injectable 1 milliGRAM(s) IV Push every 4 hours PRN    VITALS:   T(F): 97.4  HR: 90  BP: 120/59  RR: 18  SpO2: 96%    LABS:                        7.7    2.91  )-----------( 114      ( 30 May 2018 04:47 )             22.0     05-30    137  |  100  |  24<H>  ----------------------------<  144<H>  4.6   |  24  |  0.6<L>    Ca    8.5      30 May 2018 04:47  Phos  3.9     05-30  Mg     1.9     05-30      PT/INR - ( 29 May 2018 09:25 )   PT: 14.10 sec;   INR: 1.30 ratio         PTT - ( 29 May 2018 09:25 )  PTT:25.9 sec      RADIOLOGY:    PHYSICAL EXAM:  GEN: No acute distress  LUNGS: Clear to auscultation bilaterally   HEART: S1/S2 present. RRR.   ABD: Soft, non-tender, non-distended. Bowel sounds present  EXT: NC/NC/NE/2+PP/RAJAN  NEURO: AAOX3

## 2018-05-31 NOTE — CONSULT NOTE ADULT - SUBJECTIVE AND OBJECTIVE BOX
ROSE JOY  67y, Female  Allergy: No Known Allergies      HPI:  66 yo female patient with PMHx of right breast cancer (Well to moderately differentiated invasive ductal carcinoma ER+, NV+, Her-2 neg, stage T1c, N0, M0) s/p lumpectomy around 1.5 years ago, followed by radiation therapy and currently maintained on anastrazole, history of lower GI bleeding back in January 2018 which was attributed to hemorrhoids, s/p hemorrhoidectomy and fissurotomy, currently presented because of worsening generalized weakness, dyspnea on exertion and cough.  Patient in fact has multiple complaints:  1- She complains of few days of productive cough, clear phlegm, associated with left armpit pain, radiating down her left side, worse with coughing and with deep inspiration. Patient is a former smoker of around 50 pack year, stopped 4 months ago. Denies sore throat, rhinorrhea, fever...  2- Patient is also having generalized weakness and exertional shortness of breath, getting worse over the last month or so.  3- Patient is complaining of intermittent headaches recently    In ED, patient was found to have severe anemia (Hg 5.8) and positive guaiac, admitted for evaluation. (21 May 2018 16:39)    FAMILY HISTORY:  No pertinent family history in first degree relatives    PAST MEDICAL & SURGICAL HISTORY:  Gastrointestinal bleeding, lower: In january 2018  Breast CA  History of total left hip replacement  Status post right breast lumpectomy        VITALS:  T(F): 97.4, Max: 99.9 (05-30-18 @ 11:19)  HR: 90  BP: 120/59  RR: 18Vital Signs Last 24 Hrs  T(C): 36.3 (31 May 2018 06:04), Max: 37.7 (30 May 2018 11:19)  T(F): 97.4 (31 May 2018 06:04), Max: 99.9 (30 May 2018 11:19)  HR: 90 (31 May 2018 06:04) (79 - 90)  BP: 120/59 (31 May 2018 06:04) (100/52 - 145/105)  BP(mean): --  RR: 18 (31 May 2018 06:04) (16 - 18)  SpO2: 96% (30 May 2018 18:15) (95% - 97%)    TESTS & MEASUREMENTS:                        5.5    2.30  )-----------( 109      ( 31 May 2018 06:58 )             16.8     05-31    137  |  102  |  18  ----------------------------<  113<H>  4.1   |  23  |  0.7    Ca    7.9<L>      31 May 2018 06:58  Phos  3.9     05-30  Mg     1.8     05-31    TPro  6.3  /  Alb  2.9<L>  /  TBili  5.0<H>  /  DBili  3.4<H>  /  AST  42<H>  /  ALT  52<H>  /  AlkPhos  330<H>  05-31    LIVER FUNCTIONS - ( 31 May 2018 06:58 )  Alb: 2.9 g/dL / Pro: 6.3 g/dL / ALK PHOS: 330 U/L / ALT: 52 U/L / AST: 42 U/L / GGT: x                   RADIOLOGY & ADDITIONAL TESTS:    ANTIBIOTICS:  ampicillin/sulbactam  IVPB      ampicillin/sulbactam  IVPB 1.5 Gram(s) IV Intermittent every 6 hours

## 2018-05-31 NOTE — PROGRESS NOTE ADULT - SUBJECTIVE AND OBJECTIVE BOX
AXILLARY LYMPHADENOPATHY    Subjective: Complains of pain in the region of biopsy.    Objective:   PAST MEDICAL & SURGICAL HISTORY:  Gastrointestinal bleeding, lower: In 2018  Breast CA  History of total left hip replacement  Status post right breast lumpectomy      Vital Signs Last 24 Hrs  T(C): 36.3 (31 May 2018 06:04), Max: 37.7 (30 May 2018 11:19)  T(F): 97.4 (31 May 2018 06:04), Max: 99.9 (30 May 2018 11:19)  HR: 90 (31 May 2018 06:04) (79 - 90)  BP: 120/59 (31 May 2018 06:04) (100/52 - 145/105)  BP(mean): --  RR: 18 (31 May 2018 06:04) (16 - 18)  SpO2: 96% (30 May 2018 18:15) (95% - 97%)        I&O's Detail                 5.5    2.30  )-----------( 109      (  @ 06:58 )             16.8                7.7    2.91  )-----------( 114      (  @ 04:47 )             22.0                5.8    2.60  )-----------( 107      (  @ 09:25 )             17.8                    137   |  102   |  18                 Ca: 7.9    BMP:   ----------------------------< 113    M.8   (18 @ 06:58)             4.1    |  23    | 0.7                Ph: x        LFT:     TPro: 6.3 / Alb: 2.9 / TBili: 5.0 / DBili: 3.4 / AST: 42 / ALT: 52 / AlkPhos: 330   (18 @ 06:58)          PT/INR - ( 29 May 2018 09:25 )   PT: 14.10 sec;   INR: 1.30 ratio         PTT - ( 29 May 2018 09:25 )  PTT:25.9 sec                          MEDICATIONS  (STANDING):  ALBUTerol    0.083% 2.5 milliGRAM(s) Nebulizer every 6 hours  ampicillin/sulbactam  IVPB      ampicillin/sulbactam  IVPB 1.5 Gram(s) IV Intermittent every 6 hours  anastrozole 1 milliGRAM(s) Oral daily  buDESOnide 160 MICROgram(s)/formoterol 4.5 MICROgram(s) Inhaler 2 Puff(s) Inhalation two times a day  nicotine -  14 mG/24Hr(s) Patch 1 patch Transdermal daily  nystatin Powder 1 Application(s) Topical two times a day  pantoprazole    Tablet 40 milliGRAM(s) Oral two times a day  sodium chloride 0.9%. 1000 milliLiter(s) (75 mL/Hr) IV Continuous <Continuous>    MEDICATIONS  (PRN):  acetaminophen   Tablet 650 milliGRAM(s) Oral every 6 hours PRN For Temp greater than 38 C (100.4 F)  guaiFENesin    Syrup 100 milliGRAM(s) Oral every 6 hours PRN Cough  melatonin 5 milliGRAM(s) Oral once PRN Insomnia  morphine  - Injectable 1 milliGRAM(s) IV Push every 4 hours PRN Moderate Pain (4 - 6)      Diet, Regular (18 @ 13:53)  Diet, NPO after Midnight:      NPO Start Date: 29-May-2018,   NPO Start Time: 23:59 (18 @ 13:53)      PHYSICAL EXAM:  General Appearance: Appears well, NAD  Neck: Supple  Chest: Equal expansion bilaterally, equal breath sounds  CV: S1, S2, RRR  Abdomen: Soft, NT, ND  Extremities: Grossly symmetric, WNL  Neuro: A&Ox3

## 2018-05-31 NOTE — PROGRESS NOTE ADULT - ASSESSMENT
68 yo female patient with PMHx of right breast cancer (Well to moderately differentiated invasive ductal carcinoma ER+, IA+, Her-2 neg, stage T1c, N0, M0) s/p lumpectomy around 1.5 years ago, followed by radiation therapy and currently maintained on anastrazole, history of lower GI bleeding back in January 2018 which was attributed to hemorrhoids, s/p hemorrhoidectomy and fissurotomy, currently presented because of worsening generalized weakness, dyspnea on exertion and cough. Patient in fact has multiple complaints: 1- She complains of few days of productive cough, clear phlegm, associated with left armpit pain, radiating down her left side, worse with coughing and with deep inspiration. Patient is a former smoker of around 50 pack year, stopped 4 months ago. Denies sore throat, rhinorrhea, fever...2- Patient is also having generalized weakness and exertional shortness of breath, getting worse over the last month or so. 3- Patient is complaining of intermittent headaches recently  In ED, patient was found to have severe anemia (Hg 5.8) and positive guaiac, admitted for evaluation. (21 May 2018 16:39)    #) Symptomatic anemia with pancytopenia  -pt has elevated LDH, high retic count, positive warm antibodies,  -has history of lower GI bleeding few months ago, and was found to have positive guaiac in ED, no active bleed now  -gastritis on EGD  -keep Hb> 7  -s/p 3 transfusion since admission  -GI following (Dr. Martinez)- capsule endoscopy held since the CT scan showed suspicion of lymphoma so hemonc work up is currently more imminent for the patient especially that she does not have any signs of active GI bleed.   -Heme/Onc following (Dr. Miller)- Surgery evaluation for axillary LN biopsy  -transfused 2 units pRBC on 05/29/18  -on unasyn (started 05/29)  -Sx following (Dr. Guzman)- s/p L axillary LN bx at [facility] on 05/30/18    #) Cough and exertional shortness of breath  -resolved SOB, cough improved  -Patient might have non diagnosed COPD (chronic smoking 50 pack year, stopped only 4 months ago)  -possible acute bronchitis / viral upper resp tract infection    #) History of buttock skin lesion s/p excision during hemorrhoidectomy surgery  -Pathology showing malignancy of undetermined origin, clean margins of excision  -Needs to follow up with oncology and address this issue too    #) Breast cancer s/p lumpectomy and currently on anastrozole  -c/w anastrazole    #) DVT/ GI ppx  -encourage ambulation, sequentials/ protonix    #) Code Status  -full code    #) Dispo  -from home 68 yo female patient with PMHx of right breast cancer (Well to moderately differentiated invasive ductal carcinoma ER+, DE+, Her-2 neg, stage T1c, N0, M0) s/p lumpectomy around 1.5 years ago, followed by radiation therapy and currently maintained on anastrazole, history of lower GI bleeding back in January 2018 which was attributed to hemorrhoids, s/p hemorrhoidectomy and fissurotomy, currently presented because of worsening generalized weakness, dyspnea on exertion and cough. Patient in fact has multiple complaints: 1- She complains of few days of productive cough, clear phlegm, associated with left armpit pain, radiating down her left side, worse with coughing and with deep inspiration. Patient is a former smoker of around 50 pack year, stopped 4 months ago. Denies sore throat, rhinorrhea, fever...2- Patient is also having generalized weakness and exertional shortness of breath, getting worse over the last month or so. 3- Patient is complaining of intermittent headaches recently  In ED, patient was found to have severe anemia (Hg 5.8) and positive guaiac, admitted for evaluation. (21 May 2018 16:39)    #) Symptomatic anemia with pancytopenia  -pt has elevated LDH, high retic count, positive warm antibodies,  -has history of lower GI bleeding few months ago, and was found to have positive guaiac in ED, no active bleed now  -gastritis on EGD  -keep Hb> 7  -s/p 3 transfusion since admission  -GI following (Dr. Martinez)- capsule endoscopy held since the CT scan showed suspicion of lymphoma so hemonc work up is currently more imminent for the patient especially that she does not have any signs of active GI bleed.   -Heme/Onc following (Dr. Miller)- Surgery evaluation for axillary LN biopsy  -transfused 2 units pRBC on 05/29/18  -Sx following (Dr. Guzman)- s/p L axillary LN bx at [facility] on 05/30/18  -ID c/s (Dr. John)- keflex 500mg q8h x6 days  -today Hgb was 5.5, transfusing 2 units pRBC's    #) Cough and exertional shortness of breath  -resolved SOB, cough improved  -Patient might have non diagnosed COPD (chronic smoking 50 pack year, stopped only 4 months ago)  -possible acute bronchitis / viral upper resp tract infection    #) History of buttock skin lesion s/p excision during hemorrhoidectomy surgery  -Pathology showing malignancy of undetermined origin, clean margins of excision  -Needs to follow up with oncology and address this issue too    #) Breast cancer s/p lumpectomy and currently on anastrozole  -c/w anastrazole    #) DVT/ GI ppx  -encourage ambulation, sequentials/ protonix    #) Code Status  -full code    #) Dispo  -from home 68 yo female patient with PMHx of right breast cancer (Well to moderately differentiated invasive ductal carcinoma ER+, MN+, Her-2 neg, stage T1c, N0, M0) s/p lumpectomy around 1.5 years ago, followed by radiation therapy and currently maintained on anastrazole, history of lower GI bleeding back in January 2018 which was attributed to hemorrhoids, s/p hemorrhoidectomy and fissurotomy, currently presented because of worsening generalized weakness, dyspnea on exertion and cough. Patient in fact has multiple complaints: 1- She complains of few days of productive cough, clear phlegm, associated with left armpit pain, radiating down her left side, worse with coughing and with deep inspiration. Patient is a former smoker of around 50 pack year, stopped 4 months ago. Denies sore throat, rhinorrhea, fever...2- Patient is also having generalized weakness and exertional shortness of breath, getting worse over the last month or so. 3- Patient is complaining of intermittent headaches recently  In ED, patient was found to have severe anemia (Hg 5.8) and positive guaiac, admitted for evaluation. (21 May 2018 16:39)    #) Symptomatic anemia with pancytopenia  -pt has elevated LDH, high retic count, positive warm antibodies,  -has history of lower GI bleeding few months ago, and was found to have positive guaiac in ED, no active bleed now  -gastritis on EGD  -keep Hb> 7  -s/p 3 transfusion since admission  -GI following (Dr. Martinez)- capsule endoscopy held since the CT scan showed suspicion of lymphoma so hemonc work up is currently more imminent for the patient especially that she does not have any signs of active GI bleed.   -transfused 2 units pRBC on 05/29/18  -Sx following (Dr. Guzman)- s/p L axillary LN bx at [facility] on 05/30/18  -ID c/s (Dr. John)- keflex 500mg q8h x6 days  -today Hgb was 5.5, transfusing 2 units pRBC's  -Heme/Onc f/u (Dr. Miller)- bone marrow bx tomorrow (06/01/18)    #) Cough and exertional shortness of breath  -resolved SOB, cough improved  -Patient might have non diagnosed COPD (chronic smoking 50 pack year, stopped only 4 months ago)  -possible acute bronchitis / viral upper resp tract infection    #) History of buttock skin lesion s/p excision during hemorrhoidectomy surgery  -Pathology showing malignancy of undetermined origin, clean margins of excision  -Needs to follow up with oncology and address this issue too    #) Breast cancer s/p lumpectomy and currently on anastrozole  -c/w anastrazole    #) DVT/ GI ppx  -encourage ambulation, sequentials/ protonix    #) Code Status  -full code    #) Dispo  -from home 68 yo female patient with PMHx of right breast cancer (Well to moderately differentiated invasive ductal carcinoma ER+, NE+, Her-2 neg, stage T1c, N0, M0) s/p lumpectomy around 1.5 years ago, followed by radiation therapy and currently maintained on anastrazole, history of lower GI bleeding back in January 2018 which was attributed to hemorrhoids, s/p hemorrhoidectomy and fissurotomy, currently presented because of worsening generalized weakness, dyspnea on exertion and cough. Patient in fact has multiple complaints: 1- She complains of few days of productive cough, clear phlegm, associated with left armpit pain, radiating down her left side, worse with coughing and with deep inspiration. Patient is a former smoker of around 50 pack year, stopped 4 months ago. Denies sore throat, rhinorrhea, fever...2- Patient is also having generalized weakness and exertional shortness of breath, getting worse over the last month or so. 3- Patient is complaining of intermittent headaches recently  In ED, patient was found to have severe anemia (Hg 5.8) and positive guaiac, admitted for evaluation. (21 May 2018 16:39)    #) Symptomatic anemia with pancytopenia  -pt has elevated LDH, high retic count, positive warm antibodies,  -has history of lower GI bleeding few months ago, and was found to have positive guaiac in ED, no active bleed now  -gastritis on EGD  -keep Hb> 7  -s/p 3 transfusion since admission  -GI following (Dr. Martinez)- capsule endoscopy held since the CT scan showed suspicion of lymphoma so hemonc work up is currently more imminent for the patient especially that she does not have any signs of active GI bleed.   -transfused 2 units pRBC on 05/29/18  -Sx following (Dr. Guzman)- s/p L axillary LN bx at [facility] on 05/30/18  -ID c/s (Dr. John)- keflex 500mg q8h x6 days  -today Hgb was 5.5, transfusing 2 units pRBC's  -Heme/Onc f/u (Dr. Miller)- bone marrow bx tomorrow (06/01/18)    #) Cough and exertional shortness of breath  -resolved SOB, cough improved  -Patient might have non diagnosed COPD (chronic smoking 50 pack year, stopped only 4 months ago)  -possible acute bronchitis / viral upper resp tract infection    #) History of buttock skin lesion s/p excision during hemorrhoidectomy surgery  -Pathology showing malignancy of undetermined origin, clean margins of excision  -Needs to follow up with oncology and address this issue too    #) Breast cancer s/p lumpectomy and currently on anastrozole  -c/w anastrazole    #) DVT/ GI ppx  -encourage ambulation, sequentials/ protonix    #) Code Status  -full code    #) Dispo  -from home     Attending Attestation    Pt seen and examined. Case and Plan discussed at rounds and at bedside. Agree with resident note as corrected above. Heme/Onc note reviewed. At this time will complete hemolysis w/up and re-check guiac status. Complete the 2PRBC transfusion. If guiac positive will re-call GI back. Possible plan to do CAPSULE Study as an outpatient. Continue will all other care per orders. 66 yo female patient with PMHx of right breast cancer (Well to moderately differentiated invasive ductal carcinoma ER+, CA+, Her-2 neg, stage T1c, N0, M0) s/p lumpectomy around 1.5 years ago, followed by radiation therapy and currently maintained on anastrazole, history of lower GI bleeding back in January 2018 which was attributed to hemorrhoids, s/p hemorrhoidectomy and fissurotomy, currently presented because of worsening generalized weakness, dyspnea on exertion and cough. Patient in fact has multiple complaints: 1- She complains of few days of productive cough, clear phlegm, associated with left armpit pain, radiating down her left side, worse with coughing and with deep inspiration. Patient is a former smoker of around 50 pack year, stopped 4 months ago. Denies sore throat, rhinorrhea, fever...2- Patient is also having generalized weakness and exertional shortness of breath, getting worse over the last month or so. 3- Patient is complaining of intermittent headaches recently  In ED, patient was found to have severe anemia (Hg 5.8) and positive guaiac, admitted for evaluation. (21 May 2018 16:39)    #) Symptomatic anemia with pancytopenia  -pt has elevated LDH, high retic count, positive warm antibodies,  -has history of lower GI bleeding few months ago, and was found to have positive guaiac in ED, no active bleed now  -gastritis on EGD  -keep Hb> 7  -s/p 3 transfusion since admission  -GI following (Dr. Martinez)- capsule endoscopy held since the CT scan showed suspicion of lymphoma so hemonc work up is currently more imminent for the patient especially that she does not have any signs of active GI bleed.   -transfused 2 units pRBC on 05/29/18  -Sx following (Dr. Guzman)- s/p L axillary LN bx at [facility] on 05/30/18  -ID c/s (Dr. John)- keflex 500mg q8h x6 days  -today Hgb was 5.5, transfusing 2 units pRBC's  -Heme/Onc f/u (Dr. Miller)- bone marrow bx tomorrow (06/01/18)    #) Cough and exertional shortness of breath  -resolved SOB, cough improved  -Patient might have non diagnosed COPD (chronic smoking 50 pack year, stopped only 4 months ago)  -possible acute bronchitis / viral upper resp tract infection    #) History of buttock skin lesion s/p excision during hemorrhoidectomy surgery  -Pathology showing malignancy of undetermined origin, clean margins of excision  -Needs to follow up with oncology and address this issue too    #) Breast cancer s/p lumpectomy and currently on anastrozole  -c/w anastrazole    #) DVT/ GI ppx  -encourage ambulation, sequentials/ protonix    #) Code Status  -full code    #) Dispo  -from home     Attending Attestation    Pt seen and examined. Case and Plan discussed at rounds and at bedside. Agree with resident note as corrected above. Heme/Onc note reviewed. At this time will complete hemolysis w/up and re-check guiac status. Complete the 2PRBC transfusion. If guiac positive will re-call GI back. Possible plan to do CAPSULE Study as an outpatient. Continue with all other care per orders. PANCYTOPENIA unclear etiology at this time. Still undergoing investigation and may need Bone Marrow Bx.

## 2018-05-31 NOTE — CONSULT NOTE ADULT - ASSESSMENT
IMPRESSION:  Non suppurative left arm superficial thrombophlebitis.   No sepsis.    RECOMMENATIONS:  Local warm compressors.  Po Keflex 500 mg q8h for 6 more days.  Recall prn please.

## 2018-05-31 NOTE — CONSULT NOTE ADULT - SUBJECTIVE AND OBJECTIVE BOX
INTERVENTIONAL RADIOLOGY CONSULT:     Procedure Requested: Bone marrow biopsy.     HPI:  68 yo female patient with PMHx of right breast cancer (Well to moderately differentiated invasive ductal carcinoma ER+, MN+, Her-2 neg, stage T1c, N0, M0) s/p lumpectomy around 1.5 years ago, followed by radiation therapy and currently maintained on anastrazole, history of lower GI bleeding back in January 2018 which was attributed to hemorrhoids, s/p hemorrhoidectomy and fissurotomy, currently presented because of worsening generalized weakness, dyspnea on exertion and cough.  Patient in fact has multiple complaints:  1- She complains of few days of productive cough, clear phlegm, associated with left armpit pain, radiating down her left side, worse with coughing and with deep inspiration. Patient is a former smoker of around 50 pack year, stopped 4 months ago. Denies sore throat, rhinorrhea, fever...  2- Patient is also having generalized weakness and exertional shortness of breath, getting worse over the last month or so.  3- Patient is complaining of intermittent headaches recently    In ED, patient was found to have severe anemia (Hg 5.8) and positive guaiac, admitted for evaluation. (21 May 2018 16:39)      PAST MEDICAL & SURGICAL HISTORY:  Gastrointestinal bleeding, lower: In january 2018  Breast CA  History of total left hip replacement  Status post right breast lumpectomy      MEDICATIONS  (STANDING):  ALBUTerol    0.083% 2.5 milliGRAM(s) Nebulizer every 6 hours  anastrozole 1 milliGRAM(s) Oral daily  buDESOnide 160 MICROgram(s)/formoterol 4.5 MICROgram(s) Inhaler 2 Puff(s) Inhalation two times a day  cephalexin 500 milliGRAM(s) Oral three times a day  nicotine -  14 mG/24Hr(s) Patch 1 patch Transdermal daily  nystatin Powder 1 Application(s) Topical two times a day  pantoprazole    Tablet 40 milliGRAM(s) Oral two times a day  sodium chloride 0.9%. 1000 milliLiter(s) (75 mL/Hr) IV Continuous <Continuous>    MEDICATIONS  (PRN):  acetaminophen   Tablet 650 milliGRAM(s) Oral every 6 hours PRN For Temp greater than 38 C (100.4 F)  guaiFENesin    Syrup 100 milliGRAM(s) Oral every 6 hours PRN Cough  melatonin 5 milliGRAM(s) Oral once PRN Insomnia  morphine  - Injectable 1 milliGRAM(s) IV Push every 4 hours PRN Moderate Pain (4 - 6)  oxyCODONE    IR 5 milliGRAM(s) Oral three times a day PRN Moderate Pain (4 - 6)      Allergies    No Known Allergies    Intolerances      FAMILY HISTORY:  No pertinent family history in first degree relatives      Physical Exam:   Vital Signs Last 24 Hrs  T(C): 36.3 (31 May 2018 06:04), Max: 37.7 (30 May 2018 12:50)  T(F): 97.4 (31 May 2018 06:04), Max: 99 (30 May 2018 15:34)  HR: 90 (31 May 2018 06:04) (79 - 90)  BP: 120/59 (31 May 2018 06:04) (100/52 - 145/105)  BP(mean): --  RR: 18 (31 May 2018 06:04) (16 - 18)  SpO2: 96% (30 May 2018 18:15) (95% - 97%)        Labs:                         6.5    2.66  )-----------( 122      ( 31 May 2018 09:19 )             19.6     05-31    137  |  102  |  18  ----------------------------<  113<H>  4.1   |  23  |  0.7    Ca    7.9<L>      31 May 2018 06:58  Phos  3.9     05-30  Mg     1.8     05-31    TPro  6.8  /  Alb  3.1<L>  /  TBili  5.5<H>  /  DBili  3.4<H>  /  AST  44<H>  /  ALT  52<H>  /  AlkPhos  353<H>  05-31    PT/INR - ( 31 May 2018 09:19 )   PT: 12.80 sec;   INR: 1.18 ratio         PTT - ( 31 May 2018 09:19 )  PTT:24.8 sec    Pertinent labs:                      6.5    2.66  )-----------( 122      ( 31 May 2018 09:19 )             19.6       05-31    137  |  102  |  18  ----------------------------<  113<H>  4.1   |  23  |  0.7    Ca    7.9<L>      31 May 2018 06:58  Phos  3.9     05-30  Mg     1.8     05-31    TPro  6.8  /  Alb  3.1<L>  /  TBili  5.5<H>  /  DBili  3.4<H>  /  AST  44<H>  /  ALT  52<H>  /  AlkPhos  353<H>  05-31      PT/INR - ( 31 May 2018 09:19 )   PT: 12.80 sec;   INR: 1.18 ratio         PTT - ( 31 May 2018 09:19 )  PTT:24.8 sec        ASSESSMENT/ PLAN:     -Lymphadenopathy and thrombocytopenia. Request for bone marrow biopsy.   -Anemia- Hb  6.5 today - will require 2 units PRBC prior to biopsy.  -Will plan for biopsy tomorrow.   -NPO 12a.         Thank you for the courtesy of this consult, please call [phone] with any further questions.

## 2018-05-31 NOTE — PROGRESS NOTE ADULT - ASSESSMENT
Lymphadenopathy with pancytopenia: ?Lymphoma. Lymphadenopathy especially in axilla could also be explained by possible breast Ca recurrence.   --S/P axillary LN biopsy  --has e/o blasts on peripheral flow cytometry. ?lymphoblastic lymphoma, will need BM biopsy. She wants IR to do it. Consulted IR.  --Transfuse 2 units PRBC    Macrocytic Anemia: Has had acute drop again. Denies having bleeding.   --Multifactorial (GI bleeding + ?anemia of chronic disease + mild AIHA)  -- B12 and folate WNL.   --Check MMA, LDH, retic, haptoglobin and LFTs --- may consider steroids if has e/o hemolysis  --Recall GI,     HyperBilirubinemia: sec to hemolysis vs ?ductal obstruction from pancreatic head mass. Consider repeating CT     DVT/GI ppx: Sequentials only Lymphadenopathy with pancytopenia: ?Lymphoma. Lymphadenopathy especially in axilla could also be explained by possible breast Ca recurrence.   --S/P axillary LN biopsy  --has e/o blasts on peripheral flow cytometry. ?lymphoblastic lymphoma, will need BM biopsy. She wants IR to do it. Consulted IR.      Macrocytic Anemia: Has had acute drop again. Denies having bleeding.   --Multifactorial (GI bleeding + ?anemia of chronic disease + mild AIHA)  --Transfuse 2 units PRBC  -- B12 and folate WNL.   --Check MMA, LDH, retic, haptoglobin and LFTs --- may consider steroids if has e/o hemolysis  --Recall GI,     HyperBilirubinemia: sec to hemolysis vs ?ductal obstruction from pancreatic head mass. Consider repeating CT     DVT/GI ppx: Sequentials only

## 2018-06-01 ENCOUNTER — RESULT REVIEW (OUTPATIENT)
Age: 68
End: 2018-06-01

## 2018-06-01 LAB
ALBUMIN SERPL ELPH-MCNC: 3.3 G/DL — LOW (ref 3.5–5.2)
ALP SERPL-CCNC: 386 U/L — HIGH (ref 30–115)
ALT FLD-CCNC: 57 U/L — HIGH (ref 0–41)
ANION GAP SERPL CALC-SCNC: 14 MMOL/L — SIGNIFICANT CHANGE UP (ref 7–14)
APTT BLD: 24.1 SEC — LOW (ref 27–39.2)
AST SERPL-CCNC: 61 U/L — HIGH (ref 0–41)
BILIRUB DIRECT SERPL-MCNC: 1.9 MG/DL — HIGH (ref 0–0.2)
BILIRUB INDIRECT FLD-MCNC: 1.7 MG/DL — HIGH (ref 0.2–1.2)
BILIRUB SERPL-MCNC: 3.6 MG/DL — HIGH (ref 0.2–1.2)
BUN SERPL-MCNC: 19 MG/DL — SIGNIFICANT CHANGE UP (ref 10–20)
CALCIUM SERPL-MCNC: 8.1 MG/DL — LOW (ref 8.5–10.1)
CHLORIDE SERPL-SCNC: 98 MMOL/L — SIGNIFICANT CHANGE UP (ref 98–110)
CO2 SERPL-SCNC: 23 MMOL/L — SIGNIFICANT CHANGE UP (ref 17–32)
CREAT SERPL-MCNC: 0.6 MG/DL — LOW (ref 0.7–1.5)
GLUCOSE SERPL-MCNC: 145 MG/DL — HIGH (ref 70–99)
HAPTOGLOB SERPL-MCNC: <20 MG/DL — LOW (ref 34–200)
HAPTOGLOB SERPL-MCNC: <20 MG/DL — LOW (ref 34–200)
HCT VFR BLD CALC: 20.6 % — LOW (ref 37–47)
HGB BLD-MCNC: 7.2 G/DL — CRITICAL LOW (ref 12–16)
INR BLD: 1.08 RATIO — SIGNIFICANT CHANGE UP (ref 0.65–1.3)
LDH SERPL L TO P-CCNC: 719 — HIGH (ref 50–242)
MCHC RBC-ENTMCNC: 31.3 PG — HIGH (ref 27–31)
MCHC RBC-ENTMCNC: 35 G/DL — SIGNIFICANT CHANGE UP (ref 32–37)
MCV RBC AUTO: 89.6 FL — SIGNIFICANT CHANGE UP (ref 81–99)
NRBC # BLD: 4 /100 WBCS — HIGH (ref 0–0)
OB PNL STL: NEGATIVE — SIGNIFICANT CHANGE UP
PLATELET # BLD AUTO: 129 K/UL — LOW (ref 130–400)
POTASSIUM SERPL-MCNC: 4.6 MMOL/L — SIGNIFICANT CHANGE UP (ref 3.5–5)
POTASSIUM SERPL-SCNC: 4.6 MMOL/L — SIGNIFICANT CHANGE UP (ref 3.5–5)
PROT SERPL-MCNC: 7.1 G/DL — SIGNIFICANT CHANGE UP (ref 6–8)
PROTHROM AB SERPL-ACNC: 11.7 SEC — SIGNIFICANT CHANGE UP (ref 9.95–12.87)
RBC # BLD: 2.3 M/UL — LOW (ref 4.2–5.4)
RBC # BLD: 2.3 M/UL — LOW (ref 4.2–5.4)
RBC # FLD: 19.2 % — HIGH (ref 11.5–14.5)
RETICS #: 30.8 K/UL — SIGNIFICANT CHANGE UP (ref 25–125)
RETICS/RBC NFR: 1.3 % — SIGNIFICANT CHANGE UP (ref 0.5–1.5)
SODIUM SERPL-SCNC: 135 MMOL/L — SIGNIFICANT CHANGE UP (ref 135–146)
TM INTERPRETATION: SIGNIFICANT CHANGE UP
WBC # BLD: 2.39 K/UL — LOW (ref 4.8–10.8)
WBC # FLD AUTO: 2.39 K/UL — LOW (ref 4.8–10.8)

## 2018-06-01 RX ORDER — ALPRAZOLAM 0.25 MG
0.12 TABLET ORAL ONCE
Qty: 0 | Refills: 0 | Status: DISCONTINUED | OUTPATIENT
Start: 2018-06-01 | End: 2018-06-01

## 2018-06-01 RX ORDER — SODIUM CHLORIDE 9 MG/ML
1000 INJECTION INTRAMUSCULAR; INTRAVENOUS; SUBCUTANEOUS
Qty: 0 | Refills: 0 | Status: DISCONTINUED | OUTPATIENT
Start: 2018-06-01 | End: 2018-06-03

## 2018-06-01 RX ADMIN — ALBUTEROL 2.5 MILLIGRAM(S): 90 AEROSOL, METERED ORAL at 14:24

## 2018-06-01 RX ADMIN — ANASTROZOLE 1 MILLIGRAM(S): 1 TABLET ORAL at 11:34

## 2018-06-01 RX ADMIN — NYSTATIN CREAM 1 APPLICATION(S): 100000 CREAM TOPICAL at 17:46

## 2018-06-01 RX ADMIN — Medication 500 MILLIGRAM(S): at 21:35

## 2018-06-01 RX ADMIN — PANTOPRAZOLE SODIUM 40 MILLIGRAM(S): 20 TABLET, DELAYED RELEASE ORAL at 17:46

## 2018-06-01 RX ADMIN — BUDESONIDE AND FORMOTEROL FUMARATE DIHYDRATE 2 PUFF(S): 160; 4.5 AEROSOL RESPIRATORY (INHALATION) at 21:35

## 2018-06-01 RX ADMIN — ALBUTEROL 2.5 MILLIGRAM(S): 90 AEROSOL, METERED ORAL at 07:27

## 2018-06-01 RX ADMIN — Medication 500 MILLIGRAM(S): at 13:12

## 2018-06-01 RX ADMIN — SODIUM CHLORIDE 75 MILLILITER(S): 9 INJECTION INTRAMUSCULAR; INTRAVENOUS; SUBCUTANEOUS at 21:36

## 2018-06-01 RX ADMIN — NYSTATIN CREAM 1 APPLICATION(S): 100000 CREAM TOPICAL at 05:09

## 2018-06-01 RX ADMIN — Medication 0.12 MILLIGRAM(S): at 22:07

## 2018-06-01 NOTE — PROGRESS NOTE ADULT - SUBJECTIVE AND OBJECTIVE BOX
SUBJECTIVE:    Patient is a 67y old Female who presents with a chief complaint of cough, dyspnea on exertion, generalized weakness (21 May 2018 16:39)    Currently admitted to medicine with the primary diagnosis of Anemia     Today is hospital day 11d. This morning she is resting comfortably in bed and reports no new issues or overnight events.     PAST MEDICAL & SURGICAL HISTORY  Gastrointestinal bleeding, lower: In january 2018  Breast CA  History of total left hip replacement  Status post right breast lumpectomy    SOCIAL HISTORY:  Negative for smoking/alcohol/drug use.     ALLERGIES:  No Known Allergies    MEDICATIONS:  STANDING MEDICATIONS  ALBUTerol    0.083% 2.5 milliGRAM(s) Nebulizer every 6 hours  anastrozole 1 milliGRAM(s) Oral daily  buDESOnide 160 MICROgram(s)/formoterol 4.5 MICROgram(s) Inhaler 2 Puff(s) Inhalation two times a day  cephalexin 500 milliGRAM(s) Oral three times a day  nicotine -  14 mG/24Hr(s) Patch 1 patch Transdermal daily  nystatin Powder 1 Application(s) Topical two times a day  pantoprazole    Tablet 40 milliGRAM(s) Oral two times a day  sodium chloride 0.9%. 1000 milliLiter(s) IV Continuous <Continuous>    PRN MEDICATIONS  acetaminophen   Tablet 650 milliGRAM(s) Oral every 6 hours PRN  guaiFENesin    Syrup 100 milliGRAM(s) Oral every 6 hours PRN  melatonin 5 milliGRAM(s) Oral once PRN  morphine  - Injectable 1 milliGRAM(s) IV Push every 4 hours PRN  oxyCODONE    IR 5 milliGRAM(s) Oral three times a day PRN    VITALS:   T(F): 96.3  HR: 72  BP: 125/58  RR: 17  SpO2: 97%    LABS:                        7.4    3.56  )-----------( 119      ( 31 May 2018 19:04 )             21.5     05-31    137  |  102  |  18  ----------------------------<  113<H>  4.1   |  23  |  0.7    Ca    7.9<L>      31 May 2018 06:58  Mg     1.8     05-31    TPro  6.8  /  Alb  3.3<L>  /  TBili  13.3<H>  /  DBili  9.4<H>  /  AST  46<H>  /  ALT  53<H>  /  AlkPhos  405<H>  05-31    PT/INR - ( 31 May 2018 09:19 )   PT: 12.80 sec;   INR: 1.18 ratio         PTT - ( 31 May 2018 09:19 )  PTT:24.8 sec              RADIOLOGY:    PHYSICAL EXAM:  GEN: No acute distress  LUNGS: Clear to auscultation bilaterally   HEART: S1/S2 present. RRR.   ABD: Soft, non-tender, non-distended. Bowel sounds present  EXT: NC/NC/NE/2+PP/RAJAN  NEURO: AAOX3 SUBJECTIVE:    Patient is a 67y old Female who presents with a chief complaint of cough, dyspnea on exertion, generalized weakness (21 May 2018 16:39)    Currently admitted to medicine with the primary diagnosis of Anemia     Today is hospital day 11d. This morning she is resting comfortably in bed and reports no new issues or overnight events.     PAST MEDICAL & SURGICAL HISTORY  Gastrointestinal bleeding, lower: In january 2018  Breast CA  History of total left hip replacement  Status post right breast lumpectomy    SOCIAL HISTORY:  Negative for smoking/alcohol/drug use.     ALLERGIES:  No Known Allergies    MEDICATIONS:  STANDING MEDICATIONS  ALBUTerol    0.083% 2.5 milliGRAM(s) Nebulizer every 6 hours  anastrozole 1 milliGRAM(s) Oral daily  buDESOnide 160 MICROgram(s)/formoterol 4.5 MICROgram(s) Inhaler 2 Puff(s) Inhalation two times a day  cephalexin 500 milliGRAM(s) Oral three times a day  nicotine -  14 mG/24Hr(s) Patch 1 patch Transdermal daily  nystatin Powder 1 Application(s) Topical two times a day  pantoprazole    Tablet 40 milliGRAM(s) Oral two times a day  sodium chloride 0.9%. 1000 milliLiter(s) IV Continuous <Continuous>    PRN MEDICATIONS  acetaminophen   Tablet 650 milliGRAM(s) Oral every 6 hours PRN  guaiFENesin    Syrup 100 milliGRAM(s) Oral every 6 hours PRN  melatonin 5 milliGRAM(s) Oral once PRN  morphine  - Injectable 1 milliGRAM(s) IV Push every 4 hours PRN  oxyCODONE    IR 5 milliGRAM(s) Oral three times a day PRN    VITALS:   T(F): 96.3  HR: 72  BP: 125/58  RR: 17  SpO2: 97%    LABS:                        7.4    3.56  )-----------( 119      ( 31 May 2018 19:04 )             21.5     05-31    137  |  102  |  18  ----------------------------<  113<H>  4.1   |  23  |  0.7    Ca    7.9<L>      31 May 2018 06:58  Mg     1.8     05-31    TPro  6.8  /  Alb  3.3<L>  /  TBili  13.3<H>  /  DBili  9.4<H>  /  AST  46<H>  /  ALT  53<H>  /  AlkPhos  405<H>  05-31    PT/INR - ( 31 May 2018 09:19 )   PT: 12.80 sec;   INR: 1.18 ratio         PTT - ( 31 May 2018 09:19 )  PTT:24.8 sec    RADIOLOGY:    PHYSICAL EXAM:  GEN: No acute distress  LUNGS: Clear to auscultation bilaterally   HEART: S1/S2 present. RRR.   ABD: Soft, non-tender, non-distended. Bowel sounds present  EXT: NC/NC/NE/2+PP/RAJAN  NEURO: AAOX3

## 2018-06-01 NOTE — PROGRESS NOTE ADULT - ASSESSMENT
Lymphadenopathy with pancytopenia: ?Lymphoma. Lymphadenopathy especially in axilla could also be explained by possible breast Ca recurrence.   --S/P axillary LN biopsy  --has e/o blasts on peripheral flow cytometry. ?lymphoblastic lymphoma, will need BM biopsy. She wants IR to do it. Consulted IR.      Macrocytic Anemia: Has had acute drop again. Denies having bleeding.   --Multifactorial (GI bleeding + ?anemia of chronic disease + mild AIHA)  --Transfuse 2 units PRBC  -- B12 and folate WNL.   --Check MMA, LDH, retic, haptoglobin and LFTs --- may consider steroids if has e/o hemolysis  --Recall GI,     HyperBilirubinemia: sec to hemolysis vs ?ductal obstruction from pancreatic head mass. Consider repeating CT     DVT/GI ppx: Sequentials only Lymphadenopathy with pancytopenia: ?Lymphoma. Lymphadenopathy especially in axilla could also be explained by possible breast Ca recurrence.   --S/P axillary LN biopsy  --has e/o blasts on peripheral flow cytometry. ?lymphoblastic lymphoma, will need BM biopsy. She wants IR to do it. Consulted IR.      Macrocytic Anemia: Has had acute drop again. Denies having bleeding.   --Multifactorial (GI bleeding + ?anemia of chronic disease + mild AIHA)  --Transfuse 2 units PRBC  -- B12 and folate WNL.   --Check MMA, LDH, retic, haptoglobin and LFTs --- may consider steroids if has e/o hemolysis  --Recall GI,     HyperBilirubinemia:   --Predominantly direct bilirubinemia  --sec to hemolysis vs ?ductal obstruction from pancreatic head mass.   --MRCP ordered. Check blood culture, LFTs, INR, CBC, BMP and Mg  --?Cholangitis as pt had low grade fever. Suggest starting IVF and unasyn or rocephin + flagyl. GI evaluation.      DVT/GI ppx: Sequentials only Lymphadenopathy with pancytopenia: ?Lymphoma. Lymphadenopathy especially in axilla could also be explained by possible breast Ca recurrence.   --S/P axillary LN biopsy  --has e/o blasts on peripheral flow cytometry. ?lymphoblastic lymphoma, will need BM biopsy. She wants IR to do it. Consulted IR.      Macrocytic Anemia: Has had acute drop again. Denies having bleeding.   --Multifactorial (GI bleeding + ?anemia of chronic disease + mild AIHA)  --S/p 2 units PRBC yesterday  -- B12 and folate WNL.   --Check MMA, LDH, retic, haptoglobin and LFTs --- may consider steroids if has e/o active hemolysis  --Check CBC and retic       HyperBilirubinemia:   --Predominantly direct bilirubinemia  --sec to hemolysis vs ?ductal obstruction from pancreatic head mass.   --MRCP ordered. Check blood culture, LFTs, INR, CBC, BMP and Mg  --?Cholangitis as pt had low grade fever. Suggest starting IVF and unasyn or rocephin + flagyl. GI evaluation.      DVT/GI ppx: Sequentials only

## 2018-06-01 NOTE — PROGRESS NOTE ADULT - ASSESSMENT
66 yo female patient with PMHx of right breast cancer (Well to moderately differentiated invasive ductal carcinoma ER+, [place]+, Her-2 neg, stage T1c, N0, M0) s/p lumpectomy around 1.5 years ago, followed by radiation therapy and currently maintained on anastrazole, history of life-threatening lower GI bleeding 2/2 hemorrhoids s/p hemorrhoidectomy and fissurotomy by Dr Ocampo s/p reinforcement of suture lines in 1/2018 presenting with generalized weakness, dyspnea on exertion and dark stools s/p NSAIDs intake.    *Dark stools / fatigue / + NSAIDs use   likely 2/2 NSAIDs  no evidence of active GI bleeding   ALEKSANDRA empty vault   EGD 5/23 showing erosive gastritis and esophagitis   s/p 2 u of PRBCs  corrected appropriately    -cbc q12h   -PPI qd  -avoid NSAIDs   -f/u biopsies results   -capsule endoscopy held since the CT scan showed suspicion of lymphoma so hemonc work up is currently more imminent for the patient especially that she does not have any signs of active GI bleed.     *Pancytopenia   likely multifactorial   anastrozole likely a contributing factor   new suspicion of lymphoma on CT scan   Hematology f/u 68 yo female patient with PMHx of right breast cancer (Well to moderately differentiated invasive ductal carcinoma ER+, [place]+, Her-2 neg, stage T1c, N0, M0) s/p lumpectomy around 1.5 years ago, followed by radiation therapy and currently maintained on anastrazole, history of life-threatening lower GI bleeding 2/2 hemorrhoids s/p hemorrhoidectomy and fissurotomy by Dr Ocampo s/p reinforcement of suture lines in 1/2018 presenting with generalized weakness, dyspnea on exertion and dark stools s/p NSAIDs intake.    *Acute on chronic anemia   no evidence of GI bleeding   EGD 5/23 showing erosive gastritis and esophagitis   likely 2/2 hematologic disorder   c/w cbc q12h   PPI q12h   transfuse as needed    *Worsening LFTs   cholestatic pattern   r/o mass obstruction of the cbd   r/o infiltartion yb malignant cells   check MRI/MRCP   daily LFTs     *Pancytopenia   likely multifactorial   anastrozole likely a contributing factor   new suspicion of lymphoma on CT scan   f/u bone marrow and LN boipsies   Hemonc f/u 68 yo female patient with PMHx of right breast cancer (Well to moderately differentiated invasive ductal carcinoma ER+, [place]+, Her-2 neg, stage T1c, N0, M0) s/p lumpectomy around 1.5 years ago, followed by radiation therapy and currently maintained on anastrazole, history of life-threatening lower GI bleeding 2/2 hemorrhoids s/p hemorrhoidectomy and fissurotomy by Dr Ocampo s/p reinforcement of suture lines in 1/2018 presenting with generalized weakness, dyspnea on exertion and dark stools s/p NSAIDs intake.    *Acute on chronic anemia   no evidence of GI bleeding   EGD 5/23 showing erosive gastritis and esophagitis   likely 2/2 hematologic disorder   c/w cbc q12h   PPI q12h   transfuse as needed    *Worsening LFTs   cholestatic pattern   r/o mass obstruction of the cbd   r/o infiltration yb malignant cells   check MRI/MRCP   daily LFTs     *Pancytopenia   likely multifactorial   anastrozole likely a contributing factor   new suspicion of lymphoma on CT scan   f/u bone marrow and LN boipsies   Hemonc f/u

## 2018-06-01 NOTE — PROGRESS NOTE ADULT - ASSESSMENT
66 yo female patient with PMHx of right breast cancer (Well to moderately differentiated invasive ductal carcinoma ER+, [place]+, Her-2 neg, stage T1c, N0, M0) s/p lumpectomy around 1.5 years ago, followed by radiation therapy and currently maintained on anastrazole, history of lower GI bleeding back in January 2018 which was attributed to hemorrhoids, s/p hemorrhoidectomy and fissurotomy, currently presented because of worsening generalized weakness, dyspnea on exertion and cough. Patient in fact has multiple complaints: 1- She complains of few days of productive cough, clear phlegm, associated with left armpit pain, radiating down her left side, worse with coughing and with deep inspiration. Patient is a former smoker of around 50 pack year, stopped 4 months ago. Denies sore throat, rhinorrhea, fever...2- Patient is also having generalized weakness and exertional shortness of breath, getting worse over the last month or so. 3- Patient is complaining of intermittent headaches recently.  In ED, patient was found to have severe anemia (Hg 5.8) and positive guaiac, admitted for evaluation. (21 May 2018 16:39)    #) Symptomatic anemia with pancytopenia  -pt has elevated LDH, high retic count, positive warm antibodies,  -has history of lower GI bleeding few months ago, and was found to have positive guaiac in ED, no active bleed now  -gastritis on EGD  -keep Hb> 7  -s/p 3 transfusion on admission  -GI following (Dr. Martinez)- capsule endoscopy held since the CT scan showed suspicion of lymphoma so hemonc work up is currently more imminent for the patient especially that she does not have any signs of active GI bleed.   -transfused 2 units pRBC on 05/29/18  -Sx following (Dr. Guzman)- s/p L axillary LN bx at [facility] on 05/30/18  -ID following (Dr. John)- keflex 500mg q8h x6 days (started 05/31/18, end date 06/06/18)  -transfused 2 units pRBC on 05/31/18  -capsule endscopy possibly as OP  -Heme/Onc f/u (Dr. Miller)- bone marrow bx today (06/01/18)  -IR c/s (Dr. Hernandez)- bone marrow bx today    #) Cough and exertional shortness of breath  -resolved SOB, cough improved  -Patient might have non diagnosed COPD (chronic smoking 50 pack year, stopped only 4 months ago)  -possible acute bronchitis / viral upper resp tract infection    #) History of buttock skin lesion s/p excision during hemorrhoidectomy surgery  -Pathology showing malignancy of undetermined origin, clean margins of excision  -Needs to follow up with oncology and address this issue too    #) Breast cancer s/p lumpectomy and currently on anastrozole  -c/w anastrazole    #) DVT/ GI ppx  -encourage ambulation, sequentials/ protonix    #) Code Status  -full code    #) Dispo  -from home 66 yo female patient with PMHx of right breast cancer (Well to moderately differentiated invasive ductal carcinoma ER+, [place]+, Her-2 neg, stage T1c, N0, M0) s/p lumpectomy around 1.5 years ago, followed by radiation therapy and currently maintained on anastrazole, history of lower GI bleeding back in January 2018 which was attributed to hemorrhoids, s/p hemorrhoidectomy and fissurotomy, currently presented because of worsening generalized weakness, dyspnea on exertion and cough. Patient in fact has multiple complaints: 1- She complains of few days of productive cough, clear phlegm, associated with left armpit pain, radiating down her left side, worse with coughing and with deep inspiration. Patient is a former smoker of around 50 pack year, stopped 4 months ago. Denies sore throat, rhinorrhea, fever...2- Patient is also having generalized weakness and exertional shortness of breath, getting worse over the last month or so. 3- Patient is complaining of intermittent headaches recently.  In ED, patient was found to have severe anemia (Hg 5.8) and positive guaiac, admitted for evaluation. (21 May 2018 16:39)    #) Symptomatic anemia with pancytopenia  -pt has elevated LDH, high retic count, positive warm antibodies,  -has history of lower GI bleeding few months ago, and was found to have positive guaiac in ED, no active bleed now  -gastritis on EGD  -keep Hb> 7  -s/p 3 transfusion on admission  -transfused 2 units pRBC on 05/29/18  -Sx following (Dr. Guzman)- s/p L axillary LN bx at [facility] on 05/30/18  -ID following (Dr. John)- keflex 500mg q8h x6 days (started 05/31/18, end date 06/06/18)  -transfused 2 units pRBC on 05/31/18  -capsule endscopy possibly as OP  -Heme/Onc f/u (Dr. Miller)- bone marrow bx today (06/01/18), recs CT A/P  -IR (Dr. Hernandez)- s/p bone marrow bx (06/01/18)  -GI f/u (Dr. Martinez)- recs MRCP, capsule endoscopy can be done as OP  -MRCP pending    #) Cough and exertional shortness of breath  -resolved SOB, cough improved  -Patient might have non diagnosed COPD (chronic smoking 50 pack year, stopped only 4 months ago)  -possible acute bronchitis / viral upper resp tract infection    #) History of buttock skin lesion s/p excision during hemorrhoidectomy surgery  -Pathology showing malignancy of undetermined origin, clean margins of excision  -Needs to follow up with oncology and address this issue too    #) Breast cancer s/p lumpectomy and currently on anastrozole  -c/w anastrazole    #) DVT/ GI ppx  -encourage ambulation, sequentials/ protonix    #) Code Status  -full code    #) Dispo  -from home 68 yo female patient with PMHx of right breast cancer (Well to moderately differentiated invasive ductal carcinoma ER+, [place]+, Her-2 neg, stage T1c, N0, M0) s/p lumpectomy around 1.5 years ago, followed by radiation therapy and currently maintained on anastrazole, history of lower GI bleeding back in January 2018 which was attributed to hemorrhoids, s/p hemorrhoidectomy and fissurotomy, currently presented because of worsening generalized weakness, dyspnea on exertion and cough. Patient in fact has multiple complaints: 1- She complains of few days of productive cough, clear phlegm, associated with left armpit pain, radiating down her left side, worse with coughing and with deep inspiration. Patient is a former smoker of around 50 pack year, stopped 4 months ago. Denies sore throat, rhinorrhea, fever...2- Patient is also having generalized weakness and exertional shortness of breath, getting worse over the last month or so. 3- Patient is complaining of intermittent headaches recently.  In ED, patient was found to have severe anemia (Hg 5.8) and positive guaiac, admitted for evaluation. (21 May 2018 16:39)    #) Symptomatic anemia with pancytopenia  -pt has elevated LDH, high retic count, positive warm antibodies,  -has history of lower GI bleeding few months ago, and was found to have positive guaiac in ED, no active bleed now  -gastritis on EGD  -keep Hb> 7  -s/p 3 transfusion on admission  -transfused 2 units pRBC on 05/29/18  -Sx following (Dr. Guzman)- s/p L axillary LN bx at [facility] on 05/30/18  -ID following (Dr. John)- keflex 500mg q8h x6 days (started 05/31/18, end date 06/06/18)  -transfused 2 units pRBC on 05/31/18  -capsule endscopy possibly as OP  -Heme/Onc f/u (Dr. Miller)- bone marrow bx today (06/01/18), recs CT A/P  -IR (Dr. Hernandez)- s/p bone marrow bx (06/01/18)  -GI f/u (Dr. Martinez)- recs MRCP, capsule endoscopy can be done as OP  -MRCP pending    #) Cough and exertional shortness of breath  -resolved SOB, cough improved  -Patient might have non diagnosed COPD (chronic smoking 50 pack year, stopped only 4 months ago)  -possible acute bronchitis / viral upper resp tract infection    #) History of buttock skin lesion s/p excision during hemorrhoidectomy surgery  -Pathology showing malignancy of undetermined origin, clean margins of excision  -Needs to follow up with oncology and address this issue too    #) Breast cancer s/p lumpectomy and currently on anastrozole  -c/w anastrazole    #) DVT/ GI ppx  -encourage ambulation, sequentials/ protonix    #) Code Status  -full code    #) Dispo  -from home     Attending Attestation:    Pt seen and examined at bedside and case/plan discussed with Team and oncology/patient. Agree with resident documentation as corrected above. Pt here for Anemia requiring blood transfusions, Warm Ab+ and likely hemolysis as etiology. PANcytopenia picture concerning for malignancy - Metastatic dz vs Lymphoma. Follow up Bone Marrow Bx and LN biopsy. Monitor LFTs, CBC, Haptoglobin, LDH. Transfuse for hg below 7. Follow MRCP to r/o CBD obstruction and anatomy. Rule out mets to CBD or pancreatic head pathology. Continue with all other care. WIll follow.

## 2018-06-01 NOTE — PROGRESS NOTE ADULT - SUBJECTIVE AND OBJECTIVE BOX
INTERVAL HPI/OVERNIGHT EVENTS:    66 yo female patient with PMHx of right breast cancer (Well to moderately differentiated invasive ductal carcinoma ER+, CT+, Her-2 neg, stage T1c, N0, M0) s/p lumpectomy around 1.5 years ago, followed by radiation therapy and currently maintained on anastrazole, history of lower GI bleeding back in January 2018 which was attributed to hemorrhoids s/p hemorrhoidectomy and fissurotomy, currently presented because of worsening generalized weakness and dyspnea on exertion.     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 Patient was d/c uneventfully then presented again after few weeks for BRBPR and significant drop in hb and Colonoscopy 1/2018 for BRBPR by Dr sanchez: poor prep, internal hemorrhoids, skin lesion noted at the buttock, diverticulosis. No blood was seen in colon or TI in all colonoscopies so bleeding was attributed to hemorrhoids s/p hemorrhoidectomy on 1/11/18 by Dr Ocampo with excision of 2 perianal lesions and fissurotomy. Around 2 weeks after, the patient presented again with significant drop in hb and BRBPR and Dr ocampo perfomed a reinforcement of hemorrhoidectomy suture line and rigid sigmoidoscopy.      Colonoscopy 1/2018 for BRBPR by Dr sanchez: poor prep, internal hemorrhoids, skin lesion noted at the buttock, diverticulosis.  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 In ED, patient was found to have severe anemia (Hg 5.8) and positive guaiac, admitted for evaluation. Baseline hb 12-13 in 2017.     FH non significant   usually has 1 normal bm per day, no weight loss     5/23 EGD showed no obvious source of bleeding but showed erosive gastritis and esophagitis   5/24 patient could not receive the capsule endoscopy since she had oral contrast for CT scan being done for investigation for malignancy (hx of breat ca, new LN detected on recent CT). No bms overnight, no other complaints.   5/25 no bms, ct scan suspicious for lymphoma. Since this can explain her lymphoma the capsule endoscopy was cancelled  6/1 GI called again for worsening LFTs and recurrent drop in hb. Note that she received an axillary LN bx to r/o recurrent breast ca, bone marrow biopsy.     ROS wnl except as described above     PAST MEDICAL & SURGICAL HISTORY:  Gastrointestinal bleeding, lower: In january 2018  Breast CA  History of total left hip replacement  Status post right breast lumpectomy      Home Medications:  anastrozole 1 mg oral tablet: 1 tab(s) orally once a day (21 May 2018 16:50)      MEDICATIONS  (STANDING):  ALBUTerol    0.083% 2.5 milliGRAM(s) Nebulizer every 6 hours  anastrozole 1 milliGRAM(s) Oral daily  buDESOnide 160 MICROgram(s)/formoterol 4.5 MICROgram(s) Inhaler 2 Puff(s) Inhalation two times a day  cephalexin 500 milliGRAM(s) Oral three times a day  nicotine -  14 mG/24Hr(s) Patch 1 patch Transdermal daily  nystatin Powder 1 Application(s) Topical two times a day  pantoprazole    Tablet 40 milliGRAM(s) Oral two times a day  sodium chloride 0.9%. 1000 milliLiter(s) (75 mL/Hr) IV Continuous <Continuous>    MEDICATIONS  (PRN):  acetaminophen   Tablet 650 milliGRAM(s) Oral every 6 hours PRN For Temp greater than 38 C (100.4 F)  guaiFENesin    Syrup 100 milliGRAM(s) Oral every 6 hours PRN Cough  melatonin 5 milliGRAM(s) Oral once PRN Insomnia  morphine  - Injectable 1 milliGRAM(s) IV Push every 4 hours PRN Moderate Pain (4 - 6)  oxyCODONE    IR 5 milliGRAM(s) Oral three times a day PRN Moderate Pain (4 - 6)      Allergies    No Known Allergies    Intolerances            PHYSICAL EXAM:   Vital Signs:  Vital Signs Last 24 Hrs  T(C): 35.9 (01 Jun 2018 13:02), Max: 36 (31 May 2018 21:26)  T(F): 96.6 (01 Jun 2018 13:02), Max: 96.8 (31 May 2018 21:26)  HR: 90 (01 Jun 2018 13:02) (72 - 90)  BP: 145/67 (01 Jun 2018 13:02) (116/58 - 145/67)  BP(mean): --  RR: 20 (01 Jun 2018 13:02) (15 - 20)  SpO2: --  Daily Height in cm: 170.18 (01 Jun 2018 09:03)    Daily     GENERAL:  no distress  SKIN: intact  HEENT:  NC/AT,  anicteric  CHEST:   no increased effort, breath sounds clear  HEART:  Regular rhythm  ABDOMEN:  Soft, non-tender, non-distended, normoactive bowel sounds,  no masses ,no hepato-splenomegaly, no signs of chronic liver disease  EXTEREMITIES:  no cyanosis      LABS:                        7.4    3.56  )-----------( 119      ( 31 May 2018 19:04 )             21.5       Hemoglobin: 7.4 g/dL (05-31-18 @ 19:04)  Hemoglobin: 6.5 g/dL (05-31-18 @ 09:19)  Hemoglobin: 5.5 g/dL (05-31-18 @ 06:58)  Hemoglobin: 7.7 g/dL (05-30-18 @ 04:47)      05-31    137  |  102  |  18  ----------------------------<  113<H>  4.1   |  23  |  0.7    Ca    7.9<L>      31 May 2018 06:58  Mg     1.8     05-31    TPro  6.8  /  Alb  3.3<L>  /  TBili  13.3<H>  /  DBili  9.4<H>  /  AST  46<H>  /  ALT  53<H>  /  AlkPhos  405<H>  05-31      INR: 1.18 ratio (05-31-18 @ 09:19)      Alanine Aminotransferase (ALT/SGPT): 53 U/L (05-31-18 @ 19:04)  Alkaline Phosphatase, Serum: 405 U/L (05-31-18 @ 19:04)  Aspartate Aminotransferase (AST/SGOT): 46 U/L (05-31-18 @ 19:04)  Bilirubin Direct, Serum: 9.4 mg/dL (05-31-18 @ 19:04)  Alanine Aminotransferase (ALT/SGPT): 52 U/L (05-31-18 @ 09:19)  Bilirubin Direct, Serum: 3.4 mg/dL (05-31-18 @ 09:19)  Alkaline Phosphatase, Serum: 353 U/L (05-31-18 @ 09:19)  Aspartate Aminotransferase (AST/SGOT): 44 U/L (05-31-18 @ 09:19)  Aspartate Aminotransferase (AST/SGOT): 42 U/L (05-31-18 @ 06:58)  Bilirubin Direct, Serum: 3.4 mg/dL (05-31-18 @ 06:58)  Alkaline Phosphatase, Serum: 330 U/L (05-31-18 @ 06:58)  Alanine Aminotransferase (ALT/SGPT): 52 U/L (05-31-18 @ 06:58)            RADIOLOGY & ADDITIONAL TESTS: INTERVAL HPI/OVERNIGHT EVENTS:    66 yo female patient with PMHx of right breast cancer (Well to moderately differentiated invasive ductal carcinoma ER+, VT+, Her-2 neg, stage T1c, N0, M0) s/p lumpectomy around 1.5 years ago, followed by radiation therapy and currently maintained on anastrazole, history of lower GI bleeding back in January 2018 which was attributed to hemorrhoids s/p hemorrhoidectomy and fissurotomy, currently presented because of worsening generalized weakness and dyspnea on exertion.     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 Patient was d/c uneventfully then presented again after few weeks for BRBPR and significant drop in hb and Colonoscopy 1/2018 for BRBPR by Dr sanchez: poor prep, internal hemorrhoids, skin lesion noted at the buttock, diverticulosis. No blood was seen in colon or TI in all colonoscopies so bleeding was attributed to hemorrhoids s/p hemorrhoidectomy on 1/11/18 by Dr Ocampo with excision of 2 perianal lesions and fissurotomy. Around 2 weeks after, the patient presented again with significant drop in hb and BRBPR and Dr ocampo perfomed a reinforcement of hemorrhoidectomy suture line and rigid sigmoidoscopy.      Colonoscopy 1/2018 for BRBPR by Dr sanchez: poor prep, internal hemorrhoids, skin lesion noted at the buttock, diverticulosis.  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 In ED, patient was found to have severe anemia (Hg 5.8) and positive guaiac, admitted for evaluation. Baseline hb 12-13 in 2017.     FH non significant   usually has 1 normal bm per day, no weight loss     5/23 EGD showed no obvious source of bleeding but showed erosive gastritis and esophagitis   5/24 patient could not receive the capsule endoscopy since she had oral contrast for CT scan being done for investigation for malignancy (hx of breat ca, new LN detected on recent CT). No bms overnight, no other complaints.   5/25 no bms, ct scan suspicious for lymphoma. Since this can explain her lymphoma the capsule endoscopy was cancelled  6/1 GI called again for worsening LFTs and recurrent drop in hb. Note that she received an axillary LN bx to r/o recurrent breast ca, bone marrow biopsy for pancytopenia. Ct abd showing new splenomegaly, x renal lesions, 8 mm pancreatic head lesion     ROS wnl except as described above     PAST MEDICAL & SURGICAL HISTORY:  Gastrointestinal bleeding, lower: In january 2018  Breast CA  History of total left hip replacement  Status post right breast lumpectomy      Home Medications:  anastrozole 1 mg oral tablet: 1 tab(s) orally once a day (21 May 2018 16:50)      MEDICATIONS  (STANDING):  ALBUTerol    0.083% 2.5 milliGRAM(s) Nebulizer every 6 hours  anastrozole 1 milliGRAM(s) Oral daily  buDESOnide 160 MICROgram(s)/formoterol 4.5 MICROgram(s) Inhaler 2 Puff(s) Inhalation two times a day  cephalexin 500 milliGRAM(s) Oral three times a day  nicotine -  14 mG/24Hr(s) Patch 1 patch Transdermal daily  nystatin Powder 1 Application(s) Topical two times a day  pantoprazole    Tablet 40 milliGRAM(s) Oral two times a day  sodium chloride 0.9%. 1000 milliLiter(s) (75 mL/Hr) IV Continuous <Continuous>    MEDICATIONS  (PRN):  acetaminophen   Tablet 650 milliGRAM(s) Oral every 6 hours PRN For Temp greater than 38 C (100.4 F)  guaiFENesin    Syrup 100 milliGRAM(s) Oral every 6 hours PRN Cough  melatonin 5 milliGRAM(s) Oral once PRN Insomnia  morphine  - Injectable 1 milliGRAM(s) IV Push every 4 hours PRN Moderate Pain (4 - 6)  oxyCODONE    IR 5 milliGRAM(s) Oral three times a day PRN Moderate Pain (4 - 6)      Allergies    No Known Allergies    Intolerances            PHYSICAL EXAM:   Vital Signs:  Vital Signs Last 24 Hrs  T(C): 35.9 (01 Jun 2018 13:02), Max: 36 (31 May 2018 21:26)  T(F): 96.6 (01 Jun 2018 13:02), Max: 96.8 (31 May 2018 21:26)  HR: 90 (01 Jun 2018 13:02) (72 - 90)  BP: 145/67 (01 Jun 2018 13:02) (116/58 - 145/67)  BP(mean): --  RR: 20 (01 Jun 2018 13:02) (15 - 20)  SpO2: --  Daily Height in cm: 170.18 (01 Jun 2018 09:03)    Daily     GENERAL:  no distress  SKIN: intact  HEENT:  NC/AT,  anicteric  CHEST:   no increased effort, breath sounds clear  HEART:  Regular rhythm  ABDOMEN:  Soft, non-tender, non-distended, normoactive bowel sounds,  no masses ,no hepato-splenomegaly, no signs of chronic liver disease  EXTEREMITIES:  no cyanosis      LABS:                        7.4    3.56  )-----------( 119      ( 31 May 2018 19:04 )             21.5       Hemoglobin: 7.4 g/dL (05-31-18 @ 19:04)  Hemoglobin: 6.5 g/dL (05-31-18 @ 09:19)  Hemoglobin: 5.5 g/dL (05-31-18 @ 06:58)  Hemoglobin: 7.7 g/dL (05-30-18 @ 04:47)      05-31    137  |  102  |  18  ----------------------------<  113<H>  4.1   |  23  |  0.7    Ca    7.9<L>      31 May 2018 06:58  Mg     1.8     05-31    TPro  6.8  /  Alb  3.3<L>  /  TBili  13.3<H>  /  DBili  9.4<H>  /  AST  46<H>  /  ALT  53<H>  /  AlkPhos  405<H>  05-31      INR: 1.18 ratio (05-31-18 @ 09:19)      Alanine Aminotransferase (ALT/SGPT): 53 U/L (05-31-18 @ 19:04)  Alkaline Phosphatase, Serum: 405 U/L (05-31-18 @ 19:04)  Aspartate Aminotransferase (AST/SGOT): 46 U/L (05-31-18 @ 19:04)  Bilirubin Direct, Serum: 9.4 mg/dL (05-31-18 @ 19:04)  Alanine Aminotransferase (ALT/SGPT): 52 U/L (05-31-18 @ 09:19)  Bilirubin Direct, Serum: 3.4 mg/dL (05-31-18 @ 09:19)  Alkaline Phosphatase, Serum: 353 U/L (05-31-18 @ 09:19)  Aspartate Aminotransferase (AST/SGOT): 44 U/L (05-31-18 @ 09:19)  Aspartate Aminotransferase (AST/SGOT): 42 U/L (05-31-18 @ 06:58)  Bilirubin Direct, Serum: 3.4 mg/dL (05-31-18 @ 06:58)  Alkaline Phosphatase, Serum: 330 U/L (05-31-18 @ 06:58)  Alanine Aminotransferase (ALT/SGPT): 52 U/L (05-31-18 @ 06:58)            RADIOLOGY & ADDITIONAL TESTS:

## 2018-06-01 NOTE — PROGRESS NOTE ADULT - SUBJECTIVE AND OBJECTIVE BOX
Pt seen and examined at bedside.    Vital Signs Last 24 Hrs  T(C): 35.7 (01 Jun 2018 06:07), Max: 38 (31 May 2018 10:25)  T(F): 96.3 (01 Jun 2018 06:07), Max: 100.4 (31 May 2018 10:25)  HR: 72 (01 Jun 2018 06:07) (72 - 94)  BP: 125/58 (01 Jun 2018 06:07) (116/58 - 157/78)  BP(mean): --  RR: 17 (01 Jun 2018 06:07) (15 - 19)  SpO2: 97% (31 May 2018 13:21) (97% - 97%)    MEDICATIONS  (STANDING):  ALBUTerol    0.083% 2.5 milliGRAM(s) Nebulizer every 6 hours  anastrozole 1 milliGRAM(s) Oral daily  buDESOnide 160 MICROgram(s)/formoterol 4.5 MICROgram(s) Inhaler 2 Puff(s) Inhalation two times a day  cephalexin 500 milliGRAM(s) Oral three times a day  nicotine -  14 mG/24Hr(s) Patch 1 patch Transdermal daily  nystatin Powder 1 Application(s) Topical two times a day  pantoprazole    Tablet 40 milliGRAM(s) Oral two times a day  sodium chloride 0.9%. 1000 milliLiter(s) (75 mL/Hr) IV Continuous <Continuous>    MEDICATIONS  (PRN):  acetaminophen   Tablet 650 milliGRAM(s) Oral every 6 hours PRN For Temp greater than 38 C (100.4 F)  guaiFENesin    Syrup 100 milliGRAM(s) Oral every 6 hours PRN Cough  melatonin 5 milliGRAM(s) Oral once PRN Insomnia  morphine  - Injectable 1 milliGRAM(s) IV Push every 4 hours PRN Moderate Pain (4 - 6)  oxyCODONE    IR 5 milliGRAM(s) Oral three times a day PRN Moderate Pain (4 - 6)    Labs:                        7.4    3.56  )-----------( 119      ( 31 May 2018 19:04 )             21.5             05-31    137  |  102  |  18  ----------------------------<  113<H>  4.1   |  23  |  0.7    Ca    7.9<L>      31 May 2018 06:58  Mg     1.8     05-31    TPro  6.8  /  Alb  3.3<L>  /  TBili  13.3<H>  /  DBili  9.4<H>  /  AST  46<H>  /  ALT  53<H>  /  AlkPhos  405<H>  05-31    LIVER FUNCTIONS - ( 31 May 2018 19:04 )  Alb: 3.3 g/dL / Pro: 6.8 g/dL / ALK PHOS: 405 U/L / ALT: 53 U/L / AST: 46 U/L / GGT: x                 PT/INR - ( 31 May 2018 09:19 )   PT: 12.80 sec;   INR: 1.18 ratio         PTT - ( 31 May 2018 09:19 )  PTT:24.8 sec Pt seen and examined at bedside. Pt is getting bone marrow biopsy now.    Vital Signs Last 24 Hrs  T(C): 35.7 (01 Jun 2018 06:07), Max: 38 (31 May 2018 10:25)  T(F): 96.3 (01 Jun 2018 06:07), Max: 100.4 (31 May 2018 10:25)  HR: 72 (01 Jun 2018 06:07) (72 - 94)  BP: 125/58 (01 Jun 2018 06:07) (116/58 - 157/78)  BP(mean): --  RR: 17 (01 Jun 2018 06:07) (15 - 19)  SpO2: 97% (31 May 2018 13:21) (97% - 97%)    MEDICATIONS  (STANDING):  ALBUTerol    0.083% 2.5 milliGRAM(s) Nebulizer every 6 hours  anastrozole 1 milliGRAM(s) Oral daily  buDESOnide 160 MICROgram(s)/formoterol 4.5 MICROgram(s) Inhaler 2 Puff(s) Inhalation two times a day  cephalexin 500 milliGRAM(s) Oral three times a day  nicotine -  14 mG/24Hr(s) Patch 1 patch Transdermal daily  nystatin Powder 1 Application(s) Topical two times a day  pantoprazole    Tablet 40 milliGRAM(s) Oral two times a day  sodium chloride 0.9%. 1000 milliLiter(s) (75 mL/Hr) IV Continuous <Continuous>    MEDICATIONS  (PRN):  acetaminophen   Tablet 650 milliGRAM(s) Oral every 6 hours PRN For Temp greater than 38 C (100.4 F)  guaiFENesin    Syrup 100 milliGRAM(s) Oral every 6 hours PRN Cough  melatonin 5 milliGRAM(s) Oral once PRN Insomnia  morphine  - Injectable 1 milliGRAM(s) IV Push every 4 hours PRN Moderate Pain (4 - 6)  oxyCODONE    IR 5 milliGRAM(s) Oral three times a day PRN Moderate Pain (4 - 6)    Labs:                        7.4    3.56  )-----------( 119      ( 31 May 2018 19:04 )             21.5             05-31    137  |  102  |  18  ----------------------------<  113<H>  4.1   |  23  |  0.7    Ca    7.9<L>      31 May 2018 06:58  Mg     1.8     05-31    TPro  6.8  /  Alb  3.3<L>  /  TBili  13.3<H>  /  DBili  9.4<H>  /  AST  46<H>  /  ALT  53<H>  /  AlkPhos  405<H>  05-31    LIVER FUNCTIONS - ( 31 May 2018 19:04 )  Alb: 3.3 g/dL / Pro: 6.8 g/dL / ALK PHOS: 405 U/L / ALT: 53 U/L / AST: 46 U/L / GGT: x                 PT/INR - ( 31 May 2018 09:19 )   PT: 12.80 sec;   INR: 1.18 ratio         PTT - ( 31 May 2018 09:19 )  PTT:24.8 sec

## 2018-06-01 NOTE — PROGRESS NOTE ADULT - SUBJECTIVE AND OBJECTIVE BOX
INTERVENTIONAL RADIOLOGY BRIEF-OPERATIVE NOTE    Procedure: CT guided bone marrow biopsy    Pre-Op Diagnosis: Pancytopenia    Post-Op Diagnosis: as above    Attending: Yenni  Resident: kayden (kelly hdz fellow)     Anesthesia (type):  [ ] General Anesthesia  [x ] Sedation  [ ] Spinal Anesthesia  [x ] Local/Regional    Contrast: x    Estimated Blood Loss:x    Condition:   [ ] Critical  [ ] Serious  [ ] Fair   [x ] Good    Findings/Follow up Plan of Care:  20 cc of blood and 1 core bone sample obtained  no complication  Specimens Removed: x    Implants: x    Complications: x    Disposition: recovery       Please call Interventional Radiology [phone] with any questions, concerns, or issues.

## 2018-06-02 LAB
ALBUMIN SERPL ELPH-MCNC: 2.8 G/DL — LOW (ref 3.5–5.2)
ALBUMIN SERPL ELPH-MCNC: 3 G/DL — LOW (ref 3.5–5.2)
ALP SERPL-CCNC: 354 U/L — HIGH (ref 30–115)
ALP SERPL-CCNC: 377 U/L — HIGH (ref 30–115)
ALT FLD-CCNC: 53 U/L — HIGH (ref 0–41)
ALT FLD-CCNC: 58 U/L — HIGH (ref 0–41)
ANION GAP SERPL CALC-SCNC: 14 MMOL/L — SIGNIFICANT CHANGE UP (ref 7–14)
APTT BLD: 24.8 SEC — LOW (ref 27–39.2)
AST SERPL-CCNC: 53 U/L — HIGH (ref 0–41)
AST SERPL-CCNC: 73 U/L — HIGH (ref 0–41)
BASOPHILS # BLD AUTO: 0 K/UL — SIGNIFICANT CHANGE UP (ref 0–0.2)
BASOPHILS NFR BLD AUTO: 0 % — SIGNIFICANT CHANGE UP (ref 0–1)
BILIRUB DIRECT SERPL-MCNC: 1.5 MG/DL — HIGH (ref 0–0.2)
BILIRUB DIRECT SERPL-MCNC: 5.4 MG/DL — HIGH (ref 0–0.2)
BILIRUB INDIRECT FLD-MCNC: 1.4 MG/DL — HIGH (ref 0.2–1.2)
BILIRUB INDIRECT FLD-MCNC: 3.3 MG/DL — HIGH (ref 0.2–1.2)
BILIRUB SERPL-MCNC: 2.9 MG/DL — HIGH (ref 0.2–1.2)
BILIRUB SERPL-MCNC: 8.7 MG/DL — HIGH (ref 0.2–1.2)
BUN SERPL-MCNC: 24 MG/DL — HIGH (ref 10–20)
CALCIUM SERPL-MCNC: 8.1 MG/DL — LOW (ref 8.5–10.1)
CHLORIDE SERPL-SCNC: 98 MMOL/L — SIGNIFICANT CHANGE UP (ref 98–110)
CO2 SERPL-SCNC: 24 MMOL/L — SIGNIFICANT CHANGE UP (ref 17–32)
CREAT SERPL-MCNC: 0.5 MG/DL — LOW (ref 0.7–1.5)
EOSINOPHIL # BLD AUTO: 0.02 K/UL — SIGNIFICANT CHANGE UP (ref 0–0.7)
EOSINOPHIL NFR BLD AUTO: 0.8 % — SIGNIFICANT CHANGE UP (ref 0–8)
GLUCOSE SERPL-MCNC: 212 MG/DL — HIGH (ref 70–99)
HAPTOGLOB SERPL-MCNC: <20 MG/DL — LOW (ref 34–200)
HAPTOGLOB SERPL-MCNC: <20 MG/DL — LOW (ref 34–200)
HCT VFR BLD CALC: 17.5 % — LOW (ref 37–47)
HCT VFR BLD CALC: 18.9 % — LOW (ref 37–47)
HCT VFR BLD CALC: 19.5 % — LOW (ref 37–47)
HGB BLD-MCNC: 5.8 G/DL — CRITICAL LOW (ref 12–16)
HGB BLD-MCNC: 6.4 G/DL — CRITICAL LOW (ref 12–16)
HGB BLD-MCNC: 6.7 G/DL — CRITICAL LOW (ref 12–16)
HOMOCYSTEINE LEVEL: 8.8 UMOL/L — SIGNIFICANT CHANGE UP
IMM GRANULOCYTES NFR BLD AUTO: 13.1 % — HIGH (ref 0.1–0.3)
INR BLD: 1.14 RATIO — SIGNIFICANT CHANGE UP (ref 0.65–1.3)
LDH SERPL L TO P-CCNC: 617 — HIGH (ref 50–242)
LDH SERPL L TO P-CCNC: 790 — HIGH (ref 50–242)
LYMPHOCYTES # BLD AUTO: 0.68 K/UL — LOW (ref 1.2–3.4)
LYMPHOCYTES # BLD AUTO: 27.1 % — SIGNIFICANT CHANGE UP (ref 20.5–51.1)
MAGNESIUM SERPL-MCNC: 1.9 MG/DL — SIGNIFICANT CHANGE UP (ref 1.8–2.4)
MCHC RBC-ENTMCNC: 28.9 PG — SIGNIFICANT CHANGE UP (ref 27–31)
MCHC RBC-ENTMCNC: 30 PG — SIGNIFICANT CHANGE UP (ref 27–31)
MCHC RBC-ENTMCNC: 30.5 PG — SIGNIFICANT CHANGE UP (ref 27–31)
MCHC RBC-ENTMCNC: 33.1 G/DL — SIGNIFICANT CHANGE UP (ref 32–37)
MCHC RBC-ENTMCNC: 33.9 G/DL — SIGNIFICANT CHANGE UP (ref 32–37)
MCHC RBC-ENTMCNC: 34.4 G/DL — SIGNIFICANT CHANGE UP (ref 32–37)
MCV RBC AUTO: 87.1 FL — SIGNIFICANT CHANGE UP (ref 81–99)
MCV RBC AUTO: 88.6 FL — SIGNIFICANT CHANGE UP (ref 81–99)
MCV RBC AUTO: 88.7 FL — SIGNIFICANT CHANGE UP (ref 81–99)
METHYLMALONIC ACID LEVEL: 359 NMOL/L — HIGH (ref 87–318)
MONOCYTES # BLD AUTO: 0.15 K/UL — SIGNIFICANT CHANGE UP (ref 0.1–0.6)
MONOCYTES NFR BLD AUTO: 6 % — SIGNIFICANT CHANGE UP (ref 1.7–9.3)
NEUTROPHILS # BLD AUTO: 1.33 K/UL — LOW (ref 1.4–6.5)
NEUTROPHILS NFR BLD AUTO: 53 % — SIGNIFICANT CHANGE UP (ref 42.2–75.2)
NRBC # BLD: 4 /100 WBCS — HIGH (ref 0–0)
PLATELET # BLD AUTO: 110 K/UL — LOW (ref 130–400)
PLATELET # BLD AUTO: 112 K/UL — LOW (ref 130–400)
PLATELET # BLD AUTO: 122 K/UL — LOW (ref 130–400)
POTASSIUM SERPL-MCNC: 4.4 MMOL/L — SIGNIFICANT CHANGE UP (ref 3.5–5)
POTASSIUM SERPL-SCNC: 4.4 MMOL/L — SIGNIFICANT CHANGE UP (ref 3.5–5)
PROT SERPL-MCNC: 6.3 G/DL — SIGNIFICANT CHANGE UP (ref 6–8)
PROT SERPL-MCNC: 6.5 G/DL — SIGNIFICANT CHANGE UP (ref 6–8)
PROTHROM AB SERPL-ACNC: 12.4 SEC — SIGNIFICANT CHANGE UP (ref 9.95–12.87)
RBC # BLD: 2.01 M/UL — LOW (ref 4.2–5.4)
RBC # BLD: 2.01 M/UL — LOW (ref 4.2–5.4)
RBC # BLD: 2.13 M/UL — LOW (ref 4.2–5.4)
RBC # BLD: 2.13 M/UL — LOW (ref 4.2–5.4)
RBC # BLD: 2.2 M/UL — LOW (ref 4.2–5.4)
RBC # FLD: 18.5 % — HIGH (ref 11.5–14.5)
RBC # FLD: 18.6 % — HIGH (ref 11.5–14.5)
RBC # FLD: 18.8 % — HIGH (ref 11.5–14.5)
RETICS #: 33 K/UL — SIGNIFICANT CHANGE UP (ref 25–125)
RETICS #: 38.8 K/UL — SIGNIFICANT CHANGE UP (ref 25–125)
RETICS/RBC NFR: 1.6 % — HIGH (ref 0.5–1.5)
RETICS/RBC NFR: 1.8 % — HIGH (ref 0.5–1.5)
SODIUM SERPL-SCNC: 136 MMOL/L — SIGNIFICANT CHANGE UP (ref 135–146)
WBC # BLD: 2.33 K/UL — LOW (ref 4.8–10.8)
WBC # BLD: 2.51 K/UL — LOW (ref 4.8–10.8)
WBC # BLD: 2.62 K/UL — LOW (ref 4.8–10.8)
WBC # FLD AUTO: 2.33 K/UL — LOW (ref 4.8–10.8)
WBC # FLD AUTO: 2.51 K/UL — LOW (ref 4.8–10.8)
WBC # FLD AUTO: 2.62 K/UL — LOW (ref 4.8–10.8)

## 2018-06-02 RX ORDER — ALPRAZOLAM 0.25 MG
0.25 TABLET ORAL ONCE
Qty: 0 | Refills: 0 | Status: DISCONTINUED | OUTPATIENT
Start: 2018-06-02 | End: 2018-06-02

## 2018-06-02 RX ORDER — AMPICILLIN SODIUM AND SULBACTAM SODIUM 250; 125 MG/ML; MG/ML
3 INJECTION, POWDER, FOR SUSPENSION INTRAMUSCULAR; INTRAVENOUS EVERY 6 HOURS
Qty: 0 | Refills: 0 | Status: DISCONTINUED | OUTPATIENT
Start: 2018-06-02 | End: 2018-06-06

## 2018-06-02 RX ADMIN — NYSTATIN CREAM 1 APPLICATION(S): 100000 CREAM TOPICAL at 17:31

## 2018-06-02 RX ADMIN — Medication 500 MILLIGRAM(S): at 13:29

## 2018-06-02 RX ADMIN — AMPICILLIN SODIUM AND SULBACTAM SODIUM 200 GRAM(S): 250; 125 INJECTION, POWDER, FOR SUSPENSION INTRAMUSCULAR; INTRAVENOUS at 17:30

## 2018-06-02 RX ADMIN — Medication 0.25 MILLIGRAM(S): at 03:02

## 2018-06-02 RX ADMIN — PANTOPRAZOLE SODIUM 40 MILLIGRAM(S): 20 TABLET, DELAYED RELEASE ORAL at 17:31

## 2018-06-02 RX ADMIN — Medication 650 MILLIGRAM(S): at 04:21

## 2018-06-02 RX ADMIN — ANASTROZOLE 1 MILLIGRAM(S): 1 TABLET ORAL at 12:21

## 2018-06-02 RX ADMIN — Medication 60 MILLIGRAM(S): at 12:22

## 2018-06-02 RX ADMIN — NYSTATIN CREAM 1 APPLICATION(S): 100000 CREAM TOPICAL at 05:48

## 2018-06-02 RX ADMIN — ALBUTEROL 2.5 MILLIGRAM(S): 90 AEROSOL, METERED ORAL at 08:24

## 2018-06-02 RX ADMIN — Medication 500 MILLIGRAM(S): at 05:48

## 2018-06-02 RX ADMIN — PANTOPRAZOLE SODIUM 40 MILLIGRAM(S): 20 TABLET, DELAYED RELEASE ORAL at 06:17

## 2018-06-02 RX ADMIN — ALBUTEROL 2.5 MILLIGRAM(S): 90 AEROSOL, METERED ORAL at 13:33

## 2018-06-02 RX ADMIN — SODIUM CHLORIDE 75 MILLILITER(S): 9 INJECTION INTRAMUSCULAR; INTRAVENOUS; SUBCUTANEOUS at 13:30

## 2018-06-02 NOTE — PROGRESS NOTE ADULT - ASSESSMENT
Lymphadenopathy with pancytopenia: ?Lymphoma. Lymphadenopathy especially in axilla could also be explained by possible breast Ca recurrence.   --S/P axillary LN biopsy  --has e/o blasts on peripheral flow cytometry. ?lymphoblastic lymphoma, will need BM biopsy. She wants IR to do it. Consulted IR.      Macrocytic Anemia: Has had acute drop again. Denies having bleeding.   --Multifactorial (GI bleeding + ?anemia of chronic disease + mild AIHA)  -- B12 and folate WNL.   --Check CBC and retic  -- Start prednisone 60 mg today for AIHA. Transfuse as needed.     HyperBilirubinemia:   --Predominantly direct bilirubinemia  --sec to hemolysis vs ?ductal obstruction from pancreatic head mass.   --MRCP ordered. Check blood culture, LFTs, INR, CBC, BMP and Mg  --?Cholangitis as pt had low grade fever. Suggest starting IVF and unasyn or rocephin + flagyl. GI evaluation.      DVT/GI ppx: Sequentials only

## 2018-06-02 NOTE — PROGRESS NOTE ADULT - SUBJECTIVE AND OBJECTIVE BOX
INTERVAL HPI/OVERNIGHT EVENTS:    68 yo female patient with PMHx of right breast cancer (Well to moderately differentiated invasive ductal carcinoma ER+, SC+, Her-2 neg, stage T1c, N0, M0) s/p lumpectomy around 1.5 years ago, followed by radiation therapy and currently maintained on anastrazole, history of lower GI bleeding back in January 2018 which was attributed to hemorrhoids s/p hemorrhoidectomy and fissurotomy, currently presented because of worsening generalized weakness and dyspnea on exertion.     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 Patient was d/c uneventfully then presented again after few weeks for BRBPR and significant drop in hb and Colonoscopy 1/2018 for BRBPR by Dr sanchez: poor prep, internal hemorrhoids, skin lesion noted at the buttock, diverticulosis. No blood was seen in colon or TI in all colonoscopies so bleeding was attributed to hemorrhoids s/p hemorrhoidectomy on 1/11/18 by Dr Ocampo with excision of 2 perianal lesions and fissurotomy. Around 2 weeks after, the patient presented again with significant drop in hb and BRBPR and Dr ocampo perfomed a reinforcement of hemorrhoidectomy suture line and rigid sigmoidoscopy.      Colonoscopy 1/2018 for BRBPR by Dr sanchez: poor prep, internal hemorrhoids, skin lesion noted at the buttock, diverticulosis.  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 In ED, patient was found to have severe anemia (Hg 5.8) and positive guaiac, admitted for evaluation. Baseline hb 12-13 in 2017.     FH non significant   usually has 1 normal bm per day, no weight loss     5/23 EGD showed no obvious source of bleeding but showed erosive gastritis and esophagitis   5/24 patient could not receive the capsule endoscopy since she had oral contrast for CT scan being done for investigation for malignancy (hx of breat ca, new LN detected on recent CT). No bms overnight, no other complaints.   5/25 no bms, ct scan suspicious for lymphoma. Since this can explain her lymphoma the capsule endoscopy was cancelled  6/1 GI called again for worsening LFTs and recurrent drop in hb. Note that she received an axillary LN bx to r/o recurrent breast ca, bone marrow biopsy for pancytopenia. Ct abd showing new splenomegaly, x renal lesions, 8 mm pancreatic head lesion   6/2 clinically stable no complaints, pending mri / mrcp, no evidence of bleeding, had 1 normal bm overnight, received 1 u PRBC overnight     ROS wnl except as described above     PAST MEDICAL & SURGICAL HISTORY:  Gastrointestinal bleeding, lower: In january 2018  Breast CA  History of total left hip replacement  Status post right breast lumpectomy      Home Medications:  anastrozole 1 mg oral tablet: 1 tab(s) orally once a day (21 May 2018 16:50)      MEDICATIONS  (STANDING):  ALBUTerol    0.083% 2.5 milliGRAM(s) Nebulizer every 6 hours  anastrozole 1 milliGRAM(s) Oral daily  buDESOnide 160 MICROgram(s)/formoterol 4.5 MICROgram(s) Inhaler 2 Puff(s) Inhalation two times a day  cephalexin 500 milliGRAM(s) Oral three times a day  nicotine -  14 mG/24Hr(s) Patch 1 patch Transdermal daily  nystatin Powder 1 Application(s) Topical two times a day  pantoprazole    Tablet 40 milliGRAM(s) Oral two times a day  predniSONE   Tablet 60 milliGRAM(s) Oral daily  sodium chloride 0.9%. 1000 milliLiter(s) (75 mL/Hr) IV Continuous <Continuous>    MEDICATIONS  (PRN):  acetaminophen   Tablet 650 milliGRAM(s) Oral every 6 hours PRN For Temp greater than 38 C (100.4 F)  guaiFENesin    Syrup 100 milliGRAM(s) Oral every 6 hours PRN Cough  melatonin 5 milliGRAM(s) Oral once PRN Insomnia  morphine  - Injectable 1 milliGRAM(s) IV Push every 4 hours PRN Moderate Pain (4 - 6)  oxyCODONE    IR 5 milliGRAM(s) Oral three times a day PRN Moderate Pain (4 - 6)      Allergies    No Known Allergies    Intolerances            PHYSICAL EXAM:   Vital Signs:  Vital Signs Last 24 Hrs  T(C): 36.5 (02 Jun 2018 06:32), Max: 38.1 (02 Jun 2018 04:18)  T(F): 97.7 (02 Jun 2018 06:32), Max: 100.5 (02 Jun 2018 04:18)  HR: 74 (02 Jun 2018 06:32) (74 - 90)  BP: 110/52 (02 Jun 2018 06:32) (97/53 - 145/67)  BP(mean): --  RR: 17 (02 Jun 2018 06:32) (17 - 20)  SpO2: 98% (02 Jun 2018 06:32) (97% - 98%)  Daily     Daily     GENERAL:  no distress  SKIN: intact  HEENT:  NC/AT,  anicteric  CHEST:   no increased effort, breath sounds clear  HEART:  Regular rhythm  ABDOMEN:  Soft, non-tender, non-distended, normoactive bowel sounds,  no masses ,no hepato-splenomegaly, no signs of chronic liver disease  EXTEREMITIES:  no cyanosis      LABS:                        6.4    2.62  )-----------( 112      ( 02 Jun 2018 10:30 )             18.9       Hemoglobin: 6.4 g/dL (06-02-18 @ 10:30)  Hemoglobin: 5.8 g/dL (06-02-18 @ 01:36)  Hemoglobin: 7.2 g/dL (06-01-18 @ 18:03)  Hemoglobin: 7.4 g/dL (05-31-18 @ 19:04)  Hemoglobin: 6.5 g/dL (05-31-18 @ 09:19)  Hemoglobin: 5.5 g/dL (05-31-18 @ 06:58)      06-01    135  |  98  |  19  ----------------------------<  145<H>  4.6   |  23  |  0.6<L>    Ca    8.1<L>      01 Jun 2018 18:03    TPro  6.3  /  Alb  2.8<L>  /  TBili  2.9<H>  /  DBili  1.5<H>  /  AST  53<H>  /  ALT  53<H>  /  AlkPhos  354<H>  06-02      INR: 1.14 ratio (06-02-18 @ 01:36)  INR: 1.08 ratio (06-01-18 @ 18:03)  INR: 1.18 ratio (05-31-18 @ 09:19)      Alanine Aminotransferase (ALT/SGPT): 53 U/L (06-02-18 @ 01:36)  Alkaline Phosphatase, Serum: 354 U/L (06-02-18 @ 01:36)  Bilirubin Direct, Serum: 1.5 mg/dL (06-02-18 @ 01:36)  Aspartate Aminotransferase (AST/SGOT): 53 U/L (06-02-18 @ 01:36)  Bilirubin Direct, Serum: 1.9 mg/dL (06-01-18 @ 18:03)  Aspartate Aminotransferase (AST/SGOT): 61 U/L (06-01-18 @ 18:03)  Alanine Aminotransferase (ALT/SGPT): 57 U/L (06-01-18 @ 18:03)  Alkaline Phosphatase, Serum: 386 U/L (06-01-18 @ 18:03)  Alanine Aminotransferase (ALT/SGPT): 53 U/L (05-31-18 @ 19:04)  Alkaline Phosphatase, Serum: 405 U/L (05-31-18 @ 19:04)  Aspartate Aminotransferase (AST/SGOT): 46 U/L (05-31-18 @ 19:04)  Bilirubin Direct, Serum: 9.4 mg/dL (05-31-18 @ 19:04)  Alanine Aminotransferase (ALT/SGPT): 52 U/L (05-31-18 @ 09:19)  Bilirubin Direct, Serum: 3.4 mg/dL (05-31-18 @ 09:19)  Alkaline Phosphatase, Serum: 353 U/L (05-31-18 @ 09:19)  Aspartate Aminotransferase (AST/SGOT): 44 U/L (05-31-18 @ 09:19)  Aspartate Aminotransferase (AST/SGOT): 42 U/L (05-31-18 @ 06:58)  Bilirubin Direct, Serum: 3.4 mg/dL (05-31-18 @ 06:58)  Alkaline Phosphatase, Serum: 330 U/L (05-31-18 @ 06:58)  Alanine Aminotransferase (ALT/SGPT): 52 U/L (05-31-18 @ 06:58)            RADIOLOGY & ADDITIONAL TESTS:

## 2018-06-02 NOTE — PROGRESS NOTE ADULT - SUBJECTIVE AND OBJECTIVE BOX
SUBJECTIVE:    Patient is a 67y old Female who presents with a chief complaint of cough, dyspnea on exertion, generalized weakness (21 May 2018 16:39)    Currently admitted to medicine with the primary diagnosis of Anemia     Today is hospital day 12d. This morning she is resting comfortably in bed and reports no new issues or overnight events.     PAST MEDICAL & SURGICAL HISTORY  Gastrointestinal bleeding, lower: In january 2018  Breast CA  History of total left hip replacement  Status post right breast lumpectomy    SOCIAL HISTORY:  Negative for smoking/alcohol/drug use.     ALLERGIES:  No Known Allergies    MEDICATIONS:  STANDING MEDICATIONS  ALBUTerol    0.083% 2.5 milliGRAM(s) Nebulizer every 6 hours  anastrozole 1 milliGRAM(s) Oral daily  buDESOnide 160 MICROgram(s)/formoterol 4.5 MICROgram(s) Inhaler 2 Puff(s) Inhalation two times a day  cephalexin 500 milliGRAM(s) Oral three times a day  nicotine -  14 mG/24Hr(s) Patch 1 patch Transdermal daily  nystatin Powder 1 Application(s) Topical two times a day  pantoprazole    Tablet 40 milliGRAM(s) Oral two times a day  predniSONE   Tablet 60 milliGRAM(s) Oral daily  sodium chloride 0.9%. 1000 milliLiter(s) IV Continuous <Continuous>    PRN MEDICATIONS  acetaminophen   Tablet 650 milliGRAM(s) Oral every 6 hours PRN  guaiFENesin    Syrup 100 milliGRAM(s) Oral every 6 hours PRN  melatonin 5 milliGRAM(s) Oral once PRN  morphine  - Injectable 1 milliGRAM(s) IV Push every 4 hours PRN  oxyCODONE    IR 5 milliGRAM(s) Oral three times a day PRN    VITALS:   T(F): 97.1  HR: 89  BP: 106/54  RR: 18  SpO2: 98%    LABS:                        6.4    2.62  )-----------( 112      ( 02 Jun 2018 10:30 )             18.9     06-02    136  |  98  |  24<H>  ----------------------------<  212<H>  4.4   |  24  |  0.5<L>    Ca    8.1<L>      02 Jun 2018 10:30  Mg     1.9     06-02    TPro  6.5  /  Alb  3.0<L>  /  TBili  8.7<H>  /  DBili  5.4<H>  /  AST  73<H>  /  ALT  58<H>  /  AlkPhos  377<H>  06-02    PT/INR - ( 02 Jun 2018 01:36 )   PT: 12.40 sec;   INR: 1.14 ratio         PTT - ( 02 Jun 2018 01:36 )  PTT:24.8 sec              RADIOLOGY:    PHYSICAL EXAM:  GEN: No acute distress  LUNGS: Clear to auscultation bilaterally   HEART: S1/S2 present. RRR.   ABD: Soft, non-tender, non-distended. Bowel sounds present  EXT: NC/NC/NE/2+PP/RAJAN  NEURO: AAOX3

## 2018-06-02 NOTE — PROGRESS NOTE ADULT - ASSESSMENT
68 yo female patient with PMHx of right breast cancer (Well to moderately differentiated invasive ductal carcinoma ER+, VA+, Her-2 neg, stage T1c, N0, M0) s/p lumpectomy around 1.5 years ago, followed by radiation therapy and currently maintained on anastrazole, history of life-threatening lower GI bleeding 2/2 hemorrhoids s/p hemorrhoidectomy and fissurotomy by Dr Ocampo s/p reinforcement of suture lines in 1/2018 presenting with generalized weakness, dyspnea on exertion and dark stools s/p NSAIDs intake.    *Acute on chronic anemia   no evidence of GI bleeding   EGD 5/23 showing erosive gastritis and esophagitis   likely 2/2 hematologic disorder   c/w cbc q12h   PPI q12h   transfuse as needed  Hematology follow r/o BM suppression  r/o hemolysis    *Worsening LFTs   cholestatic pattern   repeat LFTs showing decreased levels r/o lab error r/o passed stones   r/o mass obstruction of the cbd   r/o infiltration by malignant cells   check MRI/MRCP   daily LFTs     *Pancytopenia   likely multifactorial   anastrozole likely a contributing factor   new suspicion of lymphoma on CT scan   f/u bone marrow and LN boipsies   Hemonc f/u

## 2018-06-02 NOTE — PROGRESS NOTE ADULT - ASSESSMENT
66 yo female patient with PMHx of right breast cancer (Well to moderately differentiated invasive ductal carcinoma ER+, [place]+, Her-2 neg, stage T1c, N0, M0) s/p lumpectomy around 1.5 years ago, followed by radiation therapy and currently maintained on anastrazole, history of lower GI bleeding back in January 2018 which was attributed to hemorrhoids, s/p hemorrhoidectomy and fissurotomy, currently presented because of worsening generalized weakness, dyspnea on exertion and cough. Patient in fact has multiple complaints: 1- She complains of few days of productive cough, clear phlegm, associated with left armpit pain, radiating down her left side, worse with coughing and with deep inspiration. Patient is a former smoker of around 50 pack year, stopped 4 months ago. Denies sore throat, rhinorrhea, fever...2- Patient is also having generalized weakness and exertional shortness of breath, getting worse over the last month or so. 3- Patient is complaining of intermittent headaches recently.  In ED, patient was found to have severe anemia (Hg 5.8) and positive guaiac, admitted for evaluation. (21 May 2018 16:39)    #) Symptomatic anemia with pancytopenia  -pt has elevated LDH, high retic count, positive warm antibodies,  -has history of lower GI bleeding few months ago, and was found to have positive guaiac in ED, no active bleed now  -gastritis on EGD  -keep Hb> 7  -s/p 3 transfusion on admission  -transfused 2 units pRBC on 05/29/18  -Sx following (Dr. Guzman)- s/p L axillary LN bx at [facility] on 05/30/18  -ID following (Dr. John)- keflex 500mg q8h x6 days (started 05/31/18, end date 06/06/18)  -transfused 2 units pRBC on 05/31/18  -capsule endscopy possibly as OP  -Heme/Onc f/u (Dr. Miller)-   -IR (Dr. Hernandez)- s/p bone marrow bx (06/01/18)  -GI f/u (Dr. Martinez)- recs MRCP, capsule endoscopy can be done as OP  -MRCP pending    #) Cough and exertional shortness of breath  -resolved SOB, cough improved  -Patient might have non diagnosed COPD (chronic smoking 50 pack year, stopped only 4 months ago)  -possible acute bronchitis / viral upper resp tract infection    #) History of buttock skin lesion s/p excision during hemorrhoidectomy surgery  -Pathology showing malignancy of undetermined origin, clean margins of excision  -Needs to follow up with oncology and address this issue too    #) Breast cancer s/p lumpectomy and currently on anastrozole  -c/w anastrazole    #) DVT/ GI ppx  -encourage ambulation, sequentials/ protonix    #) Code Status  -full code    #) Dispo  -from home

## 2018-06-02 NOTE — PROGRESS NOTE ADULT - SUBJECTIVE AND OBJECTIVE BOX
Patient is a 67y old  Female who presents with a chief complaint of cough, dyspnea on exertion, generalized weakness (21 May 2018 16:39)      INTERVAL HPI/OVERNIGHT EVENTS: reports feeling better    HPI:  66 yo female patient with PMHx of right breast cancer (Well to moderately differentiated invasive ductal carcinoma ER+, OR+, Her-2 neg, stage T1c, N0, M0) s/p lumpectomy around 1.5 years ago, followed by radiation therapy and currently maintained on anastrazole, history of lower GI bleeding back in January 2018 which was attributed to hemorrhoids, s/p hemorrhoidectomy and fissurotomy, currently presented because of worsening generalized weakness, dyspnea on exertion and cough.  Patient in fact has multiple complaints:  1- She complains of few days of productive cough, clear phlegm, associated with left armpit pain, radiating down her left side, worse with coughing and with deep inspiration. Patient is a former smoker of around 50 pack year, stopped 4 months ago. Denies sore throat, rhinorrhea, fever...  2- Patient is also having generalized weakness and exertional shortness of breath, getting worse over the last month or so.  3- Patient is complaining of intermittent headaches recently    In ED, patient was found to have severe anemia (Hg 5.8) and positive guaiac, admitted for evaluation. (21 May 2018 16:39)      PAST MEDICAL & SURGICAL HISTORY:    MEDICATIONS  (STANDING):  ALBUTerol    0.083% 2.5 milliGRAM(s) Nebulizer every 6 hours  anastrozole 1 milliGRAM(s) Oral daily  buDESOnide 160 MICROgram(s)/formoterol 4.5 MICROgram(s) Inhaler 2 Puff(s) Inhalation two times a day  cephalexin 500 milliGRAM(s) Oral three times a day  nicotine -  14 mG/24Hr(s) Patch 1 patch Transdermal daily  nystatin Powder 1 Application(s) Topical two times a day  pantoprazole    Tablet 40 milliGRAM(s) Oral two times a day  predniSONE   Tablet 60 milliGRAM(s) Oral daily  sodium chloride 0.9%. 1000 milliLiter(s) (75 mL/Hr) IV Continuous <Continuous>    MEDICATIONS  (PRN):  acetaminophen   Tablet 650 milliGRAM(s) Oral every 6 hours PRN For Temp greater than 38 C (100.4 F)  guaiFENesin    Syrup 100 milliGRAM(s) Oral every 6 hours PRN Cough  melatonin 5 milliGRAM(s) Oral once PRN Insomnia  morphine  - Injectable 1 milliGRAM(s) IV Push every 4 hours PRN Moderate Pain (4 - 6)  oxyCODONE    IR 5 milliGRAM(s) Oral three times a day PRN Moderate Pain (4 - 6)    Home Medications:  anastrozole 1 mg oral tablet: 1 tab(s) orally once a day (21 May 2018 16:50)      Vital Signs Last 24 Hrs  T(C): 36.2 (02 Jun 2018 13:30), Max: 38.1 (02 Jun 2018 04:18)  T(F): 97.1 (02 Jun 2018 13:30), Max: 100.5 (02 Jun 2018 04:18)  HR: 89 (02 Jun 2018 13:30) (74 - 89)  BP: 106/54 (02 Jun 2018 13:30) (97/53 - 110/52)  BP(mean): --  RR: 18 (02 Jun 2018 13:30) (17 - 18)  SpO2: 98% (02 Jun 2018 06:32) (97% - 98%)  CAPILLARY BLOOD GLUCOSE          General: NAD, AAO3, slightly jaundiced  CV: S1 S2  Resp: decreased breath sounds at base  GI: NT/ND/S +BS  MS: no clubbing/cyanosis/edema  Neuro: nonfocal    LABS:                        6.7    2.51  )-----------( 122      ( 02 Jun 2018 13:37 )             19.5     06-02    136  |  98  |  24<H>  ----------------------------<  212<H>  4.4   |  24  |  0.5<L>    Ca    8.1<L>      02 Jun 2018 10:30  Mg     1.9     06-02    TPro  6.5  /  Alb  3.0<L>  /  TBili  8.7<H>  /  DBili  5.4<H>  /  AST  73<H>  /  ALT  58<H>  /  AlkPhos  377<H>  06-02    LIVER FUNCTIONS - ( 02 Jun 2018 10:30 )  Alb: 3.0 g/dL / Pro: 6.5 g/dL / ALK PHOS: 377 U/L / ALT: 58 U/L / AST: 73 U/L / GGT: x               PT/INR - ( 02 Jun 2018 01:36 )   PT: 12.40 sec;   INR: 1.14 ratio         PTT - ( 02 Jun 2018 01:36 )  PTT:24.8 sec              Consultant(s) Notes Reviewed:  [x ] YES  [ ] NO    Care Discussed with Consultants/Other Providers/ Housestaff [ x] YES  [ ] NO    Radiology personally reviewed.

## 2018-06-02 NOTE — PROGRESS NOTE ADULT - ASSESSMENT
66 yo female patient with PMHx of right breast cancer (Well to moderately differentiated invasive ductal carcinoma ER+, CA+, Her-2 neg, stage T1c, N0, M0) s/p lumpectomy around 1.5 years ago, followed by radiation therapy and currently maintained on anastrazole, history of lower GI bleeding back in January 2018 which was attributed to hemorrhoids, s/p hemorrhoidectomy and fissurotomy, currently presented because of worsening generalized weakness, dyspnea on exertion and cough. Patient in fact has multiple complaints: 1- She complains of few days of productive cough, clear phlegm, associated with left armpit pain, radiating down her left side, worse with coughing and with deep inspiration. Patient is a former smoker of around 50 pack year, stopped 4 months ago. Denies sore throat, rhinorrhea, fever...2- Patient is also having generalized weakness and exertional shortness of breath, getting worse over the last month or so. 3- Patient is complaining of intermittent headaches recently.  In ED, patient was found to have severe anemia (Hg 5.8) and positive guaiac, admitted for evaluation. (21 May 2018 16:39)    #) Symptomatic anemia with pancytopenia  -pt has elevated LDH, high retic count, positive warm antibodies,  -has history of lower GI bleeding few months ago, and was found to have positive guaiac in ED, no active bleed now  -gastritis on EGD  -keep Hb> 7  -s/p 3 transfusion on admission  -transfused 2 units pRBC on 05/29/18  -Sx following (Dr. Guzman)- s/p L axillary LN bx at [facility] on 05/30/18  -ID following (Dr. John)- keflex 500mg q8h x6 days (started 05/31/18, end date 06/06/18)  -transfused 2 units pRBC on 05/31/18  -capsule endscopy possibly as OP  -Heme/Onc f/u (Dr. Miller)-   -IR (Dr. Hernandez)- s/p bone marrow bx (06/01/18)  -GI f/u (Dr. Martinez)- recs MRCP, capsule endoscopy can be done as OP, also recs CT A/P w/ IV contrast w/ pancreatic protocol  -patient refusing IV line, "sick of being poked"  -MRCP pending    #) Cough and exertional shortness of breath  -resolved SOB, cough improved  -Patient might have non diagnosed COPD (chronic smoking 50 pack year, stopped only 4 months ago)  -possible acute bronchitis / viral upper resp tract infection    #) History of buttock skin lesion s/p excision during hemorrhoidectomy surgery  -Pathology showing malignancy of undetermined origin, clean margins of excision  -Needs to follow up with oncology and address this issue too    #) Breast cancer s/p lumpectomy and currently on anastrozole  -c/w anastrazole    #) DVT/ GI ppx  -encourage ambulation, sequentials/ protonix    #) Code Status  -full code    #) Dispo  -from home

## 2018-06-02 NOTE — PROGRESS NOTE ADULT - SUBJECTIVE AND OBJECTIVE BOX
SUBJECTIVE:    Patient is a 67y old Female who presents with a chief complaint of cough, dyspnea on exertion, generalized weakness (21 May 2018 16:39)    Currently admitted to medicine with the primary diagnosis of Anemia     Today is hospital day 12d. This morning she is resting comfortably in bed and reports no new issues or overnight events.     PAST MEDICAL & SURGICAL HISTORY  Gastrointestinal bleeding, lower: In january 2018  Breast CA  History of total left hip replacement  Status post right breast lumpectomy    SOCIAL HISTORY:  Negative for smoking/alcohol/drug use.     ALLERGIES:  No Known Allergies    MEDICATIONS:  STANDING MEDICATIONS  ALBUTerol    0.083% 2.5 milliGRAM(s) Nebulizer every 6 hours  ampicillin/sulbactam  IVPB 3 Gram(s) IV Intermittent every 6 hours  anastrozole 1 milliGRAM(s) Oral daily  buDESOnide 160 MICROgram(s)/formoterol 4.5 MICROgram(s) Inhaler 2 Puff(s) Inhalation two times a day  nicotine -  14 mG/24Hr(s) Patch 1 patch Transdermal daily  nystatin Powder 1 Application(s) Topical two times a day  pantoprazole    Tablet 40 milliGRAM(s) Oral two times a day  predniSONE   Tablet 60 milliGRAM(s) Oral daily  sodium chloride 0.9%. 1000 milliLiter(s) IV Continuous <Continuous>    PRN MEDICATIONS  acetaminophen   Tablet 650 milliGRAM(s) Oral every 6 hours PRN  guaiFENesin    Syrup 100 milliGRAM(s) Oral every 6 hours PRN  melatonin 5 milliGRAM(s) Oral once PRN  morphine  - Injectable 1 milliGRAM(s) IV Push every 4 hours PRN  oxyCODONE    IR 5 milliGRAM(s) Oral three times a day PRN    VITALS:   T(F): 97.1  HR: 89  BP: 106/54  RR: 18  SpO2: 98%    LABS:                        6.7    2.51  )-----------( 122      ( 02 Jun 2018 13:37 )             19.5     06-02    136  |  98  |  24<H>  ----------------------------<  212<H>  4.4   |  24  |  0.5<L>    Ca    8.1<L>      02 Jun 2018 10:30  Mg     1.9     06-02    TPro  6.5  /  Alb  3.0<L>  /  TBili  8.7<H>  /  DBili  5.4<H>  /  AST  73<H>  /  ALT  58<H>  /  AlkPhos  377<H>  06-02    PT/INR - ( 02 Jun 2018 01:36 )   PT: 12.40 sec;   INR: 1.14 ratio         PTT - ( 02 Jun 2018 01:36 )  PTT:24.8 sec              RADIOLOGY:    PHYSICAL EXAM:  GEN: No acute distress  LUNGS: Clear to auscultation bilaterally   HEART: S1/S2 present. RRR.   ABD: Soft, non-tender, non-distended. Bowel sounds present  EXT: NC/NC/NE/2+PP/RAJAN  NEURO: AAOX3

## 2018-06-03 LAB
ALBUMIN SERPL ELPH-MCNC: 2.8 G/DL — LOW (ref 3.5–5.2)
ALP SERPL-CCNC: 320 U/L — HIGH (ref 30–115)
ALT FLD-CCNC: 55 U/L — HIGH (ref 0–41)
ANION GAP SERPL CALC-SCNC: 14 MMOL/L — SIGNIFICANT CHANGE UP (ref 7–14)
AST SERPL-CCNC: 48 U/L — HIGH (ref 0–41)
BILIRUB DIRECT SERPL-MCNC: 1.6 MG/DL — HIGH (ref 0–0.2)
BILIRUB INDIRECT FLD-MCNC: 1.8 MG/DL — HIGH (ref 0.2–1.2)
BILIRUB SERPL-MCNC: 3.4 MG/DL — HIGH (ref 0.2–1.2)
BUN SERPL-MCNC: 19 MG/DL — SIGNIFICANT CHANGE UP (ref 10–20)
CALCIUM SERPL-MCNC: 7.9 MG/DL — LOW (ref 8.5–10.1)
CHLORIDE SERPL-SCNC: 103 MMOL/L — SIGNIFICANT CHANGE UP (ref 98–110)
CO2 SERPL-SCNC: 22 MMOL/L — SIGNIFICANT CHANGE UP (ref 17–32)
CREAT SERPL-MCNC: 0.5 MG/DL — LOW (ref 0.7–1.5)
GLUCOSE SERPL-MCNC: 218 MG/DL — HIGH (ref 70–99)
HAPTOGLOB SERPL-MCNC: <20 MG/DL — LOW (ref 34–200)
HCT VFR BLD CALC: 17.6 % — LOW (ref 37–47)
HGB BLD-MCNC: 6.2 G/DL — CRITICAL LOW (ref 12–16)
LDH SERPL L TO P-CCNC: 643 — HIGH (ref 50–242)
MAGNESIUM SERPL-MCNC: 2.1 MG/DL — SIGNIFICANT CHANGE UP (ref 1.8–2.4)
MCHC RBC-ENTMCNC: 31.2 PG — HIGH (ref 27–31)
MCHC RBC-ENTMCNC: 35.2 G/DL — SIGNIFICANT CHANGE UP (ref 32–37)
MCV RBC AUTO: 88.4 FL — SIGNIFICANT CHANGE UP (ref 81–99)
NRBC # BLD: 3 /100 WBCS — HIGH (ref 0–0)
PLATELET # BLD AUTO: 117 K/UL — LOW (ref 130–400)
POTASSIUM SERPL-MCNC: 4.1 MMOL/L — SIGNIFICANT CHANGE UP (ref 3.5–5)
POTASSIUM SERPL-SCNC: 4.1 MMOL/L — SIGNIFICANT CHANGE UP (ref 3.5–5)
PROT SERPL-MCNC: 6.4 G/DL — SIGNIFICANT CHANGE UP (ref 6–8)
RBC # BLD: 1.99 M/UL — LOW (ref 4.2–5.4)
RBC # BLD: 1.99 M/UL — LOW (ref 4.2–5.4)
RBC # FLD: 18.5 % — HIGH (ref 11.5–14.5)
RETICS #: 34 K/UL — SIGNIFICANT CHANGE UP (ref 25–125)
RETICS/RBC NFR: 1.7 % — HIGH (ref 0.5–1.5)
SODIUM SERPL-SCNC: 139 MMOL/L — SIGNIFICANT CHANGE UP (ref 135–146)
WBC # BLD: 2.85 K/UL — LOW (ref 4.8–10.8)
WBC # FLD AUTO: 2.85 K/UL — LOW (ref 4.8–10.8)

## 2018-06-03 RX ADMIN — PANTOPRAZOLE SODIUM 40 MILLIGRAM(S): 20 TABLET, DELAYED RELEASE ORAL at 17:52

## 2018-06-03 RX ADMIN — AMPICILLIN SODIUM AND SULBACTAM SODIUM 200 GRAM(S): 250; 125 INJECTION, POWDER, FOR SUSPENSION INTRAMUSCULAR; INTRAVENOUS at 23:09

## 2018-06-03 RX ADMIN — AMPICILLIN SODIUM AND SULBACTAM SODIUM 200 GRAM(S): 250; 125 INJECTION, POWDER, FOR SUSPENSION INTRAMUSCULAR; INTRAVENOUS at 05:11

## 2018-06-03 RX ADMIN — AMPICILLIN SODIUM AND SULBACTAM SODIUM 200 GRAM(S): 250; 125 INJECTION, POWDER, FOR SUSPENSION INTRAMUSCULAR; INTRAVENOUS at 18:13

## 2018-06-03 RX ADMIN — NYSTATIN CREAM 1 APPLICATION(S): 100000 CREAM TOPICAL at 05:13

## 2018-06-03 RX ADMIN — ANASTROZOLE 1 MILLIGRAM(S): 1 TABLET ORAL at 11:20

## 2018-06-03 RX ADMIN — BUDESONIDE AND FORMOTEROL FUMARATE DIHYDRATE 2 PUFF(S): 160; 4.5 AEROSOL RESPIRATORY (INHALATION) at 09:32

## 2018-06-03 RX ADMIN — ALBUTEROL 2.5 MILLIGRAM(S): 90 AEROSOL, METERED ORAL at 13:33

## 2018-06-03 RX ADMIN — PANTOPRAZOLE SODIUM 40 MILLIGRAM(S): 20 TABLET, DELAYED RELEASE ORAL at 05:12

## 2018-06-03 RX ADMIN — AMPICILLIN SODIUM AND SULBACTAM SODIUM 200 GRAM(S): 250; 125 INJECTION, POWDER, FOR SUSPENSION INTRAMUSCULAR; INTRAVENOUS at 03:33

## 2018-06-03 RX ADMIN — Medication 60 MILLIGRAM(S): at 05:11

## 2018-06-03 RX ADMIN — ALBUTEROL 2.5 MILLIGRAM(S): 90 AEROSOL, METERED ORAL at 09:21

## 2018-06-03 RX ADMIN — NYSTATIN CREAM 1 APPLICATION(S): 100000 CREAM TOPICAL at 17:51

## 2018-06-03 RX ADMIN — AMPICILLIN SODIUM AND SULBACTAM SODIUM 200 GRAM(S): 250; 125 INJECTION, POWDER, FOR SUSPENSION INTRAMUSCULAR; INTRAVENOUS at 11:19

## 2018-06-03 NOTE — PROGRESS NOTE ADULT - ASSESSMENT
66 yo female patient with PMHx of right breast cancer (Well to moderately differentiated invasive ductal carcinoma ER+, VA+, Her-2 neg, stage T1c, N0, M0) s/p lumpectomy around 1.5 years ago, followed by radiation therapy and currently maintained on anastrazole, history of lower GI bleeding back in January 2018 which was attributed to hemorrhoids, s/p hemorrhoidectomy and fissurotomy, currently presented because of worsening generalized weakness, dyspnea on exertion and cough. Patient in fact has multiple complaints: 1- She complains of few days of productive cough, clear phlegm, associated with left armpit pain, radiating down her left side, worse with coughing and with deep inspiration. Patient is a former smoker of around 50 pack year, stopped 4 months ago. Denies sore throat, rhinorrhea, fever...2- Patient is also having generalized weakness and exertional shortness of breath, getting worse over the last month or so. 3- Patient is complaining of intermittent headaches recently.  In ED, patient was found to have severe anemia (Hg 5.8) and positive guaiac, admitted for evaluation. (21 May 2018 16:39)    Overall pt doing okay today. hg 6.2 on highdose steroids for Hemolytic Anemia w/up to r/o Malignancy with BM bx, LN Bx reports pending. Transfuse 1 PRBC now and continue with Unasyn 3g IV q6h. Stop IVF. Continue all other care.  Appreciate consultants follow up.   Case d/w pt 35min    #) Symptomatic anemia with pancytopenia  -pt has elevated LDH, high retic count, positive warm antibodies,  -has history of lower GI bleeding few months ago, and was found to have positive guaiac in ED, no active bleed now  -gastritis on EGD  -keep Hb> 7  -s/p 3 transfusion on admission  -transfused 2 units pRBC on 05/29/18  -Sx following (Dr. Guzman)- s/p L axillary LN bx at [facility] on 05/30/18  -ID following (Dr. John)- keflex 500mg q8h x6 days (started 05/31/18, end date 06/06/18)  -transfused 2 units pRBC on 05/31/18  -capsule endscopy possibly as OP  -Heme/Onc f/u (Dr. Miller)-   -IR (Dr. Hernandez)- s/p bone marrow bx (06/01/18)  -GI f/u (Dr. Martinez)- recs MRCP, capsule endoscopy can be done as OP, also recs CT A/P w/ IV contrast w/ pancreatic protocol  -patient refusing IV line, "sick of being poked"  -MRCP pending    #) Cough and exertional shortness of breath  -resolved SOB, cough improved  -Patient might have non diagnosed COPD (chronic smoking 50 pack year, stopped only 4 months ago)  -possible acute bronchitis / viral upper resp tract infection    #) History of buttock skin lesion s/p excision during hemorrhoidectomy surgery  -Pathology showing malignancy of undetermined origin, clean margins of excision  -Needs to follow up with oncology and address this issue too    #) Breast cancer s/p lumpectomy and currently on anastrozole  -c/w anastrazole    #) DVT/ GI ppx  -encourage ambulation, sequentials/ protonix    #) Code Status  -full code    #) Dispo  -from home

## 2018-06-03 NOTE — PROGRESS NOTE ADULT - SUBJECTIVE AND OBJECTIVE BOX
SUBJECTIVE:    Patient is a 67y old Female who presents with a chief complaint of cough, dyspnea on exertion, generalized weakness (21 May 2018 16:39)    Currently admitted to medicine with the primary diagnosis of Anemia     Today is hospital day 13d. This morning she is resting comfortably in bed and reports no new issues or overnight events.     PAST MEDICAL & SURGICAL HISTORY  Gastrointestinal bleeding, lower: In january 2018  Breast CA  History of total left hip replacement  Status post right breast lumpectomy    SOCIAL HISTORY:  Negative for smoking/alcohol/drug use.     ALLERGIES:  No Known Allergies    MEDICATIONS:  STANDING MEDICATIONS  ALBUTerol    0.083% 2.5 milliGRAM(s) Nebulizer every 6 hours  ampicillin/sulbactam  IVPB 3 Gram(s) IV Intermittent every 6 hours  anastrozole 1 milliGRAM(s) Oral daily  buDESOnide 160 MICROgram(s)/formoterol 4.5 MICROgram(s) Inhaler 2 Puff(s) Inhalation two times a day  nicotine -  14 mG/24Hr(s) Patch 1 patch Transdermal daily  nystatin Powder 1 Application(s) Topical two times a day  pantoprazole    Tablet 40 milliGRAM(s) Oral two times a day  predniSONE   Tablet 60 milliGRAM(s) Oral daily  sodium chloride 0.9%. 1000 milliLiter(s) IV Continuous <Continuous>    PRN MEDICATIONS  acetaminophen   Tablet 650 milliGRAM(s) Oral every 6 hours PRN  guaiFENesin    Syrup 100 milliGRAM(s) Oral every 6 hours PRN  melatonin 5 milliGRAM(s) Oral once PRN  morphine  - Injectable 1 milliGRAM(s) IV Push every 4 hours PRN  oxyCODONE    IR 5 milliGRAM(s) Oral three times a day PRN    VITALS:   T(C): 36.2 (03 Jun 2018 04:32), Max: 36.2 (02 Jun 2018 13:30)  T(F): 97.2 (03 Jun 2018 04:32), Max: 97.2 (03 Jun 2018 04:32)  HR: 75 (03 Jun 2018 04:32) (75 - 89)  BP: 124/60 (03 Jun 2018 04:32) (98/50 - 124/60)  RR: 18 (03 Jun 2018 04:32) (18 - 18)    LABS:                          6.2    2.85  )-----------( 117      ( 03 Jun 2018 07:03 )             17.6                         6.7    2.51  )-----------( 122      ( 02 Jun 2018 13:37 )             19.5     06-03    139  |  103  |  19  ----------------------------<  218<H>  4.1   |  22  |  0.5<L>    Ca    7.9<L>      03 Jun 2018 07:03  Mg     2.1     06-03    TPro  6.4  /  Alb  2.8<L>  /  TBili  3.4<H>  /  DBili  1.6<H>  /  AST  48<H>  /  ALT  55<H>  /  AlkPhos  320<H>  06-03 06-02    RADIOLOGY:    PHYSICAL EXAM:  GEN: No acute distress  LUNGS: Clear to auscultation bilaterally   HEART: S1/S2 present. RRR.   ABD: Soft, non-tender, non-distended. Bowel sounds present  EXT: NC/NC/NE/2+PP/RAJAN  NEURO: AAOX3

## 2018-06-04 LAB
ALBUMIN SERPL ELPH-MCNC: 2.8 G/DL — LOW (ref 3.5–5.2)
ALLERGY+IMMUNOLOGY DIAG STUDY NOTE: SIGNIFICANT CHANGE UP
ALP SERPL-CCNC: 244 U/L — HIGH (ref 30–115)
ALT FLD-CCNC: 49 U/L — HIGH (ref 0–41)
ANION GAP SERPL CALC-SCNC: 12 MMOL/L — SIGNIFICANT CHANGE UP (ref 7–14)
AST SERPL-CCNC: 35 U/L — SIGNIFICANT CHANGE UP (ref 0–41)
BASOPHILS # BLD AUTO: 0 K/UL — SIGNIFICANT CHANGE UP (ref 0–0.2)
BASOPHILS NFR BLD AUTO: 0 % — SIGNIFICANT CHANGE UP (ref 0–1)
BILIRUB DIRECT SERPL-MCNC: 1 MG/DL — HIGH (ref 0–0.2)
BILIRUB INDIRECT FLD-MCNC: 1.3 MG/DL — HIGH (ref 0.2–1.2)
BILIRUB SERPL-MCNC: 2.3 MG/DL — HIGH (ref 0.2–1.2)
BLD GP AB SCN SERPL QL: (no result)
BUN SERPL-MCNC: 21 MG/DL — HIGH (ref 10–20)
CALCIUM SERPL-MCNC: 8.1 MG/DL — LOW (ref 8.5–10.1)
CHLORIDE SERPL-SCNC: 102 MMOL/L — SIGNIFICANT CHANGE UP (ref 98–110)
CO2 SERPL-SCNC: 23 MMOL/L — SIGNIFICANT CHANGE UP (ref 17–32)
CREAT SERPL-MCNC: 0.6 MG/DL — LOW (ref 0.7–1.5)
EOSINOPHIL # BLD AUTO: 0.03 K/UL — SIGNIFICANT CHANGE UP (ref 0–0.7)
EOSINOPHIL NFR BLD AUTO: 1.1 % — SIGNIFICANT CHANGE UP (ref 0–8)
GLUCOSE SERPL-MCNC: 159 MG/DL — HIGH (ref 70–99)
HAPTOGLOB SERPL-MCNC: <20 MG/DL — LOW (ref 34–200)
HCT VFR BLD CALC: 19.2 % — LOW (ref 37–47)
HGB BLD-MCNC: 6.6 G/DL — CRITICAL LOW (ref 12–16)
IMM GRANULOCYTES NFR BLD AUTO: 21.4 % — HIGH (ref 0.1–0.3)
LDH SERPL L TO P-CCNC: 564 — HIGH (ref 50–242)
LYMPHOCYTES # BLD AUTO: 0.55 K/UL — LOW (ref 1.2–3.4)
LYMPHOCYTES # BLD AUTO: 21 % — SIGNIFICANT CHANGE UP (ref 20.5–51.1)
MAGNESIUM SERPL-MCNC: 2 MG/DL — SIGNIFICANT CHANGE UP (ref 1.8–2.4)
MCHC RBC-ENTMCNC: 30.3 PG — SIGNIFICANT CHANGE UP (ref 27–31)
MCHC RBC-ENTMCNC: 34.4 G/DL — SIGNIFICANT CHANGE UP (ref 32–37)
MCV RBC AUTO: 88.1 FL — SIGNIFICANT CHANGE UP (ref 81–99)
MONOCYTES # BLD AUTO: 0.13 K/UL — SIGNIFICANT CHANGE UP (ref 0.1–0.6)
MONOCYTES NFR BLD AUTO: 5 % — SIGNIFICANT CHANGE UP (ref 1.7–9.3)
NEUTROPHILS # BLD AUTO: 1.35 K/UL — LOW (ref 1.4–6.5)
NEUTROPHILS NFR BLD AUTO: 51.5 % — SIGNIFICANT CHANGE UP (ref 42.2–75.2)
NRBC # BLD: 6 /100 WBCS — HIGH (ref 0–0)
PLATELET # BLD AUTO: 128 K/UL — LOW (ref 130–400)
POTASSIUM SERPL-MCNC: 4.1 MMOL/L — SIGNIFICANT CHANGE UP (ref 3.5–5)
POTASSIUM SERPL-SCNC: 4.1 MMOL/L — SIGNIFICANT CHANGE UP (ref 3.5–5)
PROT SERPL-MCNC: 6.3 G/DL — SIGNIFICANT CHANGE UP (ref 6–8)
RBC # BLD: 2.18 M/UL — LOW (ref 4.2–5.4)
RBC # BLD: 2.18 M/UL — LOW (ref 4.2–5.4)
RBC # FLD: 17.6 % — HIGH (ref 11.5–14.5)
RETICS #: 41.2 K/UL — SIGNIFICANT CHANGE UP (ref 25–125)
RETICS/RBC NFR: 1.9 % — HIGH (ref 0.5–1.5)
SODIUM SERPL-SCNC: 137 MMOL/L — SIGNIFICANT CHANGE UP (ref 135–146)
TYPE + AB SCN PNL BLD: SIGNIFICANT CHANGE UP
WBC # BLD: 2.62 K/UL — LOW (ref 4.8–10.8)
WBC # FLD AUTO: 2.62 K/UL — LOW (ref 4.8–10.8)

## 2018-06-04 RX ORDER — PREGABALIN 225 MG/1
1000 CAPSULE ORAL DAILY
Qty: 0 | Refills: 0 | Status: DISCONTINUED | OUTPATIENT
Start: 2018-06-04 | End: 2018-06-07

## 2018-06-04 RX ADMIN — AMPICILLIN SODIUM AND SULBACTAM SODIUM 200 GRAM(S): 250; 125 INJECTION, POWDER, FOR SUSPENSION INTRAMUSCULAR; INTRAVENOUS at 05:30

## 2018-06-04 RX ADMIN — ALBUTEROL 2.5 MILLIGRAM(S): 90 AEROSOL, METERED ORAL at 08:20

## 2018-06-04 RX ADMIN — NYSTATIN CREAM 1 APPLICATION(S): 100000 CREAM TOPICAL at 17:54

## 2018-06-04 RX ADMIN — ANASTROZOLE 1 MILLIGRAM(S): 1 TABLET ORAL at 12:14

## 2018-06-04 RX ADMIN — BUDESONIDE AND FORMOTEROL FUMARATE DIHYDRATE 2 PUFF(S): 160; 4.5 AEROSOL RESPIRATORY (INHALATION) at 21:19

## 2018-06-04 RX ADMIN — AMPICILLIN SODIUM AND SULBACTAM SODIUM 200 GRAM(S): 250; 125 INJECTION, POWDER, FOR SUSPENSION INTRAMUSCULAR; INTRAVENOUS at 12:14

## 2018-06-04 RX ADMIN — MORPHINE SULFATE 1 MILLIGRAM(S): 50 CAPSULE, EXTENDED RELEASE ORAL at 23:51

## 2018-06-04 RX ADMIN — MORPHINE SULFATE 1 MILLIGRAM(S): 50 CAPSULE, EXTENDED RELEASE ORAL at 21:54

## 2018-06-04 RX ADMIN — NYSTATIN CREAM 1 APPLICATION(S): 100000 CREAM TOPICAL at 05:35

## 2018-06-04 RX ADMIN — AMPICILLIN SODIUM AND SULBACTAM SODIUM 200 GRAM(S): 250; 125 INJECTION, POWDER, FOR SUSPENSION INTRAMUSCULAR; INTRAVENOUS at 23:23

## 2018-06-04 RX ADMIN — OXYCODONE HYDROCHLORIDE 5 MILLIGRAM(S): 5 TABLET ORAL at 21:22

## 2018-06-04 RX ADMIN — PANTOPRAZOLE SODIUM 40 MILLIGRAM(S): 20 TABLET, DELAYED RELEASE ORAL at 05:30

## 2018-06-04 RX ADMIN — OXYCODONE HYDROCHLORIDE 5 MILLIGRAM(S): 5 TABLET ORAL at 21:56

## 2018-06-04 RX ADMIN — Medication 60 MILLIGRAM(S): at 05:30

## 2018-06-04 NOTE — PROGRESS NOTE ADULT - ASSESSMENT
Lymphadenopathy with pancytopenia: ?Lymphoma. Lymphadenopathy especially in axilla could also be explained by possible breast Ca recurrence.   --S/P axillary LN biopsy and bone marrow biopsy  --Awaiting pathology.      Macrocytic Anemia: Has had acute drop again. Denies having bleeding.   --Multifactorial (GI bleeding + ?anemia of chronic disease + mild AIHA)  -- B12 and folate WNL. MMA elevated. B12 1000mcg daily x 7days followed by once a week followed by once a month.  --Check CBC and retic  -- Continue prednisone 60 mg  for AIHA. Transfuse as needed.     HyperBilirubinemia:   --Predominantly direct bilirubinemia  --sec to hemolysis vs ?ductal obstruction from pancreatic head mass.   --MRCP ordered. Check blood culture, LFTs, INR, CBC, BMP and Mg  --?Cholangitis as pt had low grade fever. On Unasyn. GI evaluation.      DVT/GI ppx: Sequentials only

## 2018-06-04 NOTE — PROGRESS NOTE ADULT - ASSESSMENT
#) hemolytic anemia with pancytopenia  -current hemoglobin is 6.6  -pt has elevated LDH, high retic count, positive warm antibodies,  -has history of lower GI bleeding few months ago, and was found to have positive guaiac in ED, no active bleed now, s/p 3 transfusion on admission  -transfused 2 units pRBC on 05/29/18  -Sx following (Dr. Guzman)- s/p L axillary LN bx at [facility] on 05/30/18  -ID following (Dr. John)- IV Unasyn 3g q6h   -transfused 2 units pRBC on 05/31/18  -capsule endscopy possibly as OP  -Heme/Onc f/u (Dr. Miller)- prednisone 60 mg today for AIHA. Transfuse as needed. Awaiting biopsy results   -IR (Dr. Hernandez)- s/p bone marrow bx (06/01/18)  -GI f/u (Dr. Martinez)- recs MRCP, capsule endoscopy can be done as OP, also recs CT A/P w/ IV contrast w/ pancreatic protocol  -MRCP today    #) Cough and exertional shortness of breath  -resolved SOB, cough improved  -Patient might have non diagnosed COPD (chronic smoking 50 pack year, stopped only 4 months ago)  -possible acute bronchitis / viral upper resp tract infection    #) History of buttock skin lesion s/p excision during hemorrhoidectomy surgery  -Pathology showing malignancy of undetermined origin, clean margins of excision  -Needs to follow up with oncology and address this issue too    #) Breast cancer s/p lumpectomy and currently on anastrozole  -c/w anastrazole    #) DVT/ GI ppx  -encourage ambulation, sequentials/ protonix    #) Code Status  -full code    #) Dispo  -from home #) hemolytic anemia with pancytopenia  -current hemoglobin is 6.6, transfuse one unit and check AM hgb  -pt has elevated LDH, high retic count, positive warm antibodies,  -has history of lower GI bleeding few months ago, and was found to have positive guaiac in ED, no active bleed now, s/p 3 transfusion on admission  -transfused 2 units pRBC on 05/29/18  -Sx following (Dr. Guzman)- s/p L axillary LN bx at [facility] on 05/30/18  -ID following (Dr. John)- IV Unasyn 3g q6h   -transfused 2 units pRBC on 05/31/18  -capsule endscopy possibly as OP  -Heme/Onc f/u (Dr. Miller)- prednisone 60 mg today for AIHA. Transfuse as needed. Awaiting biopsy results   -IR (Dr. Hernandez)- s/p bone marrow bx (06/01/18)  -GI f/u (Dr. Martinez)- recs MRCP, capsule endoscopy can be done as OP, also recs CT A/P w/ IV contrast w/ pancreatic protocol  -MRCP today    #) Cough and exertional shortness of breath  -resolved SOB, cough improved  -Patient might have non diagnosed COPD (chronic smoking 50 pack year, stopped only 4 months ago)  -possible acute bronchitis / viral upper resp tract infection    #) History of buttock skin lesion s/p excision during hemorrhoidectomy surgery  -Pathology showing malignancy of undetermined origin, clean margins of excision  -Needs to follow up with oncology and address this issue too    #) Breast cancer s/p lumpectomy and currently on anastrozole  -c/w anastrazole    #) DVT/ GI ppx  -encourage ambulation, sequentials/ protonix    #) Code Status  -full code    #) Dispo  -from home #) hemolytic anemia with pancytopenia  -current hemoglobin is 6.6, transfuse one unit and check AM hgb  -pt has elevated LDH, high retic count, positive warm antibodies,  -has history of lower GI bleeding few months ago, and was found to have positive guaiac in ED, no active bleed now, s/p 3 transfusion on admission  -transfused 2 units pRBC on 05/29/18  -Sx following (Dr. Guzman)- s/p L axillary LN bx at [facility] on 05/30/18  -ID following (Dr. John)- IV Unasyn 3g q6h   -transfused 2 units pRBC on 05/31/18  -capsule endscopy possibly as OP  -Heme/Onc f/u (Dr. Miller)- prednisone 60 mg today for AIHA. Transfuse as needed. Awaiting biopsy results   -IR (Dr. Hernandez)- s/p bone marrow bx (06/01/18)  -GI f/u (Dr. Martinez)-  MRCP.  -MRCP today    #) Cough and exertional shortness of breath  -resolved SOB, cough improved  -Patient might have non diagnosed COPD (chronic smoking 50 pack year, stopped only 4 months ago)  -possible acute bronchitis / viral upper resp tract infection    #) History of buttock skin lesion s/p excision during hemorrhoidectomy surgery  -Pathology showing malignancy of undetermined origin, clean margins of excision  -Needs to follow up with oncology and address this issue too    #) Breast cancer s/p lumpectomy and currently on anastrozole  -c/w anastrazole    #) DVT/ GI ppx  -encourage ambulation, sequentials/ protonix    #) Code Status  -full code    #) Dispo  -from home     Attending Attestation  Pt seen and examined. Case and Plan discussed at rounds. Agree with above note as corrected. Plan: MRCP, Transfuse 1 PRBC for hg 6.6 and continue Steroids, Continue IV Unasyn. Follow Blood cxs and LFTs/Hemolysis labs. Will follow. D/W Patient. Time 25min. #) hemolytic anemia with pancytopenia  -current hemoglobin is 6.6, transfuse one unit and check AM hgb  -pt has elevated LDH, high retic count, positive warm antibodies,  -has history of lower GI bleeding few months ago, and was found to have positive guaiac in ED, no active bleed now, s/p 3 transfusion on admission  -transfused 2 units pRBC on 05/29/18  -Sx following (Dr. Guzman)- s/p L axillary LN bx at [facility] on 05/30/18  -ID following (Dr. John)- IV Unasyn 3g q6h   -transfused 2 units pRBC on 05/31/18  -capsule endscopy possibly as OP  -Heme/Onc f/u (Dr. Miller)- prednisone 60 mg today for AIHA. Transfuse as needed. Awaiting biopsy results   -IR (Dr. Hernandez)- s/p bone marrow bx (06/01/18)  -GI f/u (Dr. Martinez)-  MRCP.  -MRCP today    #) Cough and exertional shortness of breath  -resolved SOB, cough improved  -Patient might have non diagnosed COPD (chronic smoking 50 pack year, stopped only 4 months ago)  -possible acute bronchitis / viral upper resp tract infection    #) History of buttock skin lesion s/p excision during hemorrhoidectomy surgery  -Pathology showing malignancy of undetermined origin, clean margins of excision  -Needs to follow up with oncology and address this issue too    #) Breast cancer s/p lumpectomy and currently on anastrozole  -c/w anastrazole    #) DVT/ GI ppx  -encourage ambulation, sequentials/ protonix    #) Code Status  -full code    #) Dispo  -from home     Attending Attestation  Pt seen and examined. Case and Plan discussed at rounds. Agree with above note as corrected. Plan: MRCP, Transfuse 1 PRBC for hg 6.6 and continue Steroids, Continue IV Unasyn. Follow Blood cxs and LFTs/Hemolysis labs. Will follow. D/W Patient. Time 25min.

## 2018-06-04 NOTE — CHART NOTE - NSCHARTNOTEFT_GEN_A_CORE
Registered Dietitian Limited Follow-Up:    Pertinent Medical Information: Lymphadenopathy with pancytopenia: ?Lymphoma. Lymphadenopathy especially in axilla could also be explained by possible breast Ca recurrence - s/p biopsy, awaiting pathology. MRCP today. History of buttock skin lesion s/p excision during hemorrhoidectomy surgery - Pathology showing malignancy of undetermined origin, clean margins of excision. Cough and exertional shortness of breath - possible acute bronchitis / viral upper resp tract infection. Resolved SOB, cough improved per progress notes.    Pertinent Subjective Information: Good appetite & po intake reported. Po intake today reportedly 75%. No preferences requested. Wt remains 56.7 kg (6/2). Labs/meds reviewed. No nutrition intervention at this time; will reassess in 7 days.

## 2018-06-04 NOTE — PROGRESS NOTE ADULT - SUBJECTIVE AND OBJECTIVE BOX
Subjective and Objective: 	  INTERVAL HPI/OVERNIGHT EVENTS:    68 yo female patient with PMHx of right breast cancer (Well to moderately differentiated invasive ductal carcinoma ER+, MN+, Her-2 neg, stage T1c, N0, M0) s/p lumpectomy around 1.5 years ago, followed by radiation therapy and currently maintained on anastrazole, history of lower GI bleeding back in January 2018 which was attributed to hemorrhoids s/p hemorrhoidectomy and fissurotomy, currently presented because of worsening generalized weakness and dyspnea on exertion. Upon today's interview, patient denies dyspnea, malaise, or pain. Patient reports that she has had a BM this morning where when she wiped herself, she noted it was yellow. However, the stool was reported of regular color without any blood.       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 Patient was d/c uneventfully then presented again after few weeks for BRBPR and significant drop in hb and Colonoscopy 1/2018 for BRBPR by Dr sanchez: poor prep, internal hemorrhoids, skin lesion noted at the buttock, diverticulosis. No blood was seen in colon or TI in all colonoscopies so bleeding was attributed to hemorrhoids s/p hemorrhoidectomy on 1/11/18 by Dr Ocampo with excision of 2 perianal lesions and fissurotomy. Around 2 weeks after, the patient presented again with significant drop in hb and BRBPR and Dr ocampo perfomed a reinforcement of hemorrhoidectomy suture line and rigid sigmoidoscopy.      Colonoscopy 1/2018 for BRBPR by Dr sanchez: poor prep, internal hemorrhoids, skin lesion noted at the buttock, diverticulosis.  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 In ED, patient was found to have severe anemia (Hg 5.8) and positive guaiac, admitted for evaluation. Baseline hb 12-13 in 2017.     FH non significant   usually has 1 normal bm per day, no weight loss   5/23 EGD showed no obvious source of bleeding but showed erosive gastritis and esophagitis   5/24 patient could not receive the capsule endoscopy since she had oral contrast for CT scan being done for investigation for malignancy (hx of breat ca, new LN detected on recent CT). No bms overnight, no other complaints.   5/25 no bms, ct scan suspicious for lymphoma. Since this can explain her lymphoma the capsule endoscopy was cancelled  6/1 GI called again for worsening LFTs and recurrent drop in hb. Note that she received an axillary LN bx to r/o recurrent breast ca, bone marrow biopsy for pancytopenia. Ct abd showing new splenomegaly, x renal lesions, 8 mm pancreatic head lesion   6/2 clinically stable no complaints, pending mri / mrcp, no evidence of bleeding, had 1 normal bm overnight, received 1 u PRBC overnight   6/4 no new complaints. No evidence of bleeding. had 1 bm morning that was "yellow", received 1 u PRNC overnight    ROS is wnl except stated in the HPI.    PAST MEDICAL & SURGICAL HISTORY:  Gastrointestinal bleeding, lower: In january 2018  Breast CA  History of total left hip replacement  Status post right breast lumpectomy    Home Medications:  anastrozole 1 mg oral tablet: 1 tab(s) orally once a day (21 May 2018 16:50)    MEDICATIONS  (STANDING):  ALBUTerol    0.083% 2.5 milliGRAM(s) Nebulizer every 6 hours  ampicillin/sulbactam  IVPB 3 Gram(s) IV Intermittent every 6 hours  anastrozole 1 milliGRAM(s) Oral daily  buDESOnide 160 MICROgram(s)/formoterol 4.5 MICROgram(s) Inhaler 2 Puff(s) Inhalation two times a day  nicotine -  14 mG/24Hr(s) Patch 1 patch Transdermal daily  nystatin Powder 1 Application(s) Topical two times a day  pantoprazole    Tablet 40 milliGRAM(s) Oral two times a day  predniSONE   Tablet 60 milliGRAM(s) Oral daily    MEDICATIONS  (PRN):  acetaminophen   Tablet 650 milliGRAM(s) Oral every 6 hours PRN For Temp greater than 38 C (100.4 F)  guaiFENesin    Syrup 100 milliGRAM(s) Oral every 6 hours PRN Cough  melatonin 5 milliGRAM(s) Oral once PRN Insomnia  morphine  - Injectable 1 milliGRAM(s) IV Push every 4 hours PRN Moderate Pain (4 - 6)  oxyCODONE    IR 5 milliGRAM(s) Oral three times a day PRN Moderate Pain (4 - 6)    Allergy: NKDA.      Physical Exam:  Vital Signs Last 24 Hrs  T(C): 36.3 (04 Jun 2018 04:54), Max: 36.3 (04 Jun 2018 04:54)  T(F): 97.4 (04 Jun 2018 04:54), Max: 97.4 (04 Jun 2018 04:54)  HR: 69 (04 Jun 2018 04:54) (69 - 87)  BP: 139/64 (04 Jun 2018 04:54) (121/69 - 139/64)  BP(mean): --  RR: 17 (04 Jun 2018 04:54) (17 - 18)  SpO2: 99% (03 Jun 2018 20:32) (99% - 99%) I&O's Detail    GENERAL:  no distress  SKIN: intact  HEENT:  NC/AT,  anicteric  CHEST:   no increased effort, breath sounds clear  HEART:  Regular rhythm, no murmur, rub, or gallop.   ABDOMEN:  Soft, non-tender, non-distended, normoactive bowel sounds in all 4 quadrants,  no masses ,no hepato-splenomegaly, no signs of chronic liver disease. Percussion equal bilaterally.  EXTEREMITIES:  no cyanosis        LABS:                        6.6    2.62  )-----------( 128      ( 04 Jun 2018 06:35 )             19.2     Hemoglobin: 6.4 g/dL (06-02-18 @ 10:30)  Hemoglobin: 5.8 g/dL (06-02-18 @ 01:36)  Hemoglobin: 7.2 g/dL (06-01-18 @ 18:03)  Hemoglobin: 7.4 g/dL (05-31-18 @ 19:04)  Hemoglobin: 6.5 g/dL (05-31-18 @ 09:19)  Hemoglobin: 5.5 g/dL (05-31-18 @ 06:58)    06-04  137  |  102  |  21<H>  ----------------------------<  159<H>  4.1   |  23  |  0.6<L>    Ca    8.1<L>      04 Jun 2018 06:35  Mg     2.0     06-04    TPro  6.3  /  Alb  2.8<L>  /  TBili  2.3<H>  /  DBili  1.0<H>  /  AST  35  /  ALT  49<H>  /  AlkPhos  244<H>  06-04    INR: 1.14 ratio (06-02-18 @ 01:36)  INR: 1.08 ratio (06-01-18 @ 18:03)  INR: 1.18 ratio (05-31-18 @ 09:19)    Alanine Aminotransferase (ALT/SGPT): 53 U/L (06-02-18 @ 01:36)  Alkaline Phosphatase, Serum: 354 U/L (06-02-18 @ 01:36)  Bilirubin Direct, Serum: 1.5 mg/dL (06-02-18 @ 01:36)  Aspartate Aminotransferase (AST/SGOT): 53 U/L (06-02-18 @ 01:36)  Bilirubin Direct, Serum: 1.9 mg/dL (06-01-18 @ 18:03)  Aspartate Aminotransferase (AST/SGOT): 61 U/L (06-01-18 @ 18:03)  Alanine Aminotransferase (ALT/SGPT): 57 U/L (06-01-18 @ 18:03)  Alkaline Phosphatase, Serum: 386 U/L (06-01-18 @ 18:03)  Alanine Aminotransferase (ALT/SGPT): 53 U/L (05-31-18 @ 19:04)  Alkaline Phosphatase, Serum: 405 U/L (05-31-18 @ 19:04)  Aspartate Aminotransferase (AST/SGOT): 46 U/L (05-31-18 @ 19:04)  Bilirubin Direct, Serum: 9.4 mg/dL (05-31-18 @ 19:04)  Alanine Aminotransferase (ALT/SGPT): 52 U/L (05-31-18 @ 09:19)  Bilirubin Direct, Serum: 3.4 mg/dL (05-31-18 @ 09:19)  Alkaline Phosphatase, Serum: 353 U/L (05-31-18 @ 09:19)  Aspartate Aminotransferase (AST/SGOT): 44 U/L (05-31-18 @ 09:19)  Aspartate Aminotransferase (AST/SGOT): 42 U/L (05-31-18 @ 06:58)  Bilirubin Direct, Serum: 3.4 mg/dL (05-31-18 @ 06:58)  Alkaline Phosphatase, Serum: 330 U/L (05-31-18 @ 06:58)  Alanine Aminotransferase (ALT/SGPT): 52 U/L (05-31-18 @ 06:58)    Assessment and Plan:  		  68 yo female patient with PMHx of right breast cancer (Well to moderately differentiated invasive ductal carcinoma ER+, MN+, Her-2 neg, stage T1c, N0, M0) s/p lumpectomy around 1.5 years ago, followed by radiation therapy and currently maintained on anastrazole, history of life-threatening lower GI bleeding 2/2 hemorrhoids s/p hemorrhoidectomy and fissurotomy by Dr Ocampo s/p reinforcement of suture lines in 1/2018 presenting with generalized weakness, dyspnea on exertion and dark stools s/p NSAIDs intake.    *Acute on chronic anemia   no evidence of GI bleeding   EGD 5/23 showing erosive gastritis and esophagitis   likely 2/2 hematologic disorder   c/w cbc q12h   PPI q12h   transfuse as needed  Hematology follow r/o BM suppression  r/o hemolysis, BM bx result pending.   L axillary LN biopsy result pending (hx of breast cancer s/p lumpectomy)    *Worsening LFTs   cholestatic pattern   repeat LFTs showing decreased levels r/o lab error r/o passed stones   r/o mass obstruction of the cbd   r/o infiltration by malignant cells   check MRI/MRCP   daily LFTs  6/4 LFT is improving with a decrease in direct bilirubin, AST, and ALT.      *Pancytopenia   likely multifactorial   anastrozole likely a contributing factor   new suspicion of lymphoma on CT scan,   f/u bone marrow and LN boipsies, both results pending.  Hemonc f/u

## 2018-06-04 NOTE — PROGRESS NOTE ADULT - SUBJECTIVE AND OBJECTIVE BOX
SUBJECTIVE:    Patient is a 67y old Female who presents with a chief complaint of cough, dyspnea on exertion, generalized weakness (21 May 2018 16:39)    Currently admitted to medicine with the primary diagnosis of Anemia     Today is hospital day 14d. This morning she is resting comfortably in bed and reports no new issues or overnight events.     PAST MEDICAL & SURGICAL HISTORY  Gastrointestinal bleeding, lower: In january 2018  Breast CA  History of total left hip replacement  Status post right breast lumpectomy    SOCIAL HISTORY:  Negative for smoking/alcohol/drug use.     ALLERGIES:  No Known Allergies    MEDICATIONS:  STANDING MEDICATIONS  ALBUTerol    0.083% 2.5 milliGRAM(s) Nebulizer every 6 hours  ampicillin/sulbactam  IVPB 3 Gram(s) IV Intermittent every 6 hours  anastrozole 1 milliGRAM(s) Oral daily  buDESOnide 160 MICROgram(s)/formoterol 4.5 MICROgram(s) Inhaler 2 Puff(s) Inhalation two times a day  cyanocobalamin Injectable 1000 MICROGram(s) IntraMuscular daily  nicotine -  14 mG/24Hr(s) Patch 1 patch Transdermal daily  nystatin Powder 1 Application(s) Topical two times a day  pantoprazole    Tablet 40 milliGRAM(s) Oral two times a day  predniSONE   Tablet 60 milliGRAM(s) Oral daily    PRN MEDICATIONS  acetaminophen   Tablet 650 milliGRAM(s) Oral every 6 hours PRN  guaiFENesin    Syrup 100 milliGRAM(s) Oral every 6 hours PRN  melatonin 5 milliGRAM(s) Oral once PRN  morphine  - Injectable 1 milliGRAM(s) IV Push every 4 hours PRN  oxyCODONE    IR 5 milliGRAM(s) Oral three times a day PRN    VITALS:   T(F): 96.5  HR: 80  BP: 157/70  RR: 18  SpO2: 99%    LABS:                        6.6    2.62  )-----------( 128      ( 04 Jun 2018 06:35 )             19.2     06-04    137  |  102  |  21<H>  ----------------------------<  159<H>  4.1   |  23  |  0.6<L>    Ca    8.1<L>      04 Jun 2018 06:35  Mg     2.0     06-04    TPro  6.3  /  Alb  2.8<L>  /  TBili  2.3<H>  /  DBili  1.0<H>  /  AST  35  /  ALT  49<H>  /  AlkPhos  244<H>  06-04                  RADIOLOGY:    PHYSICAL EXAM:  GEN: No acute distress  LUNGS: Clear to auscultation bilaterally   HEART: S1/S2 present. RRR.   ABD: Soft, non-tender, non-distended. Bowel sounds present  EXT: NC/NC/NE/2+PP/RAJAN  NEURO: AAOX3

## 2018-06-04 NOTE — PROGRESS NOTE ADULT - SUBJECTIVE AND OBJECTIVE BOX
SUBJECTIVE:    Patient is a 67y old Female who presents with a chief complaint of cough, dyspnea on exertion, generalized weakness (21 May 2018 16:39)    Currently admitted to medicine with the primary diagnosis of Anemia     Today is hospital day 14d. This morning she is resting comfortably in bed and reports no new issues or overnight events.     PAST MEDICAL & SURGICAL HISTORY  Gastrointestinal bleeding, lower: In january 2018  Breast CA  History of total left hip replacement  Status post right breast lumpectomy    SOCIAL HISTORY:  Negative for smoking/alcohol/drug use.     ALLERGIES:  No Known Allergies    MEDICATIONS:  STANDING MEDICATIONS  ALBUTerol    0.083% 2.5 milliGRAM(s) Nebulizer every 6 hours  ampicillin/sulbactam  IVPB 3 Gram(s) IV Intermittent every 6 hours  anastrozole 1 milliGRAM(s) Oral daily  buDESOnide 160 MICROgram(s)/formoterol 4.5 MICROgram(s) Inhaler 2 Puff(s) Inhalation two times a day  nicotine -  14 mG/24Hr(s) Patch 1 patch Transdermal daily  nystatin Powder 1 Application(s) Topical two times a day  pantoprazole    Tablet 40 milliGRAM(s) Oral two times a day  predniSONE   Tablet 60 milliGRAM(s) Oral daily    PRN MEDICATIONS  acetaminophen   Tablet 650 milliGRAM(s) Oral every 6 hours PRN  guaiFENesin    Syrup 100 milliGRAM(s) Oral every 6 hours PRN  melatonin 5 milliGRAM(s) Oral once PRN  morphine  - Injectable 1 milliGRAM(s) IV Push every 4 hours PRN  oxyCODONE    IR 5 milliGRAM(s) Oral three times a day PRN    VITALS:   T(F): 97.4  HR: 69  BP: 139/64  RR: 17  SpO2: 99%    LABS:                        6.6    2.62  )-----------( 128      ( 04 Jun 2018 06:35 )             19.2     06-04    137  |  102  |  21<H>  ----------------------------<  159<H>  4.1   |  23  |  0.6<L>    Ca    8.1<L>      04 Jun 2018 06:35  Mg     2.0     06-04    TPro  6.3  /  Alb  2.8<L>  /  TBili  2.3<H>  /  DBili  1.0<H>  /  AST  35  /  ALT  49<H>  /  AlkPhos  244<H>  06-04        RADIOLOGY:  < from: CT Abdomen and Pelvis w/ Oral Cont and w/ IV Cont (05.24.18 @ 12:26) >  IMPRESSION:   1.  Since January 27, 2018, new splenomegaly, multiple ill-defined   hypodense renal lesions and scattered borderline prominent abdominal   lymph nodes. Constellation of findings are suspicious for lymphoma;   metastatic disease is also a possibility. Tissue sampling is recommended   for definitive diagnosis.     2.  Unchanged incompletely characterized 0.8 cm hypodense pancreatic   lesion. This can be further evaluated with a pancreas protocol MRI.    < end of copied text >    PHYSICAL EXAM:  GEN: No acute distress  LUNGS: Clear to auscultation bilaterally   HEART: S1/S2 present. RRR.   ABD: Soft, non-tender, non-distended. Bowel sounds present  EXT: no edema, ambulating   NEURO: AAOX3 SUBJECTIVE:    Patient is a 67y old Female who presents with a chief complaint of cough, dyspnea on exertion, generalized weakness (21 May 2018 16:39)    Currently admitted to medicine with the primary diagnosis of Anemia     Today is hospital day 14d. This morning she is resting comfortably in bed and reports no new issues or overnight events.     PAST MEDICAL & SURGICAL HISTORY  Gastrointestinal bleeding, lower: In january 2018  Breast CA  History of total left hip replacement  Status post right breast lumpectomy    SOCIAL HISTORY:  Negative for smoking/alcohol/drug use.     ALLERGIES:  No Known Allergies    MEDICATIONS:  STANDING MEDICATIONS  ALBUTerol    0.083% 2.5 milliGRAM(s) Nebulizer every 6 hours  ampicillin/sulbactam  IVPB 3 Gram(s) IV Intermittent every 6 hours  anastrozole 1 milliGRAM(s) Oral daily  buDESOnide 160 MICROgram(s)/formoterol 4.5 MICROgram(s) Inhaler 2 Puff(s) Inhalation two times a day  nicotine -  14 mG/24Hr(s) Patch 1 patch Transdermal daily  nystatin Powder 1 Application(s) Topical two times a day  pantoprazole    Tablet 40 milliGRAM(s) Oral two times a day  predniSONE   Tablet 60 milliGRAM(s) Oral daily    PRN MEDICATIONS  acetaminophen   Tablet 650 milliGRAM(s) Oral every 6 hours PRN  guaiFENesin    Syrup 100 milliGRAM(s) Oral every 6 hours PRN  melatonin 5 milliGRAM(s) Oral once PRN  morphine  - Injectable 1 milliGRAM(s) IV Push every 4 hours PRN  oxyCODONE    IR 5 milliGRAM(s) Oral three times a day PRN    VITALS:   T(F): 97.4  HR: 69  BP: 139/64  RR: 17  SpO2: 99%    LABS:                        6.6    2.62  )-----------( 128      ( 04 Jun 2018 06:35 )             19.2     06-04    137  |  102  |  21<H>  ----------------------------<  159<H>  4.1   |  23  |  0.6<L>    Ca    8.1<L>      04 Jun 2018 06:35  Mg     2.0     06-04    TPro  6.3  /  Alb  2.8<L>  /  TBili  2.3<H>  /  DBili  1.0<H>  /  AST  35  /  ALT  49<H>  /  AlkPhos  244<H>  06-04      RADIOLOGY:  < from: CT Abdomen and Pelvis w/ Oral Cont and w/ IV Cont (05.24.18 @ 12:26) >  IMPRESSION:   1.  Since January 27, 2018, new splenomegaly, multiple ill-defined   hypodense renal lesions and scattered borderline prominent abdominal   lymph nodes. Constellation of findings are suspicious for lymphoma;   metastatic disease is also a possibility. Tissue sampling is recommended   for definitive diagnosis.     2.  Unchanged incompletely characterized 0.8 cm hypodense pancreatic   lesion. This can be further evaluated with a pancreas protocol MRI.    < end of copied text >    PHYSICAL EXAM:  GEN: No acute distress  LUNGS: Clear to auscultation bilaterally   HEART: S1/S2 present. RRR.   ABD: Soft, non-tender, non-distended. Bowel sounds present  EXT: no edema, ambulating   NEURO: AAOX3

## 2018-06-05 LAB
ALBUMIN SERPL ELPH-MCNC: 3.2 G/DL — LOW (ref 3.5–5.2)
ALP SERPL-CCNC: 253 U/L — HIGH (ref 30–115)
ALT FLD-CCNC: 55 U/L — HIGH (ref 0–41)
ANION GAP SERPL CALC-SCNC: 18 MMOL/L — HIGH (ref 7–14)
AST SERPL-CCNC: 37 U/L — SIGNIFICANT CHANGE UP (ref 0–41)
BILIRUB DIRECT SERPL-MCNC: 1 MG/DL — HIGH (ref 0–0.2)
BILIRUB INDIRECT FLD-MCNC: 1.4 MG/DL — HIGH (ref 0.2–1.2)
BILIRUB SERPL-MCNC: 2.4 MG/DL — HIGH (ref 0.2–1.2)
BUN SERPL-MCNC: 21 MG/DL — HIGH (ref 10–20)
CALCIUM SERPL-MCNC: 8.5 MG/DL — SIGNIFICANT CHANGE UP (ref 8.5–10.1)
CHLORIDE SERPL-SCNC: 96 MMOL/L — LOW (ref 98–110)
CO2 SERPL-SCNC: 22 MMOL/L — SIGNIFICANT CHANGE UP (ref 17–32)
CREAT SERPL-MCNC: 0.6 MG/DL — LOW (ref 0.7–1.5)
GLUCOSE SERPL-MCNC: 330 MG/DL — HIGH (ref 70–99)
HAPTOGLOB SERPL-MCNC: <20 MG/DL — LOW (ref 34–200)
HCT VFR BLD CALC: 26.9 % — LOW (ref 37–47)
HGB BLD-MCNC: 9.3 G/DL — LOW (ref 12–16)
HOMOCYSTEINE LEVEL: 7.9 UMOL/L — SIGNIFICANT CHANGE UP
LDH SERPL L TO P-CCNC: 598 — HIGH (ref 50–242)
MAGNESIUM SERPL-MCNC: 2 MG/DL — SIGNIFICANT CHANGE UP (ref 1.8–2.4)
MCHC RBC-ENTMCNC: 30.5 PG — SIGNIFICANT CHANGE UP (ref 27–31)
MCHC RBC-ENTMCNC: 34.6 G/DL — SIGNIFICANT CHANGE UP (ref 32–37)
MCV RBC AUTO: 88.2 FL — SIGNIFICANT CHANGE UP (ref 81–99)
METHYLMALONIC ACID LEVEL: 339 NMOL/L — HIGH (ref 87–318)
NRBC # BLD: 6 /100 WBCS — HIGH (ref 0–0)
PLATELET # BLD AUTO: 182 K/UL — SIGNIFICANT CHANGE UP (ref 130–400)
POTASSIUM SERPL-MCNC: 4.6 MMOL/L — SIGNIFICANT CHANGE UP (ref 3.5–5)
POTASSIUM SERPL-SCNC: 4.6 MMOL/L — SIGNIFICANT CHANGE UP (ref 3.5–5)
PROT SERPL-MCNC: 7.2 G/DL — SIGNIFICANT CHANGE UP (ref 6–8)
RBC # BLD: 3.05 M/UL — LOW (ref 4.2–5.4)
RBC # BLD: 3.05 M/UL — LOW (ref 4.2–5.4)
RBC # FLD: 17.6 % — HIGH (ref 11.5–14.5)
RETICS #: 72.6 K/UL — SIGNIFICANT CHANGE UP (ref 25–125)
RETICS/RBC NFR: 2.4 % — HIGH (ref 0.5–1.5)
SODIUM SERPL-SCNC: 136 MMOL/L — SIGNIFICANT CHANGE UP (ref 135–146)
TM INTERPRETATION: SIGNIFICANT CHANGE UP
WBC # BLD: 3.18 K/UL — LOW (ref 4.8–10.8)
WBC # FLD AUTO: 3.18 K/UL — LOW (ref 4.8–10.8)

## 2018-06-05 RX ADMIN — BUDESONIDE AND FORMOTEROL FUMARATE DIHYDRATE 2 PUFF(S): 160; 4.5 AEROSOL RESPIRATORY (INHALATION) at 22:01

## 2018-06-05 RX ADMIN — PANTOPRAZOLE SODIUM 40 MILLIGRAM(S): 20 TABLET, DELAYED RELEASE ORAL at 17:32

## 2018-06-05 RX ADMIN — OXYCODONE HYDROCHLORIDE 5 MILLIGRAM(S): 5 TABLET ORAL at 15:05

## 2018-06-05 RX ADMIN — Medication 5 MILLIGRAM(S): at 22:03

## 2018-06-05 RX ADMIN — ALBUTEROL 2.5 MILLIGRAM(S): 90 AEROSOL, METERED ORAL at 20:19

## 2018-06-05 RX ADMIN — AMPICILLIN SODIUM AND SULBACTAM SODIUM 200 GRAM(S): 250; 125 INJECTION, POWDER, FOR SUSPENSION INTRAMUSCULAR; INTRAVENOUS at 05:38

## 2018-06-05 RX ADMIN — NYSTATIN CREAM 1 APPLICATION(S): 100000 CREAM TOPICAL at 17:30

## 2018-06-05 RX ADMIN — ANASTROZOLE 1 MILLIGRAM(S): 1 TABLET ORAL at 11:10

## 2018-06-05 RX ADMIN — AMPICILLIN SODIUM AND SULBACTAM SODIUM 200 GRAM(S): 250; 125 INJECTION, POWDER, FOR SUSPENSION INTRAMUSCULAR; INTRAVENOUS at 23:06

## 2018-06-05 RX ADMIN — PREGABALIN 1000 MICROGRAM(S): 225 CAPSULE ORAL at 11:10

## 2018-06-05 RX ADMIN — OXYCODONE HYDROCHLORIDE 5 MILLIGRAM(S): 5 TABLET ORAL at 22:02

## 2018-06-05 RX ADMIN — AMPICILLIN SODIUM AND SULBACTAM SODIUM 200 GRAM(S): 250; 125 INJECTION, POWDER, FOR SUSPENSION INTRAMUSCULAR; INTRAVENOUS at 17:32

## 2018-06-05 RX ADMIN — Medication 60 MILLIGRAM(S): at 05:39

## 2018-06-05 RX ADMIN — PANTOPRAZOLE SODIUM 40 MILLIGRAM(S): 20 TABLET, DELAYED RELEASE ORAL at 05:39

## 2018-06-05 RX ADMIN — NYSTATIN CREAM 1 APPLICATION(S): 100000 CREAM TOPICAL at 05:39

## 2018-06-05 RX ADMIN — AMPICILLIN SODIUM AND SULBACTAM SODIUM 200 GRAM(S): 250; 125 INJECTION, POWDER, FOR SUSPENSION INTRAMUSCULAR; INTRAVENOUS at 11:10

## 2018-06-05 NOTE — PROGRESS NOTE ADULT - SUBJECTIVE AND OBJECTIVE BOX
SUBJECTIVE:    Patient is a 67y old Female who presents with a chief complaint of cough, dyspnea on exertion, generalized weakness (21 May 2018 16:39)    Currently admitted to medicine with the primary diagnosis of Anemia     Today is hospital day 15d.     PAST MEDICAL & SURGICAL HISTORY  Gastrointestinal bleeding, lower: In january 2018  Breast CA  History of total left hip replacement  Status post right breast lumpectomy    SOCIAL HISTORY:  Negative for smoking/alcohol/drug use.     ALLERGIES:  No Known Allergies    MEDICATIONS:  STANDING MEDICATIONS  ALBUTerol    0.083% 2.5 milliGRAM(s) Nebulizer every 6 hours  ampicillin/sulbactam  IVPB 3 Gram(s) IV Intermittent every 6 hours  anastrozole 1 milliGRAM(s) Oral daily  buDESOnide 160 MICROgram(s)/formoterol 4.5 MICROgram(s) Inhaler 2 Puff(s) Inhalation two times a day  cyanocobalamin Injectable 1000 MICROGram(s) IntraMuscular daily  nicotine -  14 mG/24Hr(s) Patch 1 patch Transdermal daily  nystatin Powder 1 Application(s) Topical two times a day  pantoprazole    Tablet 40 milliGRAM(s) Oral two times a day  predniSONE   Tablet 60 milliGRAM(s) Oral daily    PRN MEDICATIONS  acetaminophen   Tablet 650 milliGRAM(s) Oral every 6 hours PRN  guaiFENesin    Syrup 100 milliGRAM(s) Oral every 6 hours PRN  melatonin 5 milliGRAM(s) Oral once PRN  morphine  - Injectable 1 milliGRAM(s) IV Push every 4 hours PRN  oxyCODONE    IR 5 milliGRAM(s) Oral three times a day PRN    VITALS:   T(F): 97.9  HR: 68  BP: 157/68  RR: 18  SpO2: --    LABS:                        6.6    2.62  )-----------( 128      ( 04 Jun 2018 06:35 )             19.2     06-04    137  |  102  |  21<H>  ----------------------------<  159<H>  4.1   |  23  |  0.6<L>    Ca    8.1<L>      04 Jun 2018 06:35  Mg     2.0     06-04    TPro  6.3  /  Alb  2.8<L>  /  TBili  2.3<H>  /  DBili  1.0<H>  /  AST  35  /  ALT  49<H>  /  AlkPhos  244<H>  06-04                  RADIOLOGY:    PHYSICAL EXAM:  GEN:   LUNGS:    HEART:   ABD:   EXT:  NEURO: SUBJECTIVE:    Patient is a 67y old Female who presents with a chief complaint of cough, dyspnea on exertion, generalized weakness (21 May 2018 16:39)    Currently admitted to medicine with the primary diagnosis of Anemia     Today is hospital day 15d. No events overnight, ready to go home.     PAST MEDICAL & SURGICAL HISTORY  Gastrointestinal bleeding, lower: In january 2018  Breast CA  History of total left hip replacement  Status post right breast lumpectomy    SOCIAL HISTORY:  Negative for smoking/alcohol/drug use.     ALLERGIES:  No Known Allergies    MEDICATIONS:  STANDING MEDICATIONS  ALBUTerol    0.083% 2.5 milliGRAM(s) Nebulizer every 6 hours  ampicillin/sulbactam  IVPB 3 Gram(s) IV Intermittent every 6 hours  anastrozole 1 milliGRAM(s) Oral daily  buDESOnide 160 MICROgram(s)/formoterol 4.5 MICROgram(s) Inhaler 2 Puff(s) Inhalation two times a day  cyanocobalamin Injectable 1000 MICROGram(s) IntraMuscular daily  nicotine -  14 mG/24Hr(s) Patch 1 patch Transdermal daily  nystatin Powder 1 Application(s) Topical two times a day  pantoprazole    Tablet 40 milliGRAM(s) Oral two times a day  predniSONE   Tablet 60 milliGRAM(s) Oral daily    PRN MEDICATIONS  acetaminophen   Tablet 650 milliGRAM(s) Oral every 6 hours PRN  guaiFENesin    Syrup 100 milliGRAM(s) Oral every 6 hours PRN  melatonin 5 milliGRAM(s) Oral once PRN  morphine  - Injectable 1 milliGRAM(s) IV Push every 4 hours PRN  oxyCODONE    IR 5 milliGRAM(s) Oral three times a day PRN    VITALS:   T(F): 97.9  HR: 68  BP: 157/68  RR: 18  SpO2: --    LABS:                        6.6    2.62  )-----------( 128      ( 04 Jun 2018 06:35 )             19.2     06-04    137  |  102  |  21<H>  ----------------------------<  159<H>  4.1   |  23  |  0.6<L>    Ca    8.1<L>      04 Jun 2018 06:35  Mg     2.0     06-04    TPro  6.3  /  Alb  2.8<L>  /  TBili  2.3<H>  /  DBili  1.0<H>  /  AST  35  /  ALT  49<H>  /  AlkPhos  244<H>  06-04      RADIOLOGY:  < from: MR Abdomen No Cont (06.04.18 @ 18:43) >  Examination terminated early due to patient condition; no DWI, axial T2   fat sat or contrast images obtained, limiting evaluation.    1. 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 Follow-up MRCP in one year recommended.    2. Renal lesions, abdominal enlarged lymph nodes better evaluated on   postcontrast CT May 24, 2018. Splenomegaly, 15.7 cm CC.    < end of copied text >    PHYSICAL EXAM:  GEN: No acute distress  LUNGS: b/l breath sounds equal, no wheeze   HEART: RRR  ABD: non tender to palpation   EXT: no edema, moves all extremities,   NEURO: AOX3, wants to go home SUBJECTIVE:    Patient is a 67y old Female who presents with a chief complaint of cough, dyspnea on exertion, generalized weakness (21 May 2018 16:39)    Currently admitted to medicine with the primary diagnosis of Anemia     Today is hospital day 15d. No events overnight, ready to go home.     PAST MEDICAL & SURGICAL HISTORY  Gastrointestinal bleeding, lower: In january 2018  Breast CA  History of total left hip replacement  Status post right breast lumpectomy    SOCIAL HISTORY:  Negative for smoking/alcohol/drug use.     ALLERGIES:  No Known Allergies    MEDICATIONS:  STANDING MEDICATIONS  ALBUTerol    0.083% 2.5 milliGRAM(s) Nebulizer every 6 hours  ampicillin/sulbactam  IVPB 3 Gram(s) IV Intermittent every 6 hours  anastrozole 1 milliGRAM(s) Oral daily  buDESOnide 160 MICROgram(s)/formoterol 4.5 MICROgram(s) Inhaler 2 Puff(s) Inhalation two times a day  cyanocobalamin Injectable 1000 MICROGram(s) IntraMuscular daily  nicotine -  14 mG/24Hr(s) Patch 1 patch Transdermal daily  nystatin Powder 1 Application(s) Topical two times a day  pantoprazole    Tablet 40 milliGRAM(s) Oral two times a day  predniSONE   Tablet 60 milliGRAM(s) Oral daily    PRN MEDICATIONS  acetaminophen   Tablet 650 milliGRAM(s) Oral every 6 hours PRN  guaiFENesin    Syrup 100 milliGRAM(s) Oral every 6 hours PRN  melatonin 5 milliGRAM(s) Oral once PRN  morphine  - Injectable 1 milliGRAM(s) IV Push every 4 hours PRN  oxyCODONE    IR 5 milliGRAM(s) Oral three times a day PRN    VITALS:   T(F): 97.9  HR: 68  BP: 157/68  RR: 18    LABS:                          9.3    3.18  )-----------( 182      ( 05 Jun 2018 09:41 )             26.9             06-05    136  |  96<L>  |  21<H>  ----------------------------<  330<H>  4.6   |  22  |  0.6<L>    Ca    8.5      05 Jun 2018 09:41  Mg     2.0     06-05    TPro  7.2  /  Alb  3.2<L>  /  TBili  2.4<H>  /  DBili  1.0<H>  /  AST  37  /  ALT  55<H>  /  AlkPhos  253<H>  06-05                    6.6    2.62  )-----------( 128      ( 04 Jun 2018 06:35 )             19.2     Mg     2.0     06-04    TPro  6.3  /  Alb  2.8<L>  /  TBili  2.3<H>  /  DBili  1.0<H>  /  AST  35  /  ALT  49<H>  /  AlkPhos  244<H>  06-04    RADIOLOGY:  < from: MR Abdomen No Cont (06.04.18 @ 18:43) >  Examination terminated early due to patient condition; no DWI, axial T2   fat sat or contrast images obtained, limiting evaluation.    1. 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 Follow-up MRCP in one year recommended.    2. Renal lesions, abdominal enlarged lymph nodes better evaluated on   postcontrast CT May 24, 2018. Splenomegaly, 15.7 cm CC.    < end of copied text >    PHYSICAL EXAM:  GEN: No acute distress  LUNGS: b/l breath sounds equal, no wheeze   HEART: RRR  ABD: non tender to palpation   EXT: no edema, moves all extremities,   NEURO: AOX3, wants to go home

## 2018-06-05 NOTE — PROGRESS NOTE ADULT - ASSESSMENT
Lymphadenopathy with pancytopenia: ?Lymphoma. Lymphadenopathy especially in axilla could also be explained by possible breast Ca recurrence.   --S/P axillary LN biopsy and bone marrow biopsy  --Awaiting pathology.      Macrocytic Anemia: Has had acute drop again. Denies having bleeding.   --Multifactorial (GI bleeding + ?anemia of chronic disease + mild AIHA)  -- B12 and folate WNL. MMA elevated. B12 1000mcg daily x 7days followed by once a week followed by once a month.  --Hgb improving.   -- Continue prednisone 60 mg  for AIHA. Transfuse as needed.     HyperBilirubinemia:   --Predominantly direct bilirubinemia  --sec to hemolysis vs ?ductal obstruction from pancreatic head mass.   --MRCP report revealed IPMN.       DVT/GI ppx

## 2018-06-05 NOTE — PROGRESS NOTE ADULT - ASSESSMENT
Assessment and Plan:  		  68 yo female patient with PMHx of right breast cancer (Well to moderately differentiated invasive ductal carcinoma ER+, RI+, Her-2 neg, stage T1c, N0, M0) s/p lumpectomy around 1.5 years ago, followed by radiation therapy and currently maintained on anastrazole, history of life-threatening lower GI bleeding 2/2 hemorrhoids s/p hemorrhoidectomy and fissurotomy by Dr Ocampo s/p reinforcement of suture lines in 1/2018 presenting with generalized weakness, dyspnea on exertion and dark stools s/p NSAIDs intake.    #Acute on chronic anemia   no evidence of GI bleeding   EGD 5/23 showing erosive gastritis and esophagitis   likely 2/2 hematologic disorder   c/w cbc q12h   PPI q12h   transfuse as needed  Hematology follow r/o BM suppression  r/o hemolysis, BM bx result pending.   L axillary LN biopsy result pending (hx of breast cancer s/p lumpectomy)  MRI/MRCP Abd w/o contrast showing multiple cystic lesions up to 0.8 cm in pancreatic head and up to 1 cm in pancreatic body.    #Worsening LFTs   cholestatic pattern   repeat LFTs showing decreased levels r/o lab error r/o passed stones   r/o mass obstruction of the cbd   r/o infiltration by malignant cells   checked MRI/MRCP showing punctate right hepatic lobe cyst.   daily LFTs  6/4 LFT is improving with a decrease in direct bilirubin, AST, and ALT.      #Pancytopenia   likely multifactorial   anastrozole likely a contributing factor   new suspicion of lymphoma on CT scan,   f/u bone marrow and LN boipsies, both results pending.  Hemonc f/u Assessment and Plan:  		  66 yo female patient with PMHx of right breast cancer (Well to moderately differentiated invasive ductal carcinoma ER+, AL+, Her-2 neg, stage T1c, N0, M0) s/p lumpectomy around 1.5 years ago, followed by radiation therapy and currently maintained on anastrazole, history of life-threatening lower GI bleeding 2/2 hemorrhoids s/p hemorrhoidectomy and fissurotomy by Dr Ocampo s/p reinforcement of suture lines in 1/2018 presenting with generalized weakness, dyspnea on exertion and dark stools s/p NSAIDs intake.    #Acute on chronic anemia   no evidence of GI bleeding   EGD 5/23 showing erosive gastritis and esophagitis   likely 2/2 hematologic disorder   c/w cbc q12h   PPI q12h   transfuse as needed  Hematology follow r/o BM suppression  r/o hemolysis, BM bx result pending.   L axillary LN biopsy result pending (hx of breast cancer s/p lumpectomy)  MRI/MRCP Abd w/o contrast showing multiple cystic lesions is most likely benign. May consider f/u for a repeat MRCP or CT Pancreas Protocol after one year as outpatient.   Unless LN bx and BM bx are negative, no further endoscopy procedure is indicated.     #Worsening LFTs   cholestatic pattern   repeat LFTs showing decreased levels r/o lab error r/o passed stones   r/o mass obstruction of the cbd   r/o infiltration by malignant cells   checked MRI/MRCP showing punctate right hepatic lobe cyst.   daily LFTs  6/4 LFT is improving with a decrease in direct bilirubin, AST, and ALT.      #Pancytopenia   likely multifactorial   anastrozole likely a contributing factor   new suspicion of lymphoma on CT scan,   f/u bone marrow and LN boipsies, both results pending.  Hemonc f/u Assessment and Plan:  		  66 yo female patient with PMHx of right breast cancer (Well to moderately differentiated invasive ductal carcinoma ER+, MS+, Her-2 neg, stage T1c, N0, M0) s/p lumpectomy around 1.5 years ago, followed by radiation therapy and currently maintained on anastrazole, history of life-threatening lower GI bleeding 2/2 hemorrhoids s/p hemorrhoidectomy and fissurotomy by Dr Ocampo s/p reinforcement of suture lines in 1/2018 presenting with generalized weakness, dyspnea on exertion and dark stools s/p NSAIDs intake.    #Acute on chronic anemia   no evidence of GI bleeding (extensive GI workup)  likely 2/2 hematologic disorder   c/w cbc q12h   PPI q12h   transfuse as needed  Hematology follow r/o BM suppression  r/o hemolysis, BM bx result pending.   L axillary LN biopsy result pending (hx of breast cancer s/p lumpectomy)  MRI/MRCP Abd w/o contrast showing multiple cystic lesions of pancreas is most likely benign. May consider f/u for a repeat MRCP or CT Pancreas Protocol after one year as outpatient.   IfLN bx and BM bx are negative, capsule endoscopy might be indicated to identify cause for anemia    #Elevated LFTs   cholestatic pattern   -check daily LFTs  - avoid hepatotoxic meds

## 2018-06-05 NOTE — PROGRESS NOTE ADULT - ASSESSMENT
· Assessment		    #) hemolytic anemia with pancytopenia  -last hemoglobin is 6.6, transfused 1 unit yesterday-- awaiting AM   -pt has elevated LDH, high retic count, positive warm antibodies,  -has history of lower GI bleeding few months ago, and was found to have positive guaiac in ED, no active bleed now, s/p 3 transfusion on admission  -transfused 2 units pRBC on 05/29/18  -Sx following (Dr. Guzman)- s/p L axillary LN bx at [facility] on 05/30/18  -ID following (Dr. John)- IV Unasyn 3g q6h   -transfused 2 units pRBC on 05/31/18  -capsule endscopy possibly as OP  -Heme/Onc f/u (Dr. Miller)- prednisone 60 mg today for AIHA. Transfuse as needed. Awaiting biopsy results, b12 1000mg daily for 7 days, and then once a week   -IR (Dr. Hernandez)- s/p bone marrow bx (06/01/18)  -MRCP completed, awaiting read    #) Cough and exertional shortness of breath  -resolved SOB, cough improved  -Patient might have non diagnosed COPD (chronic smoking 50 pack year, stopped only 4 months ago)  -possible acute bronchitis / viral upper resp tract infection    #) History of buttock skin lesion s/p excision during hemorrhoidectomy surgery  -Pathology showing malignancy of undetermined origin, clean margins of excision  -Needs to follow up with oncology and address this issue too    #) Breast cancer s/p lumpectomy and currently on anastrozole  -c/w anastrazole    #) DVT/ GI ppx  -encourage ambulation, sequentials/ protonix    #) Code Status  -full code    #) Dispo  -from home · Assessment		    #) hemolytic anemia with pancytopenia  -given 1 unit yesterday, today Hgb 9.3. Will monitor, f/u AM CBC   -pt has elevated LDH, high retic count, positive warm antibodies,  -has history of lower GI bleeding few months ago, and was found to have positive guaiac in ED, no active bleed now, s/p 3 transfusion on admission  -transfused 2 units pRBC on 05/29/18  -Sx following (Dr. Guzman)- s/p L axillary LN bx at [facility] on 05/30/18  -ID following (Dr. John)- IV Unasyn 3g q6h. Awaiting bcx   -transfused 2 units pRBC on 05/31/18  -capsule endscopy possibly as OP  -Heme/Onc f/u (Dr. Miller)- prednisone 60 mg today for AIHA. Transfuse as needed. Awaiting biopsy results, b12 1000mg daily for 7 days, and then once a week   -IR (Dr. Hernandez)- s/p bone marrow bx (06/01/18)  -MRCP completed-- AWAITING GI follow up     #) Cough and exertional shortness of breath  -resolved SOB, cough improved  -Patient might have non diagnosed COPD (chronic smoking 50 pack year, stopped only 4 months ago)  -possible acute bronchitis / viral upper resp tract infection    #) History of buttock skin lesion s/p excision during hemorrhoidectomy surgery  -Pathology showing malignancy of undetermined origin, clean margins of excision  -Needs to follow up with oncology and address this issue too    #) Breast cancer s/p lumpectomy and currently on anastrozole  -c/w anastrazole    #) DVT/ GI ppx  -encourage ambulation, sequentials/ protonix    #) Code Status  -full code    #) Dispo  -from home · Assessment		    #) hemolytic anemia with pancytopenia  -given 1 unit yesterday, today Hgb 9.3. Will monitor, f/u AM CBC   -pt has elevated LDH, high retic count, positive warm antibodies,  -has history of lower GI bleeding few months ago, and was found to have positive guaiac in ED, no active bleed now, s/p 3 transfusion on admission  -transfused 2 units pRBC on 05/29/18  -Sx following (Dr. Guzman)- s/p L axillary LN bx at [facility] on 05/30/18  -ID following (Dr. John)- IV Unasyn 3g q6h. Awaiting bcx   -transfused 2 units pRBC on 05/31/18  -capsule endscopy possibly as OP  -Heme/Onc f/u (Dr. Miller)- prednisone 60 mg today for AIHA. Transfuse as needed. Awaiting biopsy results, b12 1000mg daily for 7 days, and then once a week   -IR (Dr. Hernandez)- s/p bone marrow bx (06/01/18)  -MRCP completed-- AWAITING GI follow up     #) Cough and exertional shortness of breath  -resolved SOB, cough improved  -Patient might have non diagnosed COPD (chronic smoking 50 pack year, stopped only 4 months ago)  -possible acute bronchitis / viral upper resp tract infection    #) History of buttock skin lesion s/p excision during hemorrhoidectomy surgery  -Pathology showing malignancy of undetermined origin, clean margins of excision  -Needs to follow up with oncology and address this issue too    #) Breast cancer s/p lumpectomy and currently on anastrozole  -c/w anastrazole    #) DVT/ GI ppx  -encourage ambulation, sequentials/ protonix    #) Code Status  -full code    #) Dispo  -from home     Attending Attestation:    Pt seen and examined at bedside. Case and Plan discussed with Team and Patient. Pt doing much better today. HG = 9.3 from 6.6 post 1PRBC transfusion. Pt is on steroids for Hemolytic Anemia - Warm Antibodies Positive. Stop IVF if Blood cxs come back Negative. Monitor daily CBC and Hematology to determine dispo time to home. Will need f/u with Heme / PMD / BM and LN Bxs if reports not back this hospitalization.     Dispo: Will be HOME upon Heme/Onc clearance.

## 2018-06-05 NOTE — PROGRESS NOTE ADULT - SUBJECTIVE AND OBJECTIVE BOX
INTERVAL HPI/OVERNIGHT EVENTS:  66 yo female patient with PMHx of right breast cancer (Well to moderately differentiated invasive ductal carcinoma ER+, VT+, Her-2 neg, stage T1c, N0, M0) s/p lumpectomy around 1.5 years ago, followed by radiation therapy and currently maintained on anastrazole, history of lower GI bleeding back in January 2018 which was attributed to hemorrhoids s/p hemorrhoidectomy and fissurotomy, currently presented because of worsening generalized weakness and dyspnea on exertion. Upon today's interview, patient denies dyspnea, malaise, or pain and she feels well. Patient reports that she has had a BM this morning of normal color and no blood. Patient reports that she ended her MRCP early yesterday due to feeling claustrophobic.     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 Patient was d/c uneventfully then presented again after few weeks for BRBPR and significant drop in hb and Colonoscopy 1/2018 for BRBPR by Dr sanchez: poor prep, internal hemorrhoids, skin lesion noted at the buttock, diverticulosis. No blood was seen in colon or TI in all colonoscopies so bleeding was attributed to hemorrhoids s/p hemorrhoidectomy on 1/11/18 by Dr Ocampo with excision of 2 perianal lesions and fissurotomy. Around 2 weeks after, the patient presented again with significant drop in hb and BRBPR and Dr ocampo perfomed a reinforcement of hemorrhoidectomy suture line and rigid sigmoidoscopy.      Colonoscopy 1/2018 for BRBPR by Dr sanchez: poor prep, internal hemorrhoids, skin lesion noted at the buttock, diverticulosis.  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 In ED, patient was found to have severe anemia (Hg 5.8) and positive guaiac, admitted for evaluation. Baseline hb 12-13 in 2017.     FH non significant   usually has 1 normal bm per day, no weight loss   5/23 EGD showed no obvious source of bleeding but showed erosive gastritis and esophagitis   5/24 patient could not receive the capsule endoscopy since she had oral contrast for CT scan being done for investigation for malignancy (hx of breat ca, new LN detected on recent CT). No bms overnight, no other complaints.   5/25 no bms, ct scan suspicious for lymphoma. Since this can explain her lymphoma the capsule endoscopy was cancelled  6/1 GI called again for worsening LFTs and recurrent drop in hb. Note that she received an axillary LN bx to r/o recurrent breast ca, bone marrow biopsy for pancytopenia. Ct abd showing new splenomegaly, x renal lesions, 8 mm pancreatic head lesion   6/2 clinically stable no complaints, pending mri / mrcp, no evidence of bleeding, had 1 normal bm overnight, received 1 u PRBC overnight   6/4 no new complaints. No evidence of bleeding. had 1 bm morning that was "yellow", received 1 u PRNC overnight  6/5 feeling well. 1 BM in the morning, no blood. Appetite and eating normal.     REVIEW OF SYSTEMS:  Constitutional: No fever, weight loss or fatigue  Cardiovascular: No chest pain, palpitations, dizziness or leg swelling  Gastrointestinal: No abdominal or epigastric pain. No nausea, vomiting or hematemesis; No diarrhea or constipation. No melena or hematochezia.  Skin: No itching, burning, rashes or lesions     PAST MEDICAL & SURGICAL HISTORY:  Gastrointestinal bleeding, lower: In january 2018  Breast CA  History of total left hip replacement  Status post right breast lumpectomy    Allergies  No Known Allergies    MEDICATIONS:  MEDICATIONS  (STANDING):  ALBUTerol    0.083% 2.5 milliGRAM(s) Nebulizer every 6 hours  ampicillin/sulbactam  IVPB 3 Gram(s) IV Intermittent every 6 hours  anastrozole 1 milliGRAM(s) Oral daily  buDESOnide 160 MICROgram(s)/formoterol 4.5 MICROgram(s) Inhaler 2 Puff(s) Inhalation two times a day  cyanocobalamin Injectable 1000 MICROGram(s) IntraMuscular daily  nicotine -  14 mG/24Hr(s) Patch 1 patch Transdermal daily  nystatin Powder 1 Application(s) Topical two times a day  pantoprazole    Tablet 40 milliGRAM(s) Oral two times a day  predniSONE   Tablet 60 milliGRAM(s) Oral daily    MEDICATIONS  (PRN):  acetaminophen   Tablet 650 milliGRAM(s) Oral every 6 hours PRN For Temp greater than 38 C (100.4 F)  guaiFENesin    Syrup 100 milliGRAM(s) Oral every 6 hours PRN Cough  melatonin 5 milliGRAM(s) Oral once PRN Insomnia  morphine  - Injectable 1 milliGRAM(s) IV Push every 4 hours PRN Moderate Pain (4 - 6)  oxyCODONE    IR 5 milliGRAM(s) Oral three times a day PRN Moderate Pain (4 - 6)    Physical Exam:  Vital Signs Last 24 Hrs  T(C): 36.6 (05 Jun 2018 04:56), Max: 36.6 (05 Jun 2018 04:56)  T(F): 97.9 (05 Jun 2018 04:56), Max: 97.9 (05 Jun 2018 04:56)  HR: 68 (05 Jun 2018 04:56) (68 - 80)  BP: 157/68 (05 Jun 2018 04:56) (157/68 - 157/70)  BP(mean): --  RR: 18 (05 Jun 2018 04:56) (18 - 18)  SpO2: --    06-04 @ 07:01  -  06-05 @ 07:00  --------------------------------------------------------  IN: 311 mL / OUT: 0 mL / NET: 311 mL    General: Well developed; well nourished; in no acute distress  HEENT: MMM, conjunctiva and sclera clear  Gastrointestinal: Soft, non-tender, non-distended; Normal bowel sounds in all 4 quadrants; No hepatosplenomegaly. No rebound or guarding  Skin: Warm and dry. No obvious rash    LABS:  CBC Full  -  ( 05 Jun 2018 09:41 )  WBC Count : 3.18 K/uL  Hemoglobin : 9.3 g/dL  Hematocrit : 26.9 %  Platelet Count - Automated : 182 K/uL  Mean Cell Volume : 88.2 fL  Mean Cell Hemoglobin : 30.5 pg  Mean Cell Hemoglobin Concentration : 34.6 g/dL  Auto Neutrophil # : x  Auto Lymphocyte # : x  Auto Monocyte # : x  Auto Eosinophil # : x  Auto Basophil # : x  Auto Neutrophil % : x  Auto Lymphocyte % : x  Auto Monocyte % : x  Auto Eosinophil % : x  Auto Basophil % : x    06-05    136  |  96<L>  |  21<H>  ----------------------------<  330<H>  4.6   |  22  |  0.6<L>    Ca    8.5      05 Jun 2018 09:41  Mg     2.0     06-05    TPro  7.2  /  Alb  3.2<L>  /  TBili  2.4<H>  /  DBili  1.0<H>  /  AST  37  /  ALT  55<H>  /  AlkPhos  253<H>  06-05      RADIOLOGY & ADDITIONAL STUDIES:  < from: MR Abdomen No Cont (06.04.18 @ 18:43) >  Findings:    Examination terminated early due to patient condition; no DWI, axial T2   fat sat or contrast images obtained, limiting evaluation.    Liver: Noncirrhotic. No significant hepatic steatosis or iron deposition.   Punctate right hepatic lobe cyst.    Gallbladder: Normal gallbladder.    Biliary system: No evidence of biliary distention, filling defect, or   stricture.    Pancreas: Multiple pancreatic cystic lesions, measuring up to 0.8 cm at   the pancreatic head and 1 cm at the pancreatic body, in proximity to   nondilated main pancreatic duct (series 5, image 29 and 30).     Additional abdominal organs: Adrenal glands unremarkable. Renal lesions,   abdominal enlarged lymph nodes better evaluated on postcontrast CT May   24, 2018. Splenomegaly, 15.7 cm CC.    Additional findings: No ascites or lymphadenopathy. Normal bone marrow   signal.    Impression:  Examination terminated early due to patient condition; no DWI, axial T2   fat sat or contrast images obtained, limiting evaluation.    1. 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 Follow-up MRCP in one year recommended.    2. Renal lesions, abdominal enlarged lymph nodes better evaluated on   postcontrast CT May 24, 2018. Splenomegaly, 15.7 cm CC. INTERVAL HPI/OVERNIGHT EVENTS:  66 yo female patient with PMHx of right breast cancer (Well to moderately differentiated invasive ductal carcinoma ER+, MD+, Her-2 neg, stage T1c, N0, M0) s/p lumpectomy around 1.5 years ago, followed by radiation therapy and currently maintained on anastrazole, history of lower GI bleeding back in January 2018 which was attributed to hemorrhoids s/p hemorrhoidectomy and fissurotomy, currently presented because of worsening generalized weakness and dyspnea on exertion. Upon today's interview, patient denies dyspnea, malaise, or pain and she feels well. Patient reports that she has had a BM this morning of normal color and no blood. Patient reports that she ended her MRCP early yesterday due to feeling claustrophobic.     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 Patient was d/c uneventfully then presented again after few weeks for BRBPR and significant drop in hb and Colonoscopy 1/2018 for BRBPR by Dr sanchez: poor prep, internal hemorrhoids, skin lesion noted at the buttock, diverticulosis. No blood was seen in colon or TI in all colonoscopies so bleeding was attributed to hemorrhoids s/p hemorrhoidectomy on 1/11/18 by Dr Ocampo with excision of 2 perianal lesions and fissurotomy. Around 2 weeks after, the patient presented again with significant drop in hb and BRBPR and Dr ocampo perfomed a reinforcement of hemorrhoidectomy suture line and rigid sigmoidoscopy.      Colonoscopy 1/2018 for BRBPR by Dr sanchez: poor prep, internal hemorrhoids, skin lesion noted at the buttock, diverticulosis.  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 In ED, patient was found to have severe anemia (Hg 5.8) and positive guaiac, admitted for evaluation. Baseline hb 12-13 in 2017.     FH non significant   usually has 1 normal bm per day, no weight loss   5/23 EGD showed no obvious source of bleeding but showed erosive gastritis and esophagitis   5/24 patient could not receive the capsule endoscopy since she had oral contrast for CT scan being done for investigation for malignancy (hx of breat ca, new LN detected on recent CT). No bms overnight, no other complaints.   5/25 no bms, ct scan suspicious for lymphoma. Since this can explain her lymphoma the capsule endoscopy was cancelled  6/1 GI called again for worsening LFTs and recurrent drop in hb. Note that she received an axillary LN bx to r/o recurrent breast ca, bone marrow biopsy for pancytopenia. Ct abd showing new splenomegaly, x renal lesions, 8 mm pancreatic head lesion   6/2 clinically stable no complaints, pending mri / mrcp, no evidence of bleeding, had 1 normal bm overnight, received 1 u PRBC overnight   6/4 no new complaints. No evidence of bleeding. had 1 bm morning that was "yellow", received 1 u PRNC overnight  6/5 feeling well. 1 BM in the morning, no blood. Appetite and eating normally    REVIEW OF SYSTEMS:  Constitutional: No fever, weight loss or fatigue  Cardiovascular: No chest pain, palpitations, dizziness or leg swelling  Gastrointestinal: No abdominal or epigastric pain. No nausea, vomiting or hematemesis; No diarrhea or constipation. No melena or hematochezia.  Skin: No itching, burning, rashes or lesions     PAST MEDICAL & SURGICAL HISTORY:  Gastrointestinal bleeding, lower: In january 2018  Breast CA  History of total left hip replacement  Status post right breast lumpectomy    Allergies  No Known Allergies    MEDICATIONS:  MEDICATIONS  (STANDING):  ALBUTerol    0.083% 2.5 milliGRAM(s) Nebulizer every 6 hours  ampicillin/sulbactam  IVPB 3 Gram(s) IV Intermittent every 6 hours  anastrozole 1 milliGRAM(s) Oral daily  buDESOnide 160 MICROgram(s)/formoterol 4.5 MICROgram(s) Inhaler 2 Puff(s) Inhalation two times a day  cyanocobalamin Injectable 1000 MICROGram(s) IntraMuscular daily  nicotine -  14 mG/24Hr(s) Patch 1 patch Transdermal daily  nystatin Powder 1 Application(s) Topical two times a day  pantoprazole    Tablet 40 milliGRAM(s) Oral two times a day  predniSONE   Tablet 60 milliGRAM(s) Oral daily    MEDICATIONS  (PRN):  acetaminophen   Tablet 650 milliGRAM(s) Oral every 6 hours PRN For Temp greater than 38 C (100.4 F)  guaiFENesin    Syrup 100 milliGRAM(s) Oral every 6 hours PRN Cough  melatonin 5 milliGRAM(s) Oral once PRN Insomnia  morphine  - Injectable 1 milliGRAM(s) IV Push every 4 hours PRN Moderate Pain (4 - 6)  oxyCODONE    IR 5 milliGRAM(s) Oral three times a day PRN Moderate Pain (4 - 6)    Physical Exam:  Vital Signs Last 24 Hrs  T(C): 36.6 (05 Jun 2018 04:56), Max: 36.6 (05 Jun 2018 04:56)  T(F): 97.9 (05 Jun 2018 04:56), Max: 97.9 (05 Jun 2018 04:56)  HR: 68 (05 Jun 2018 04:56) (68 - 80)  BP: 157/68 (05 Jun 2018 04:56) (157/68 - 157/70)  BP(mean): --  RR: 18 (05 Jun 2018 04:56) (18 - 18)  SpO2: --    06-04 @ 07:01  -  06-05 @ 07:00  --------------------------------------------------------  IN: 311 mL / OUT: 0 mL / NET: 311 mL    General: Well developed; well nourished; in no acute distress  HEENT: MMM, conjunctiva and sclera clear  Gastrointestinal: Soft, non-tender, non-distended; Normal bowel sounds in all 4 quadrants; No hepatosplenomegaly. No rebound or guarding  Skin: Warm and dry. No obvious rash    LABS:  CBC Full  -  ( 05 Jun 2018 09:41 )  WBC Count : 3.18 K/uL  Hemoglobin : 9.3 g/dL  Hematocrit : 26.9 %  Platelet Count - Automated : 182 K/uL  Mean Cell Volume : 88.2 fL  Mean Cell Hemoglobin : 30.5 pg  Mean Cell Hemoglobin Concentration : 34.6 g/dL  Auto Neutrophil # : x  Auto Lymphocyte # : x  Auto Monocyte # : x  Auto Eosinophil # : x  Auto Basophil # : x  Auto Neutrophil % : x  Auto Lymphocyte % : x  Auto Monocyte % : x  Auto Eosinophil % : x  Auto Basophil % : x    06-05    136  |  96<L>  |  21<H>  ----------------------------<  330<H>  4.6   |  22  |  0.6<L>    Ca    8.5      05 Jun 2018 09:41  Mg     2.0     06-05    TPro  7.2  /  Alb  3.2<L>  /  TBili  2.4<H>  /  DBili  1.0<H>  /  AST  37  /  ALT  55<H>  /  AlkPhos  253<H>  06-05      RADIOLOGY & ADDITIONAL STUDIES:  < from: MR Abdomen No Cont (06.04.18 @ 18:43) >  Findings:    Examination terminated early due to patient condition; no DWI, axial T2   fat sat or contrast images obtained, limiting evaluation.    Liver: Noncirrhotic. No significant hepatic steatosis or iron deposition.   Punctate right hepatic lobe cyst.    Gallbladder: Normal gallbladder.    Biliary system: No evidence of biliary distention, filling defect, or   stricture.    Pancreas: Multiple pancreatic cystic lesions, measuring up to 0.8 cm at   the pancreatic head and 1 cm at the pancreatic body, in proximity to   nondilated main pancreatic duct (series 5, image 29 and 30).     Additional abdominal organs: Adrenal glands unremarkable. Renal lesions,   abdominal enlarged lymph nodes better evaluated on postcontrast CT May   24, 2018. Splenomegaly, 15.7 cm CC.    Additional findings: No ascites or lymphadenopathy. Normal bone marrow   signal.    Impression:  Examination terminated early due to patient condition; no DWI, axial T2   fat sat or contrast images obtained, limiting evaluation.    1. 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 Follow-up MRCP in one year recommended.    2. Renal lesions, abdominal enlarged lymph nodes better evaluated on   postcontrast CT May 24, 2018. Splenomegaly, 15.7 cm CC.

## 2018-06-06 ENCOUNTER — APPOINTMENT (OUTPATIENT)
Dept: BREAST CENTER | Facility: CLINIC | Age: 68
End: 2018-06-06

## 2018-06-06 VITALS
DIASTOLIC BLOOD PRESSURE: 64 MMHG | SYSTOLIC BLOOD PRESSURE: 112 MMHG | TEMPERATURE: 96 F | RESPIRATION RATE: 18 BRPM | HEART RATE: 69 BPM

## 2018-06-06 LAB
ALBUMIN SERPL ELPH-MCNC: 3 G/DL — LOW (ref 3.5–5.2)
ALP SERPL-CCNC: 203 U/L — HIGH (ref 30–115)
ALT FLD-CCNC: 46 U/L — HIGH (ref 0–41)
ANION GAP SERPL CALC-SCNC: 11 MMOL/L — SIGNIFICANT CHANGE UP (ref 7–14)
AST SERPL-CCNC: 28 U/L — SIGNIFICANT CHANGE UP (ref 0–41)
BILIRUB DIRECT SERPL-MCNC: 0.7 MG/DL — HIGH (ref 0–0.2)
BILIRUB INDIRECT FLD-MCNC: 1.2 MG/DL — SIGNIFICANT CHANGE UP (ref 0.2–1.2)
BILIRUB SERPL-MCNC: 1.9 MG/DL — HIGH (ref 0.2–1.2)
BUN SERPL-MCNC: 18 MG/DL — SIGNIFICANT CHANGE UP (ref 10–20)
CALCIUM SERPL-MCNC: 8.3 MG/DL — LOW (ref 8.5–10.1)
CHLORIDE SERPL-SCNC: 101 MMOL/L — SIGNIFICANT CHANGE UP (ref 98–110)
CO2 SERPL-SCNC: 27 MMOL/L — SIGNIFICANT CHANGE UP (ref 17–32)
CREAT SERPL-MCNC: 0.5 MG/DL — LOW (ref 0.7–1.5)
GLUCOSE SERPL-MCNC: 176 MG/DL — HIGH (ref 70–99)
HAPTOGLOB SERPL-MCNC: <20 MG/DL — LOW (ref 34–200)
HCT VFR BLD CALC: 21.4 % — LOW (ref 37–47)
HEMATOPATHOLOGY REPORT: SIGNIFICANT CHANGE UP
HGB BLD-MCNC: 7.4 G/DL — CRITICAL LOW (ref 12–16)
LDH SERPL L TO P-CCNC: 483 — HIGH (ref 50–242)
MAGNESIUM SERPL-MCNC: 1.9 MG/DL — SIGNIFICANT CHANGE UP (ref 1.8–2.4)
MCHC RBC-ENTMCNC: 30.3 PG — SIGNIFICANT CHANGE UP (ref 27–31)
MCHC RBC-ENTMCNC: 34.6 G/DL — SIGNIFICANT CHANGE UP (ref 32–37)
MCV RBC AUTO: 87.7 FL — SIGNIFICANT CHANGE UP (ref 81–99)
NRBC # BLD: 9 /100 WBCS — HIGH (ref 0–0)
PLATELET # BLD AUTO: 159 K/UL — SIGNIFICANT CHANGE UP (ref 130–400)
POTASSIUM SERPL-MCNC: 4.2 MMOL/L — SIGNIFICANT CHANGE UP (ref 3.5–5)
POTASSIUM SERPL-SCNC: 4.2 MMOL/L — SIGNIFICANT CHANGE UP (ref 3.5–5)
PROT SERPL-MCNC: 6.3 G/DL — SIGNIFICANT CHANGE UP (ref 6–8)
RBC # BLD: 2.44 M/UL — LOW (ref 4.2–5.4)
RBC # BLD: 2.44 M/UL — LOW (ref 4.2–5.4)
RBC # FLD: 16.9 % — HIGH (ref 11.5–14.5)
RETICS #: 70 K/UL — SIGNIFICANT CHANGE UP (ref 25–125)
RETICS/RBC NFR: 2.9 % — HIGH (ref 0.5–1.5)
SODIUM SERPL-SCNC: 139 MMOL/L — SIGNIFICANT CHANGE UP (ref 135–146)
WBC # BLD: 2.84 K/UL — LOW (ref 4.8–10.8)
WBC # FLD AUTO: 2.84 K/UL — LOW (ref 4.8–10.8)

## 2018-06-06 RX ADMIN — AMPICILLIN SODIUM AND SULBACTAM SODIUM 200 GRAM(S): 250; 125 INJECTION, POWDER, FOR SUSPENSION INTRAMUSCULAR; INTRAVENOUS at 05:38

## 2018-06-06 RX ADMIN — OXYCODONE HYDROCHLORIDE 5 MILLIGRAM(S): 5 TABLET ORAL at 08:46

## 2018-06-06 RX ADMIN — AMPICILLIN SODIUM AND SULBACTAM SODIUM 200 GRAM(S): 250; 125 INJECTION, POWDER, FOR SUSPENSION INTRAMUSCULAR; INTRAVENOUS at 12:44

## 2018-06-06 RX ADMIN — ANASTROZOLE 1 MILLIGRAM(S): 1 TABLET ORAL at 11:50

## 2018-06-06 RX ADMIN — PANTOPRAZOLE SODIUM 40 MILLIGRAM(S): 20 TABLET, DELAYED RELEASE ORAL at 05:39

## 2018-06-06 RX ADMIN — BUDESONIDE AND FORMOTEROL FUMARATE DIHYDRATE 2 PUFF(S): 160; 4.5 AEROSOL RESPIRATORY (INHALATION) at 08:32

## 2018-06-06 RX ADMIN — BUDESONIDE AND FORMOTEROL FUMARATE DIHYDRATE 2 PUFF(S): 160; 4.5 AEROSOL RESPIRATORY (INHALATION) at 19:48

## 2018-06-06 RX ADMIN — OXYCODONE HYDROCHLORIDE 5 MILLIGRAM(S): 5 TABLET ORAL at 21:05

## 2018-06-06 RX ADMIN — PREGABALIN 1000 MICROGRAM(S): 225 CAPSULE ORAL at 11:48

## 2018-06-06 RX ADMIN — Medication 60 MILLIGRAM(S): at 05:39

## 2018-06-06 RX ADMIN — NYSTATIN CREAM 1 APPLICATION(S): 100000 CREAM TOPICAL at 05:39

## 2018-06-06 RX ADMIN — PANTOPRAZOLE SODIUM 40 MILLIGRAM(S): 20 TABLET, DELAYED RELEASE ORAL at 17:28

## 2018-06-06 RX ADMIN — NYSTATIN CREAM 1 APPLICATION(S): 100000 CREAM TOPICAL at 17:29

## 2018-06-06 NOTE — PROGRESS NOTE ADULT - ASSESSMENT
Lymphadenopathy with pancytopenia: ?Lymphoma. Lymphadenopathy especially in axilla could also be explained by possible breast Ca recurrence.   --S/P axillary LN biopsy and bone marrow biopsy  --Awaiting pathology.      Macrocytic Anemia: Has had acute drop again. Denies having bleeding.   --Multifactorial (GI bleeding + ?anemia of chronic disease + mild AIHA)  -- B12 and folate WNL. MMA elevated. B12 1000mcg daily x 7days followed by once a week followed by once a month.  -- Monitor Hgb.  -- Continue prednisone 60 mg  for AIHA. Transfuse as needed.     HyperBilirubinemia:   --Predominantly direct bilirubinemia  --sec to hemolysis vs ?ductal obstruction from pancreatic head mass.   --MRCP report revealed IPMN.       DVT/GI ppx Lymphadenopathy with pancytopenia: ?Lymphoma. Lymphadenopathy especially in axilla could also be explained by possible breast Ca recurrence.   --S/P axillary LN biopsy and bone marrow biopsy  --Awaiting pathology. 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 Will follow.       Macrocytic Anemia: Has had acute drop again. Denies having bleeding.   --Multifactorial (GI bleeding + ?anemia of chronic disease + mild AIHA)  -- B12 and folate WNL. MMA elevated. B12 1000mcg daily x 7days followed by once a week followed by once a month.  -- Monitor Hgb.  -- Continue prednisone 60 mg  for AIHA. Transfuse as needed.     HyperBilirubinemia:   --Predominantly direct bilirubinemia  --sec to hemolysis vs ?ductal obstruction from pancreatic head mass.   --MRCP report revealed IPMN.       DVT/GI ppx Lymphadenopathy with pancytopenia: ?Lymphoma. Lymphadenopathy especially in axilla could also be explained by possible breast Ca recurrence.   --S/P axillary LN biopsy and bone marrow biopsy  --Awaiting pathology. 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 Will follow.       Macrocytic Anemia: Has had acute drop again. Denies having bleeding.   --Multifactorial (GI bleeding + ?anemia of chronic disease + mild AIHA)  -- B12 and folate WNL. MMA elevated. B12 1000mcg daily x 7days followed by once a week followed by once a month.  -- Monitor Hgb.  -- Continue prednisone 60 mg  for AIHA. Transfuse as needed.     HyperBilirubinemia:   --Predominantly direct bilirubinemia  --sec to hemolysis vs ?ductal obstruction from pancreatic head mass.   --MRCP report revealed IPMN.       DVT/GI ppx

## 2018-06-06 NOTE — PROGRESS NOTE ADULT - ASSESSMENT
#) Acute lymphoblastic leukemia:  -Awaiting transfer to [place]   -Hgb today 7.4  -pt has elevated LDH, high retic count, positive warm antibodies,  -has history of lower GI bleeding few months ago, and was found to have positive guaiac in ED, no active bleed now, s/p 3 transfusion on admission  -transfused 2 units pRBC on 05/29/18  -Sx following (Dr. Guzman)- s/p L axillary LN bx at [facility] on 05/30/18  -transfused 2 units pRBC on 05/31/18  -capsule endscopy possibly as OP  -Heme/Onc f/u (Dr. Miller)- prednisone 60 mg today for AIHA. Transfuse as needed. Awaiting biopsy results, b12 1000mg daily for 7 days, and then once a week   -IR (Dr. Hernandez)- s/p bone marrow bx (06/01/18)  -MRCP completed-- AWAITING GI follow up     #) Cough and exertional shortness of breath  -resolved SOB, cough improved  -Patient might have non diagnosed COPD (chronic smoking 50 pack year, stopped only 4 months ago)  -possible acute bronchitis / viral upper resp tract infection    #) History of buttock skin lesion s/p excision during hemorrhoidectomy surgery  -Pathology showing malignancy of undetermined origin, clean margins of excision  -Needs to follow up with oncology and address this issue too    #) Breast cancer s/p lumpectomy and currently on anastrozole  -c/w anastrazole    #) DVT/ GI ppx  -encourage ambulation, sequentials/ protonix    #) Code Status  -full code    #) Dispo  -from home     Attending Attestation:    Pt seen and examined at bedside. Case and Plan discussed with Team and Patient. Pt doing much better today. HG = 9.3 from 6.6 post 1PRBC transfusion. Pt is on steroids for Hemolytic Anemia - Warm Antibodies Positive. Stop IVF if Blood cxs come back Negative. Monitor daily CBC and Hematology to determine dispo time to home. Will need f/u with Heme / PMD / BM and LN Bxs if reports not back this hospitalization.     Dispo: Will be HOME upon Heme/Onc clearance. #) newly diagnosed Acute lymphoblastic leukemia:  -Awaiting transfer to [place] for treatment. Arranged by Dr. Nelson   -Hgb today 7.4  -pt has elevated LDH, high retic count, positive warm antibodies  -has history of lower GI bleeding few months ago, and was found to have positive guaiac in ED, no active bleed now, s/p 3 transfusion on admission  -transfused 2 units pRBC on 05/29/18  -Sx following (Dr. Guzman)- s/p L axillary LN bx at [facility] on 05/30/18  -transfused 2 units pRBC on 05/31/18  -capsule endscopy possibly as OP  -Heme/Onc f/u (Dr. Miller)- continue prednisone 60 mg or AIHA. Transfuse as needed. Continue B12 1000mg daily for 7 days, and then once a week   -IR (Dr. Hernandez)- s/p bone marrow bx (06/01/18)  -MRCP completed-- multiple pancreatic cyst lesions, benign but need to be followed as an outpatient.     #) Cough and exertional shortness of breath  -resolved SOB, cough improved  -Patient might have non diagnosed COPD (chronic smoking 50 pack year, stopped only 4 months ago)  -possible acute bronchitis / viral upper resp tract infection    #) History of buttock skin lesion s/p excision during hemorrhoidectomy surgery  -Pathology showing malignancy of undetermined origin, clean margins of excision  -Needs to follow up with oncology and address this issue too    #) Breast cancer s/p lumpectomy and currently on anastrozole  -c/w anastrazole    #) DVT/ GI ppx  -encourage ambulation, sequentials/ protonix    #) Code Status  -full code    #) Dispo  -transfer to [place]

## 2018-06-06 NOTE — PROGRESS NOTE ADULT - SUBJECTIVE AND OBJECTIVE BOX
SUBJECTIVE:    Patient is a 67y old Female who presents with a chief complaint of cough, dyspnea on exertion, generalized weakness (21 May 2018 16:39)    Currently admitted to medicine with the primary diagnosis of Anemia     Today is hospital day 16d. This morning she is resting comfortably in bed and reports no new issues or overnight events.   C/o pain in the left axilla.     PAST MEDICAL & SURGICAL HISTORY  Gastrointestinal bleeding, lower: In january 2018  Breast CA  History of total left hip replacement  Status post right breast lumpectomy    SOCIAL HISTORY:  Negative for smoking/alcohol/drug use.     ALLERGIES:  No Known Allergies    MEDICATIONS:  STANDING MEDICATIONS  ALBUTerol    0.083% 2.5 milliGRAM(s) Nebulizer every 6 hours  ampicillin/sulbactam  IVPB 3 Gram(s) IV Intermittent every 6 hours  anastrozole 1 milliGRAM(s) Oral daily  buDESOnide 160 MICROgram(s)/formoterol 4.5 MICROgram(s) Inhaler 2 Puff(s) Inhalation two times a day  cyanocobalamin Injectable 1000 MICROGram(s) IntraMuscular daily  nicotine -  14 mG/24Hr(s) Patch 1 patch Transdermal daily  nystatin Powder 1 Application(s) Topical two times a day  pantoprazole    Tablet 40 milliGRAM(s) Oral two times a day  predniSONE   Tablet 60 milliGRAM(s) Oral daily    PRN MEDICATIONS  acetaminophen   Tablet 650 milliGRAM(s) Oral every 6 hours PRN  guaiFENesin    Syrup 100 milliGRAM(s) Oral every 6 hours PRN  morphine  - Injectable 1 milliGRAM(s) IV Push every 4 hours PRN  oxyCODONE    IR 5 milliGRAM(s) Oral three times a day PRN    VITALS:   T(F): 97.8  HR: 68  BP: 148/67  RR: 18  SpO2: --    LABS:                        7.4    2.84  )-----------( 159      ( 06 Jun 2018 07:52 )             21.4     06-06    139  |  101  |  18  ----------------------------<  176<H>  4.2   |  27  |  0.5<L>    Ca    8.3<L>      06 Jun 2018 07:52  Mg     1.9     06-06    TPro  6.3  /  Alb  3.0<L>  /  TBili  1.9<H>  /  DBili  0.7<H>  /  AST  28  /  ALT  46<H>  /  AlkPhos  203<H>  06-06                  RADIOLOGY:    PHYSICAL EXAM:  GEN: No acute distress  LUNGS: Clear to auscultation bilaterally   HEART: S1/S2 present. RRR.   ABD: Soft, non-tender, non-distended. Bowel sounds present  EXT: NC/NC/NE/2+PP/RAJAN  NEURO: AAOX3

## 2018-06-06 NOTE — PROGRESS NOTE ADULT - SUBJECTIVE AND OBJECTIVE BOX
SUBJECTIVE:    Patient is a 67y old Female who presents with a chief complaint of cough, dyspnea on exertion, generalized weakness (21 May 2018 16:39)    Currently admitted to medicine with the primary diagnosis of Anemia     Today is hospital day 16d. This morning she is resting comfortably in bed and reports no new issues or overnight events.     PAST MEDICAL & SURGICAL HISTORY  Gastrointestinal bleeding, lower: In january 2018  Breast CA  History of total left hip replacement  Status post right breast lumpectomy    SOCIAL HISTORY:  Negative for smoking/alcohol/drug use.     ALLERGIES:  No Known Allergies    MEDICATIONS:  STANDING MEDICATIONS  ALBUTerol    0.083% 2.5 milliGRAM(s) Nebulizer every 6 hours  anastrozole 1 milliGRAM(s) Oral daily  buDESOnide 160 MICROgram(s)/formoterol 4.5 MICROgram(s) Inhaler 2 Puff(s) Inhalation two times a day  cyanocobalamin Injectable 1000 MICROGram(s) IntraMuscular daily  nicotine -  14 mG/24Hr(s) Patch 1 patch Transdermal daily  nystatin Powder 1 Application(s) Topical two times a day  pantoprazole    Tablet 40 milliGRAM(s) Oral two times a day  predniSONE   Tablet 60 milliGRAM(s) Oral daily    PRN MEDICATIONS  acetaminophen   Tablet 650 milliGRAM(s) Oral every 6 hours PRN  guaiFENesin    Syrup 100 milliGRAM(s) Oral every 6 hours PRN  morphine  - Injectable 1 milliGRAM(s) IV Push every 4 hours PRN  oxyCODONE    IR 5 milliGRAM(s) Oral three times a day PRN    VITALS:   T(F): 97.8  HR: 68  BP: 148/67  RR: 18  SpO2: --    LABS:                        7.4    2.84  )-----------( 159      ( 06 Jun 2018 07:52 )             21.4     06-06    139  |  101  |  18  ----------------------------<  176<H>  4.2   |  27  |  0.5<L>    Ca    8.3<L>      06 Jun 2018 07:52  Mg     1.9     06-06    TPro  6.3  /  Alb  3.0<L>  /  TBili  1.9<H>  /  DBili  0.7<H>  /  AST  28  /  ALT  46<H>  /  AlkPhos  203<H>  06-06          RADIOLOGY:  < from: MR Abdomen No Cont (06.04.18 @ 18:43) >  Impression:  Examination terminated early due to patient condition; no DWI, axial T2   fat sat or contrast images obtained, limiting evaluation.    1. 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 Follow-up MRCP in one year recommended.    2. Renal lesions, abdominal enlarged lymph nodes better evaluated on   postcontrast CT May 24, 2018. Splenomegaly, 15.7 cm CC.          < end of copied text >    PHYSICAL EXAM:  GEN: No acute distress  LUNGS: Clear to auscultation bilaterally   HEART: S1/S2 present. RRR.   ABD: Soft, non-tender, non-distended. Bowel sounds present  EXT: NC/NC/NE/2+PP/RAJAN  NEURO: AAOX3

## 2018-06-06 NOTE — PROGRESS NOTE ADULT - ATTENDING COMMENTS
66 yo female patient with PMHx of right breast cancer (Well to moderately differentiated invasive ductal carcinoma ER+, [place]+, Her-2 neg, stage T1c, N0, M0) s/p lumpectomy around 1.5 years ago, followed by radiation therapy and currently maintained on anastrazole, history of lower GI bleeding back in January 2018 which was attributed to hemorrhoids, s/p hemorrhoidectomy and fissurotomy, currently presented because of worsening generalized weakness, dyspnea on exertion and cough was found to have a Hg 5.8.    # Profound symptomatic anemia  Although Guaiac was initially positive but EGD (5/23) only showed Gastritis. Hence resume regular diet.   Lab called to inform that her Direct Coomb's Test was positive with positive Warm Antibodies, Anti-c, Anti-E. (done from the sample before she received transfusions).  Lab sending out to another [place] lab a sample from now (post  transfusion) was confirmation.   LDH is high but haptoglobin Normal. Check with Onc if this could be Warm AIHA ? Could it be related to her Anastrozole ?  i/v/o Pancytopenia, MDS also a differential .   GI bleed is  still a possibility as well. Although EGD was negative, GI planning a capsule endoscopy soon.   CT Abdo now shows a new splenomegaly since January.   There are multiple Abdominal Lymph nodes & scattered hypodense renal cortical lesions. This raises the doubts of a possible lymphoma/malignancy ?   Per Onc - needs axillary LN biopsy. Prefer it inpatient. Sx will do it on Wednesday.   And eventually patient can get outpatient PET scan.   Also check with GI to get Capsule endoscopy done.     # Left > Right Axillary Lymph Nodes. Possible Reactional, but with history of a buttock skin lesion that was resected in 12/2017 (but found to be benign cancer), i/v/o above noted findings of CT Abdo, Sx will biopsy it on wednesday.   # Recently resected Buttock skin lesion : found to be benign cancer. Maintain outpatient f/u with Onc.     # # Breast cancer s/p lumpectomy and currently on anastrozole  continue with anastrozole for now unless oncology would stop it for now.  Supposidly patient was in remission, but on the current CT chest there was some axillary lymph nodes. Patient complaining of pain at the site.      # Small Pancreatic hypodense lesion : Request GI to take a look at this and give us their opinion.    #RANDALL : d/t severe Anemia     # Cough   Patient might have non diagnosed COPD (chronic smoking 50 pack year, stopped only 4 months ago)   possible acute bronchitis / viral upper resp tract infection  Will continue with symptomatic treatment only. no indication of antibiotics. will give some nebulizers and assess for symptoms improvement.  Consider Pulmonary consult (can be done outpatient) for PFTs r/o copd        # DVT proph (encourage ambulation, sequentials) no heparin, possible GI losses  GI prophylaxis  Regular diet  ambulate as tolerated  Full code  dispo home.
68 yo female patient with PMHx of right breast cancer (Well to moderately differentiated invasive ductal carcinoma ER+, [place]+, Her-2 neg, stage T1c, N0, M0) s/p lumpectomy around 1.5 years ago, followed by radiation therapy and currently maintained on anastrazole, history of lower GI bleeding back in January 2018 which was attributed to hemorrhoids, s/p hemorrhoidectomy and fissurotomy, currently presented because of worsening generalized weakness, dyspnea on exertion and cough was found to have a Hg 5.8.    # Profound symptomatic anemia / Pancytopenia   Hematological/Malignancy related vs AIHA vs less likely GI bleed.   Although Guaiac was initially positive but EGD (5/23) only showed Gastritis.  her Direct Coomb's Test was positive with positive Warm Antibodies, Anti-c, Anti-E. (done from the sample before she received transfusions).  Lab sending out to another [place] lab a sample from now (post  transfusion) was confirmation.   LDH is high but haptoglobin Normal. Check with Onc if this could be Warm AIHA ? Could it be related to her Anastrozole ?  i/v/o Pancytopenia, MDS also a differential .   GI bleed is  still a possibility as well. Although EGD was negative, GI was planning a capsule endoscopy but deferred for now for the other heme w/u.   CT Abdo now shows a new splenomegaly since January.   There are multiple Abdominal Lymph nodes & scattered hypodense renal cortical lesions. Has low grade temps now. This raises the doubts of a possible lymphoma/malignancy ?   Per Onc - needs axillary LN biopsy. Prefer it inpatient. Sx will do it on Wednesday.   And eventually patient can get outpatient PET scan.   Also check with GI to get Capsule endoscopy done some time this admission ?   Patient was skeptical now agreed to stay when explained things will be faster if she stays and hematology wants to get the biopsy asap.    # Left > Right Axillary Lymph Nodes. Possible Reactional, but with history of a buttock skin lesion that was resected in 12/2017 (but found to be benign cancer), i/v/o above noted findings of CT Abdo, Sx will biopsy it on wednesday.   # Recently resected Buttock skin lesion : found to be benign cancer. Maintain outpatient f/u with Onc.     # # Breast cancer s/p lumpectomy and currently on anastrozole  continue with anastrozole for now unless oncology would stop it for now.  Supposidly patient was in remission, but on the current CT chest there was some axillary lymph nodes. Patient complaining of pain at the site.      # Small Pancreatic hypodense lesion : Request GI to take a look at this and give us their opinion.    #RANDALL : d/t severe Anemia     # Cough   Patient might have non diagnosed COPD (chronic smoking 50 pack year, stopped only 4 months ago)   possible acute bronchitis / viral upper resp tract infection  Will continue with symptomatic treatment only. no indication of antibiotics. will give some nebulizers and assess for symptoms improvement.  Consider Pulmonary consult (can be done outpatient) for PFTs r/o copd        # DVT proph (encourage ambulation, sequentials) no heparin, possible GI losses  GI prophylaxis  Regular diet  ambulate as tolerated  Full code  dispo home.      FOR AXILLARY LN BIOPSY ON WEDNESDAY.
68 yo female patient with PMHx of right breast cancer (Well to moderately differentiated invasive ductal carcinoma ER+, [place]+, Her-2 neg, stage T1c, N0, M0) s/p lumpectomy around 1.5 years ago, followed by radiation therapy and currently maintained on anastrazole, history of lower GI bleeding back in January 2018 which was attributed to hemorrhoids, s/p hemorrhoidectomy and fissurotomy, currently presented because of worsening generalized weakness, dyspnea on exertion and cough was found to have a Hg 5.8.    # Profound symptomatic anemia  Although Guaiac was initially positive but EGD (5/23) only showed Gastritis. Hence resume regular diet.   Lab called to inform that her Direct Coomb's Test was positive with positive Warm Antibodies, Anti-c, Anti-E. (done from the sample before she received transfusions).  Lab sending out to another [place] lab a sample from now (post  transfusion) was confirmation.   LDH is high but haptoglobin Normal. Check with Onc if this could be Warm AIHA ? Could it be related to her Anastrozole ?  i/v/o Pancytopenia, MDS also a differential .   GI bleed is  still a possibility as well. Although EGD was negative, GI planning a capsule endoscopy soon.   CT Abdo now shows a new splenomegaly since January.   There are multiple Abdominal Lymph nodes & scattered hypodense renal cortical lesions. This raises the doubts of a possible lymphoma/malignancy ?   f/u Onc, on what biopsy is indicated ?     # Left > Right Axillary Lymph Nodes. Possible Reactional, but with history of a buttock skin lesion that was resected in 12/2017 (but found to be benign cancer), i/v/o above noted findings of CT Abdo, f/u Onc on what to biopsy.  # Recently resected Buttock skin lesion : found to be benign cancer. Maintain outpatient f/u with Onc.     # # Breast cancer s/p lumpectomy and currently on anastrozole  continue with anastrozole for now unless oncology would stop it for now.  Supposidly patient was in remission, but on the current CT chest there was some axillary lymph nodes. Patient complaining of pain at the site.  R/O recurrence of disease, might require biopsy? Outpatient PET ? for now just CT Abdo    # Small Pancreatic hypodense lesion : Request GI to take a look at this and give us their opinion.    #RANDALL : d/t severe Anemia     # Cough   Patient might have non diagnosed COPD (chronic smoking 50 pack year, stopped only 4 months ago)   possible acute bronchitis / viral upper resp tract infection  Will continue with symptomatic treatment only. no indication of antibiotics. will give some nebulizers and assess for symptoms improvement.  Consider Pulmonary consult (can be done outpatient) for PFTs r/o copd        # DVT proph (encourage ambulation, sequentials) no heparin, possible GI losses  GI prophylaxis  Regular diet  ambulate as tolerated  Full code  dispo home
68 yo female patient with PMHx of right breast cancer (Well to moderately differentiated invasive ductal carcinoma ER+, [place]+, Her-2 neg, stage T1c, N0, M0) s/p lumpectomy around 1.5 years ago, followed by radiation therapy and currently maintained on anastrazole, history of lower GI bleeding back in January 2018 which was attributed to hemorrhoids, s/p hemorrhoidectomy and fissurotomy, currently presented because of worsening generalized weakness, dyspnea on exertion and cough was found to have a Hg 5.8.    # Profound symptomatic anemia / Pancytopenia   Hematological/Malignancy related vs AIHA vs less likely GI bleed.   Although Guaiac was initially positive but EGD (5/23) only showed Gastritis.  her Direct Coomb's Test was positive with positive Warm Antibodies, Anti-c, Anti-E. (done from the sample before she received transfusions).  Lab sending out to another [place] lab a sample from now (post  transfusion) was confirmation.   LDH is high but haptoglobin Normal. Check with Onc if this could be Warm AIHA ? Could it be related to her Anastrozole ?  i/v/o Pancytopenia, MDS also a differential .   GI bleed is  still a possibility as well. Although EGD was negative, GI was planning a capsule endoscopy but deferred for now for the other heme w/u.   CT Abdo now shows a new splenomegaly since January.   There are multiple Abdominal Lymph nodes & scattered hypodense renal cortical lesions. Has low grade temps now. This raises the doubts of a possible lymphoma/malignancy ?   Per Onc - needs axillary LN biopsy. Prefer it inpatient. Sx will do it on Wednesday.   And eventually patient can get outpatient PET scan.   Also check with GI to get Capsule endoscopy done some time this admission ?   Patient was skeptical now agreed to stay when explained things will be faster if she stays and hematology wants to get the biopsy asap.    # Left > Right Axillary Lymph Nodes. Possible Reactional, but with history of a buttock skin lesion that was resected in 12/2017 (but found to be benign cancer), i/v/o above noted findings of CT Abdo, Sx will biopsy it on wednesday.   # Recently resected Buttock skin lesion : found to be benign cancer. Maintain outpatient f/u with Onc.     # # Breast cancer s/p lumpectomy and currently on anastrozole  continue with anastrozole for now unless oncology would stop it for now.  Supposidly patient was in remission, but on the current CT chest there was some axillary lymph nodes. Patient complaining of pain at the site.      # Small Pancreatic hypodense lesion : Request GI to take a look at this and give us their opinion.    #RANDALL : d/t severe Anemia     # Cough   Patient might have non diagnosed COPD (chronic smoking 50 pack year, stopped only 4 months ago)   possible acute bronchitis / viral upper resp tract infection  Will continue with symptomatic treatment only. no indication of antibiotics. will give some nebulizers and assess for symptoms improvement.  Consider Pulmonary consult (can be done outpatient) for PFTs r/o copd        # DVT proph (encourage ambulation, sequentials) no heparin, possible GI losses  GI prophylaxis  Regular diet  ambulate as tolerated  Full code  dispo home.
As noted above, no complaint (some soreness at incision).  afeb vss  op site clean, small hematoma?    doing well  await path
Hb slightly improved with Prednisone, continue at current dose.  Pathology and MRCP still pending.
pt with defuse lymphadenopathy.    Pt with anemia likely hemolytic/autoimmune.    Will scheduled for axillary LN biopsy.
#) newly diagnosed Acute lymphoblastic leukemia:  -Awaiting transfer to [place] for treatment. Arranged by Dr. Nelson     #Pancytopenia/Splenomegaly : d/t above.   Monitor Hgb.    -Hgb today 7.4  -pt has elevated LDH, high retic count, positive warm antibodies  -has history of lower GI bleeding few months ago, and was found to have positive guaiac in ED, no active bleed now, s/p 3 transfusion on admission  -transfused 2 units pRBC on 05/29/18  -Sx following (Dr. Guzman)- s/p L axillary LN bx at [facility] on 05/30/18  -transfused 2 units pRBC on 05/31/18  -capsule endscopy possibly as OP  -Heme/Onc f/u (Dr. Miller)- continue prednisone 60 mg or AIHA. - CAN WE D/c THIS SINCE THIS IS NOT AIHA. Transfuse as needed. Continue B12 1000mg daily for 7 days, and then once a week   -IR (Dr. Hernandez)- s/p bone marrow bx (06/01/18)  -MRCP completed-- multiple pancreatic cyst lesions, benign but need to be followed as an outpatient.     #) Cough and exertional shortness of breath  -resolved SOB, cough improved  -Patient might have non diagnosed COPD (chronic smoking 50 pack year, stopped only 4 months ago)  -possible acute bronchitis / viral upper resp tract infection    #) History of buttock skin lesion s/p excision during hemorrhoidectomy surgery  -Pathology showing malignancy of undetermined origin, clean margins of excision  -Needs to follow up with oncology and address this issue too    #) Breast cancer s/p lumpectomy and currently on anastrozole  -c/w anastrazole    #) DVT/ GI ppx  -encourage ambulation, sequentials/ protonix    #) Code Status  -full code    #) Dispo  -transfer to [place]
68 yo female patient with PMHx of right breast cancer (Well to moderately differentiated invasive ductal carcinoma ER+, [place]+, Her-2 neg, stage T1c, N0, M0) s/p lumpectomy around 1.5 years ago, followed by radiation therapy and currently maintained on anastrazole, history of lower GI bleeding back in January 2018 which was attributed to hemorrhoids, s/p hemorrhoidectomy and fissurotomy, currently presented because of worsening generalized weakness, dyspnea on exertion and cough was found to have a Hg 5.8.    # Profound symptomatic anemia / Pancytopenia   Hematological/Malignancy related vs AIHA vs less likely GI bleed.   Although Guaiac was initially positive but EGD (5/23) only showed Gastritis.  her Direct Coomb's Test was positive with positive Warm Antibodies, Anti-c, Anti-E. (done from the sample before she received transfusions).  Lab sending out to another [place] lab a sample from now (post  transfusion) was confirmation.   LDH is high but haptoglobin Normal. Check with Onc if this could be Warm AIHA ? Could it be related to her Anastrozole ?  i/v/o Pancytopenia, MDS also a differential .   GI bleed is  still a possibility as well. Although EGD was negative, GI was planning a capsule endoscopy but deferred for now for the other heme w/u.   CT Abdo now shows a new splenomegaly since January.   There are multiple Abdominal Lymph nodes & scattered hypodense renal cortical lesions. Has low grade temps now. This raises the doubts of a possible lymphoma/malignancy ?   Per Onc - needs axillary LN biopsy. Prefer it inpatient. Sx will do it on Wednesday.   And eventually patient can get outpatient PET scan.   Also check with GI to get Capsule endoscopy done some time this admission ?   Patient was skeptical now agreed to stay when explained things will be faster if she stays and hematology wants to get the biopsy asap.    5/29 : Heme believes anemia is likely lymphoma. i/v/o some blasts in PBS, lymphoblastic lymphoma also possible. Patient might also need BM Bx in future. But for now just start with Axillary LN biopsy    # Left > Right Axillary Lymph Nodes. Possible Reactional, but with history of a buttock skin lesion that was resected in 12/2017 (but found to be benign cancer), i/v/o above noted findings of CT Abdo, Sx will biopsy axillary LN.   Deferred from today d/t another drop in Hgb.   # Recently resected Buttock skin lesion : found to be benign cancer. Maintain outpatient f/u with Onc.     # # Breast cancer s/p lumpectomy and currently on anastrozole  continue with anastrozole for now unless oncology would stop it for now.  Supposidly patient was in remission, but on the current CT chest there was some axillary lymph nodes. Patient complaining of pain at the site.      # Small Pancreatic hypodense lesion : Request GI to take a look at this and give us their opinion.    #RANDALL : d/t severe Anemia     # Cough   Patient might have non diagnosed COPD (chronic smoking 50 pack year, stopped only 4 months ago)   possible acute bronchitis / viral upper resp tract infection  Will continue with symptomatic treatment only. no indication of antibiotics. will give some nebulizers and assess for symptoms improvement.  Consider Pulmonary consult (can be done outpatient) for PFTs r/o copd        # DVT proph (encourage ambulation, sequentials) no heparin, possible GI losses  GI prophylaxis  Regular diet  ambulate as tolerated  Full code  dispo home.      FOR AXILLARY LN BIOPSY tomorrow
Patient not available on site during morning rounds, transferred to [facility] for surgical procedure.
68 yo female has leukopenia, glenys positive AIHA, lymphadenopathy. 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    Recommendation:  1. Followup final pathology report on BM biopsy and cytogenetic study.  2. Order BCR-ABL on BM aspiration sample.    3. Continue prednisone for now.  4. Start allopurinol 300 mg daily.  5. Discussed preliminary diagnosis with the patient. She will need aggressive induction chemotherapy followed by consolidation therapy. Will try transferring her to leukemia service at [place] or [place].
Findings of the CT A/P were discussed with patient, this is suspicious for malignancy.  Imaging was also reviewed with IR.   Axillary node appears to be amendable for excisional biopsy. Patient is agreeable to get this. Please consult Dr. Ocampo.   Hb has been stable.
Patient was seen and examined bedside both yesterday and today. Case was also at length discussed with surgery yesterday. I have conferred that doing axillary excisional biopsy was medically necessary in urgent manner 2/2 possibility of aggressive heme malignancy.   Narcisa briefly holds on to transfused blood, however in the presence of Coomb's positive hemolysis starting her on high dose steroids maybe necessary based on post-transfusion CBC.   Additionally in presence of both direct and indirect bilirubinemia, I suggest repeating a CT of A/P.  She is for BMBx tomorrow in IR.  Case was discussed with Narcisa at length.
S/p BMBx today.   Will require MRCP per GI.   Consider starting on steroids at 1mg/kg daily if Hb drops. Daily labs, including hemolysis panel and LFTs.

## 2018-06-07 LAB
CHROM ANALY INTERPHASE BLD FISH-IMP: SIGNIFICANT CHANGE UP
HAPTOGLOB SERPL-MCNC: <20 MG/DL — LOW (ref 34–200)

## 2018-06-08 LAB — SURGICAL PATHOLOGY STUDY: SIGNIFICANT CHANGE UP

## 2018-06-11 LAB
CULTURE RESULTS: SIGNIFICANT CHANGE UP
SPECIMEN SOURCE: SIGNIFICANT CHANGE UP

## 2018-06-12 LAB — CHROM ANALY OVERALL INTERP SPEC-IMP: SIGNIFICANT CHANGE UP

## 2018-06-15 LAB — CHROM ANALY OVERALL INTERP SPEC-IMP: SIGNIFICANT CHANGE UP

## 2018-06-22 DIAGNOSIS — Z85.3 PERSONAL HISTORY OF MALIGNANT NEOPLASM OF BREAST: ICD-10-CM

## 2018-06-22 DIAGNOSIS — K44.9 DIAPHRAGMATIC HERNIA WITHOUT OBSTRUCTION OR GANGRENE: ICD-10-CM

## 2018-06-22 DIAGNOSIS — Z79.899 OTHER LONG TERM (CURRENT) DRUG THERAPY: ICD-10-CM

## 2018-06-22 DIAGNOSIS — D61.818 OTHER PANCYTOPENIA: ICD-10-CM

## 2018-06-22 DIAGNOSIS — C85.10 UNSPECIFIED B-CELL LYMPHOMA, UNSPECIFIED SITE: ICD-10-CM

## 2018-06-22 DIAGNOSIS — I80.8 PHLEBITIS AND THROMBOPHLEBITIS OF OTHER SITES: ICD-10-CM

## 2018-06-22 DIAGNOSIS — E80.7 DISORDER OF BILIRUBIN METABOLISM, UNSPECIFIED: ICD-10-CM

## 2018-06-22 DIAGNOSIS — Z87.891 PERSONAL HISTORY OF NICOTINE DEPENDENCE: ICD-10-CM

## 2018-06-22 DIAGNOSIS — K29.00 ACUTE GASTRITIS WITHOUT BLEEDING: ICD-10-CM

## 2018-06-22 DIAGNOSIS — C91.00 ACUTE LYMPHOBLASTIC LEUKEMIA NOT HAVING ACHIEVED REMISSION: ICD-10-CM

## 2018-06-22 DIAGNOSIS — K20.9 ESOPHAGITIS, UNSPECIFIED: ICD-10-CM

## 2018-06-22 DIAGNOSIS — K92.1 MELENA: ICD-10-CM

## 2018-06-22 DIAGNOSIS — J20.8 ACUTE BRONCHITIS DUE TO OTHER SPECIFIED ORGANISMS: ICD-10-CM

## 2018-06-22 DIAGNOSIS — D64.9 ANEMIA, UNSPECIFIED: ICD-10-CM

## 2018-06-22 DIAGNOSIS — R63.4 ABNORMAL WEIGHT LOSS: ICD-10-CM

## 2018-06-22 DIAGNOSIS — Z96.642 PRESENCE OF LEFT ARTIFICIAL HIP JOINT: ICD-10-CM

## 2018-07-13 PROBLEM — C50.919 MALIGNANT NEOPLASM OF UNSPECIFIED SITE OF UNSPECIFIED FEMALE BREAST: Chronic | Status: ACTIVE | Noted: 2018-05-21

## 2018-07-16 ENCOUNTER — APPOINTMENT (OUTPATIENT)
Dept: HEMATOLOGY ONCOLOGY | Facility: CLINIC | Age: 68
End: 2018-07-16

## 2018-07-16 ENCOUNTER — LABORATORY RESULT (OUTPATIENT)
Age: 68
End: 2018-07-16

## 2018-07-16 ENCOUNTER — APPOINTMENT (OUTPATIENT)
Dept: INFUSION THERAPY | Facility: CLINIC | Age: 68
End: 2018-07-16

## 2018-07-16 VITALS
HEART RATE: 82 BPM | TEMPERATURE: 96.8 F | WEIGHT: 125 LBS | BODY MASS INDEX: 22.15 KG/M2 | RESPIRATION RATE: 16 BRPM | DIASTOLIC BLOOD PRESSURE: 77 MMHG | HEIGHT: 63 IN | SYSTOLIC BLOOD PRESSURE: 133 MMHG

## 2018-07-16 LAB
HCT VFR BLD CALC: 25.1 %
HGB BLD-MCNC: 8.4 G/DL
MCHC RBC-ENTMCNC: 30.8 PG
MCHC RBC-ENTMCNC: 33.5 G/DL
MCV RBC AUTO: 91.9 FL
PLATELET # BLD AUTO: 171 K/UL
PMV BLD: 8.6 FL
RBC # BLD: 2.73 M/UL
RBC # FLD: 15.9 %
WBC # FLD AUTO: 3.04 K/UL

## 2018-07-16 RX ORDER — PEGFILGRASTIM-CBQV 6 MG/.6ML
6 INJECTION, SOLUTION SUBCUTANEOUS ONCE
Qty: 0 | Refills: 0 | Status: COMPLETED | OUTPATIENT
Start: 2018-07-16 | End: 2018-07-16

## 2018-07-16 RX ADMIN — PEGFILGRASTIM-CBQV 6 MILLIGRAM(S): 6 INJECTION, SOLUTION SUBCUTANEOUS at 11:33

## 2018-07-23 ENCOUNTER — EMERGENCY (EMERGENCY)
Facility: HOSPITAL | Age: 68
LOS: 0 days | Discharge: HOME | End: 2018-07-24
Attending: EMERGENCY MEDICINE | Admitting: EMERGENCY MEDICINE

## 2018-07-23 ENCOUNTER — LABORATORY RESULT (OUTPATIENT)
Age: 68
End: 2018-07-23

## 2018-07-23 ENCOUNTER — APPOINTMENT (OUTPATIENT)
Dept: HEMATOLOGY ONCOLOGY | Facility: CLINIC | Age: 68
End: 2018-07-23

## 2018-07-23 VITALS
HEART RATE: 100 BPM | TEMPERATURE: 98 F | OXYGEN SATURATION: 100 % | DIASTOLIC BLOOD PRESSURE: 50 MMHG | SYSTOLIC BLOOD PRESSURE: 97 MMHG | RESPIRATION RATE: 16 BRPM

## 2018-07-23 DIAGNOSIS — C93.01 ACUTE MONOBLASTIC/MONOCYTIC LEUKEMIA, IN REMISSION: ICD-10-CM

## 2018-07-23 DIAGNOSIS — Z96.642 PRESENCE OF LEFT ARTIFICIAL HIP JOINT: Chronic | ICD-10-CM

## 2018-07-23 DIAGNOSIS — Z98.890 OTHER SPECIFIED POSTPROCEDURAL STATES: Chronic | ICD-10-CM

## 2018-07-23 DIAGNOSIS — D69.6 THROMBOCYTOPENIA, UNSPECIFIED: ICD-10-CM

## 2018-07-23 DIAGNOSIS — D64.9 ANEMIA, UNSPECIFIED: ICD-10-CM

## 2018-07-23 PROBLEM — K92.2 GASTROINTESTINAL HEMORRHAGE, UNSPECIFIED: Chronic | Status: ACTIVE | Noted: 2018-05-21

## 2018-07-23 LAB
ALBUMIN SERPL ELPH-MCNC: 3.9 G/DL — SIGNIFICANT CHANGE UP (ref 3.5–5.2)
ALLERGY+IMMUNOLOGY DIAG STUDY NOTE: SIGNIFICANT CHANGE UP
ALP SERPL-CCNC: 275 U/L — HIGH (ref 30–115)
ALT FLD-CCNC: 77 U/L — HIGH (ref 0–41)
ANION GAP SERPL CALC-SCNC: 17 MMOL/L — HIGH (ref 7–14)
ANISOCYTOSIS BLD QL: SLIGHT — SIGNIFICANT CHANGE UP
AST SERPL-CCNC: 24 U/L — SIGNIFICANT CHANGE UP (ref 0–41)
BASOPHILS # BLD AUTO: 0 K/UL — SIGNIFICANT CHANGE UP (ref 0–0.2)
BASOPHILS NFR BLD AUTO: 0 % — SIGNIFICANT CHANGE UP (ref 0–1)
BILIRUB SERPL-MCNC: 0.8 MG/DL — SIGNIFICANT CHANGE UP (ref 0.2–1.2)
BLD GP AB SCN SERPL QL: ABNORMAL
BUN SERPL-MCNC: 13 MG/DL — SIGNIFICANT CHANGE UP (ref 10–20)
CALCIUM SERPL-MCNC: 9 MG/DL — SIGNIFICANT CHANGE UP (ref 8.5–10.1)
CHLORIDE SERPL-SCNC: 99 MMOL/L — SIGNIFICANT CHANGE UP (ref 98–110)
CK SERPL-CCNC: 27 U/L — SIGNIFICANT CHANGE UP (ref 0–225)
CO2 SERPL-SCNC: 22 MMOL/L — SIGNIFICANT CHANGE UP (ref 17–32)
CREAT SERPL-MCNC: 0.5 MG/DL — LOW (ref 0.7–1.5)
DAT IGG-SP REAG RBC-IMP: ABNORMAL
DIR ANTIGLOB POLYSPECIFIC INTERPRETATION: ABNORMAL
EOSINOPHIL # BLD AUTO: 0 K/UL — SIGNIFICANT CHANGE UP (ref 0–0.7)
EOSINOPHIL NFR BLD AUTO: 0 % — SIGNIFICANT CHANGE UP (ref 0–8)
GLUCOSE SERPL-MCNC: 135 MG/DL — HIGH (ref 70–99)
HCT VFR BLD CALC: 13.1 %
HCT VFR BLD CALC: 13.1 % — LOW (ref 37–47)
HGB BLD-MCNC: 4.4 G/DL — CRITICAL LOW (ref 12–16)
HGB BLD-MCNC: 4.5 G/DL
HYPOCHROMIA BLD QL: SIGNIFICANT CHANGE UP
IAT COMP-SP REAG SERPL QL: SIGNIFICANT CHANGE UP
LYMPHOCYTES # BLD AUTO: 0.21 K/UL — LOW (ref 1.2–3.4)
LYMPHOCYTES # BLD AUTO: 60 % — HIGH (ref 20.5–51.1)
MAGNESIUM SERPL-MCNC: 2.1 MG/DL — SIGNIFICANT CHANGE UP (ref 1.8–2.4)
MCHC RBC-ENTMCNC: 29.5 PG — SIGNIFICANT CHANGE UP (ref 27–31)
MCHC RBC-ENTMCNC: 30.4 PG
MCHC RBC-ENTMCNC: 33.6 G/DL — SIGNIFICANT CHANGE UP (ref 32–37)
MCHC RBC-ENTMCNC: 34.4 G/DL
MCV RBC AUTO: 87.9 FL — SIGNIFICANT CHANGE UP (ref 81–99)
MCV RBC AUTO: 88.5 FL
MONOCYTES # BLD AUTO: 0 K/UL — LOW (ref 0.1–0.6)
MONOCYTES NFR BLD AUTO: 0 % — LOW (ref 1.7–9.3)
NEUTROPHILS # BLD AUTO: 0.14 K/UL — LOW (ref 1.4–6.5)
NEUTROPHILS NFR BLD AUTO: 40 % — LOW (ref 42.2–75.2)
NRBC # BLD: 0 /100 — SIGNIFICANT CHANGE UP (ref 0–0)
NRBC # BLD: SIGNIFICANT CHANGE UP /100 WBCS (ref 0–0)
PLAT MORPH BLD: NORMAL — SIGNIFICANT CHANGE UP
PLATELET # BLD AUTO: 1 K/UL
PLATELET # BLD AUTO: 1 K/UL — CRITICAL LOW (ref 130–400)
PLATELET COUNT - ESTIMATE: ABNORMAL
PMV BLD: NORMAL
POIKILOCYTOSIS BLD QL AUTO: SLIGHT — SIGNIFICANT CHANGE UP
POTASSIUM SERPL-MCNC: 4.2 MMOL/L — SIGNIFICANT CHANGE UP (ref 3.5–5)
POTASSIUM SERPL-SCNC: 4.2 MMOL/L — SIGNIFICANT CHANGE UP (ref 3.5–5)
PROT SERPL-MCNC: 7.7 G/DL — SIGNIFICANT CHANGE UP (ref 6–8)
RBC # BLD: 1.48 M/UL
RBC # BLD: 1.49 M/UL — LOW (ref 4.2–5.4)
RBC # FLD: 15.2 %
RBC # FLD: 15.3 % — HIGH (ref 11.5–14.5)
RBC BLD AUTO: ABNORMAL
SODIUM SERPL-SCNC: 138 MMOL/L — SIGNIFICANT CHANGE UP (ref 135–146)
TYPE + AB SCN PNL BLD: SIGNIFICANT CHANGE UP
WBC # BLD: 0.35 K/UL — CRITICAL LOW (ref 4.8–10.8)
WBC # FLD AUTO: 0.35 K/UL — CRITICAL LOW (ref 4.8–10.8)
WBC # FLD AUTO: 0.45 K/UL

## 2018-07-23 RX ORDER — ANASTROZOLE 1 MG/1
1 TABLET ORAL
Qty: 0 | Refills: 0 | COMMUNITY

## 2018-07-23 NOTE — ED CDU PROVIDER INITIAL DAY NOTE - PHYSICAL EXAMINATION
CONSTITUTIONAL: + chronic ill-appearing; well-nourished; in no apparent distress.   EYES: PERRL; EOM intact.   ENT: normal nose; no rhinorrhea; normal pharynx with no tonsillar hypertrophy.   NECK: Supple; non-tender; no cervical lymphadenopathy. No JVD.   CARDIOVASCULAR: Normal S1, S2; no murmurs, rubs, or gallops.   RESPIRATORY: Normal chest excursion with respiration; breath sounds clear and equal bilaterally; no wheezes, rhonchi, or rales.  GI/: Normal bowel sounds; non-distended; non-tender; no palpable organomegaly.   MS: No evidence of trauma or deformity. Non-tender to palpation. No scoliosis. No CVA tenderness. Normal ROM in all four extremities; non-tender to palpation; distal pulses are normal.   SKIN: Normal for age and race; warm; dry; good turgor; no apparent lesions or exudate.   NEURO/PSYCH: A & O x 4; grossly unremarkable. mood and manner are appropriate. Grooming and personal hygiene are appropriate. No apparent thoughts of harm to self or others.

## 2018-07-23 NOTE — ED CDU PROVIDER INITIAL DAY NOTE - OBJECTIVE STATEMENT
68 yo female hx of ALL/Breast Cancer and lower GI Bleed in Jan,2018 sent in by PMD 2/2 blood transfusion. patient just received chemo about 2 weeks ago and found her Hgb <5 and Palate ~ 1 today so she was sent to ED for transfusion. patient denies bleeding from nose/mouth/urine and stool. denies any sxs now in EDOU. Denies fever/chill/HA/dizziness/chest pain/palpitation/sob/abd pain/n/v/d/ black stool/bloody stool/urinary sxs

## 2018-07-23 NOTE — ED CDU PROVIDER INITIAL DAY NOTE - PROGRESS NOTE DETAILS
Spoke with Dr Hudson (Hem/On Fellow) who spoke with Dr Nelson, wants to transfuse 2 units of pRBC And 1 unit of palate and repeat CBC. keep hgb > 7. If Palate < 2, transfuse another unit and check CBC again. Spoke with Dr Hudson (Hem/On Fellow) who spoke with Dr Nelson, wants to transfuse 2 units of pRBC And 1 unit of palate and repeat CBC. keep hgb > 7. If Palate < 20k, transfuse another unit and check CBC again.

## 2018-07-23 NOTE — ED PROVIDER NOTE - PROGRESS NOTE DETAILS
Dr. Nelson came to see pt-- she specifically told blood bank we need cmv negative, nucleo deplete, leuko depleted irradiated If questions call Dr. Nelson [phone] Signed out to me. Patient pancytopenic after induction chemo last week, will repeat CBC and transfuse as necessary. Dr. Nelson wants to start the pt on levoquin po once a day.  Wants a cbc in the morning.    pt has bactrum and valtrex-- she will take her own.

## 2018-07-23 NOTE — ED ADULT NURSE NOTE - CHIEF COMPLAINT QUOTE
Patient sent in from [facility] for low platelet count, low hgb, and low WBC count..

## 2018-07-23 NOTE — ED PROVIDER NOTE - MEDICAL DECISION MAKING DETAILS
68 yo F with history of AML, breat ca, recent induction chemo presented with pancytopenia, placed in observation for transfusion. Hem/onc will follow.

## 2018-07-23 NOTE — ED PROVIDER NOTE - OBJECTIVE STATEMENT
66y/o F w/ hx of breast ca. ALL, GI bleed 66y/o F w/ hx of breast ca. ALL, GI bleed is sent by Dr. [facility] oncologist -- hg 4.5 plt 1; wbc 0.45. wants 2 units prbc and 1 plt.  no cp or sob. no weakness or dizziness.

## 2018-07-23 NOTE — ED PROVIDER NOTE - PMH
Acute monoblastic leukemia in remission    Breast CA    Gastrointestinal bleeding, lower  In january 2018

## 2018-07-23 NOTE — ED PROVIDER NOTE - ATTENDING CONTRIBUTION TO CARE
Patient is a 66 yo female h/o ALL s/p induction chemo had routine blood tests showed pancytopenia so sent in. No symptoms no cp sob vomiting   no fevers chills    Patient is nontoxic ctab rrr no murmur abd soft nt     Plan: will discuss case with hemonc, PRBC transfusion, anticipate admission Patient is a 66 yo female h/o ALL s/p induction chemo had routine blood tests showed pancytopenia so sent in. No symptoms no cp sob vomiting   no fevers chills    Patient is nontoxic ctab rrr no murmur abd soft nt     Plan: will discuss case with hemonc, PRBC transfusion with irradiation, anticipate admission

## 2018-07-24 VITALS — DIASTOLIC BLOOD PRESSURE: 51 MMHG | SYSTOLIC BLOOD PRESSURE: 92 MMHG | HEART RATE: 79 BPM

## 2018-07-24 LAB
BASOPHILS # BLD AUTO: 0.01 K/UL — SIGNIFICANT CHANGE UP (ref 0–0.2)
BASOPHILS NFR BLD AUTO: 3.3 % — HIGH (ref 0–1)
EOSINOPHIL # BLD AUTO: 0.01 K/UL — SIGNIFICANT CHANGE UP (ref 0–0.7)
EOSINOPHIL NFR BLD AUTO: 3.3 % — SIGNIFICANT CHANGE UP (ref 0–8)
HCT VFR BLD CALC: 18.4 % — LOW (ref 37–47)
HCT VFR BLD CALC: 21.6 % — LOW (ref 37–47)
HGB BLD-MCNC: 6.2 G/DL — CRITICAL LOW (ref 12–16)
HGB BLD-MCNC: 7.4 G/DL — CRITICAL LOW (ref 12–16)
IMM GRANULOCYTES NFR BLD AUTO: 0 % — LOW (ref 0.1–0.3)
LYMPHOCYTES # BLD AUTO: 0.15 K/UL — LOW (ref 1.2–3.4)
LYMPHOCYTES # BLD AUTO: 50 % — SIGNIFICANT CHANGE UP (ref 20.5–51.1)
MCHC RBC-ENTMCNC: 28.8 PG — SIGNIFICANT CHANGE UP (ref 27–31)
MCHC RBC-ENTMCNC: 29.4 PG — SIGNIFICANT CHANGE UP (ref 27–31)
MCHC RBC-ENTMCNC: 33.7 G/DL — SIGNIFICANT CHANGE UP (ref 32–37)
MCHC RBC-ENTMCNC: 34.3 G/DL — SIGNIFICANT CHANGE UP (ref 32–37)
MCV RBC AUTO: 85.6 FL — SIGNIFICANT CHANGE UP (ref 81–99)
MCV RBC AUTO: 85.7 FL — SIGNIFICANT CHANGE UP (ref 81–99)
MONOCYTES # BLD AUTO: 0.01 K/UL — LOW (ref 0.1–0.6)
MONOCYTES NFR BLD AUTO: 3.3 % — SIGNIFICANT CHANGE UP (ref 1.7–9.3)
NEUTROPHILS # BLD AUTO: 0.12 K/UL — LOW (ref 1.4–6.5)
NEUTROPHILS NFR BLD AUTO: 40.1 % — LOW (ref 42.2–75.2)
NRBC # BLD: 0 /100 WBCS — SIGNIFICANT CHANGE UP (ref 0–0)
NRBC # BLD: 0 /100 WBCS — SIGNIFICANT CHANGE UP (ref 0–0)
PLATELET # BLD AUTO: 24 K/UL — LOW (ref 130–400)
PLATELET # BLD AUTO: 25 K/UL — LOW (ref 130–400)
RBC # BLD: 2.15 M/UL — LOW (ref 4.2–5.4)
RBC # BLD: 2.52 M/UL — LOW (ref 4.2–5.4)
RBC # FLD: 14.3 % — SIGNIFICANT CHANGE UP (ref 11.5–14.5)
RBC # FLD: 14.4 % — SIGNIFICANT CHANGE UP (ref 11.5–14.5)
WBC # BLD: 0.3 K/UL — CRITICAL LOW (ref 4.8–10.8)
WBC # BLD: 0.32 K/UL — CRITICAL LOW (ref 4.8–10.8)
WBC # FLD AUTO: 0.3 K/UL — CRITICAL LOW (ref 4.8–10.8)
WBC # FLD AUTO: 0.32 K/UL — CRITICAL LOW (ref 4.8–10.8)

## 2018-07-24 RX ORDER — VALACYCLOVIR 500 MG/1
1 TABLET, FILM COATED ORAL
Qty: 30 | Refills: 0 | OUTPATIENT
Start: 2018-07-24 | End: 2018-08-22

## 2018-07-24 RX ORDER — CIPROFLOXACIN LACTATE 400MG/40ML
1 VIAL (ML) INTRAVENOUS
Qty: 10 | Refills: 0 | OUTPATIENT
Start: 2018-07-24 | End: 2018-08-02

## 2018-07-24 NOTE — ED ADULT NURSE REASSESSMENT NOTE - NS ED NURSE REASSESS COMMENT FT1
2 units of transfusion finidhed . CBC drawn and send to lab .Waiting for disposition
7/23/2018 23:30   Blood trnsfusion started as ordered. VS stable .No reaction noted .Will continue to moniter
7/24/18 01:30 Second unit blood transfusion in progress . Vitals stable,improved after  first unit transfusion . Will continue to moniter
PRBC completed, VSS throughout, no signs or symptoms of transfusion reaction noted. pt has no complaints.
Pt received I unit platelets. No reaction to transfusion noted. Patient is A&Ox3, not in any distress, lying in bed with no complaints. L AC 20 IV patent with no s/s of infiltration/infection. Awaiting PRBC from blood bank. Will continue to monitor.
pt resting comfortably in NAD, has no complaints at this time. VSS. prbc infusing as per md order. cardiac monitor in place.

## 2018-07-24 NOTE — ED CDU PROVIDER SUBSEQUENT DAY NOTE - PROGRESS NOTE DETAILS
Hgb 6.2. will give another unit of pRBC. Platelet increased with 24. will hold 2unit of Platelet Sign out received by SUSAN Dumont. Pt stable, currently receiving unit of PRBC. Sign out received by SUSAN Dumont. Pt stable, currently receiving 3rd unit of PRBC. Pts hmg now 7.4 after 3rd unit of PRBC. Spoke with hem/onc fellow, pt will follow up with Dr. Nelson in 2 days.

## 2018-07-24 NOTE — ED CDU PROVIDER DISPOSITION NOTE - CLINICAL COURSE
Patient was sent to [facility] for blood transfusion, Received 3- units of PRBCs and one unit of platelets tolerating trsnfusion well and without any side effects, was seen by Heme-onc repeat CBC Hb above 7, copies of all blood work given to patient and will fallow up with heme-onc this week

## 2018-07-24 NOTE — CONSULT NOTE ADULT - SUBJECTIVE AND OBJECTIVE BOX
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 She received 1 PRBC and her Hb went from 4.4 to 7.4 , also received 1 single donor platelet and plt increased from 6 to 24      PAST MEDICAL & SURGICAL HISTORY:  Acute monoblastic leukemia in remission  Gastrointestinal bleeding, lower: In january 2018  Breast CA  History of total left hip replacement  Status post right breast lumpectomy      SOCIAL HISTORY:    FAMILY HISTORY:  No pertinent family history in first degree relatives    Allergies    No Known Allergies    Intolerances          Weight (kg): 56.8 (07-23-18 @ 15:34)      HOME MEDICATIONS:  anastrozole 1 mg oral tablet: 1 tab(s) orally once a day (23 Jul 2018 16:38)      Vital Signs Last 24 Hrs  T(C): 36.8 (24 Jul 2018 08:14), Max: 36.8 (24 Jul 2018 07:20)  T(F): 98.3 (24 Jul 2018 08:14), Max: 98.3 (24 Jul 2018 07:20)  HR: 79 (24 Jul 2018 08:14) (76 - 100)  BP: 105/59 (24 Jul 2018 08:14) (85/50 - 118/58)  BP(mean): --  RR: 18 (24 Jul 2018 07:55) (16 - 18)  SpO2: 100% (24 Jul 2018 07:55) (97% - 100%)    PHYSICAL EXAM  General: adult in NAD , alopecia  HEENT: clear oropharynx, anicteric sclera, pink conjunctiva  Neck: supple  CV: normal S1/S2 with no murmur rubs or gallops  Lungs: positive air movement b/l ant lungs,clear to auscultation, no wheezes, no rales  Abdomen: soft non-tender non-distended  Ext: no clubbing cyanosis or edema  Skin: no rashes and no petechiae  Neuro: alert and oriented X 4, no focal deficits    MEDICATIONS  (STANDING):  levoFLOXacin  Tablet 500 milliGRAM(s) Oral every 24 hours    MEDICATIONS  (PRN):      LABS:                          7.4    0.30  )-----------( 25       ( 24 Jul 2018 08:16 )             21.6         Mean Cell Volume : 85.7 fL  Mean Cell Hemoglobin : 29.4 pg  Mean Cell Hemoglobin Concentration : 34.3 g/dL  Auto Neutrophil # : 0.12 K/uL  Auto Lymphocyte # : 0.15 K/uL  Auto Monocyte # : 0.01 K/uL  Auto Eosinophil # : 0.01 K/uL  Auto Basophil # : 0.01 K/uL  Auto Neutrophil % : 40.1 %  Auto Lymphocyte % : 50.0 %  Auto Monocyte % : 3.3 %  Auto Eosinophil % : 3.3 %  Auto Basophil % : 3.3 %      Serial CBC's  07-24 @ 08:16  Hct-21.6 / Hgb-7.4 / Plat-25 / RBC-2.52 / WBC-0.30  Serial CBC's  07-24 @ 01:40  Hct-18.4 / Hgb-6.2 / Plat-24 / RBC-2.15 / WBC-0.32  Serial CBC's  07-23 @ 16:43  Hct-13.1 / Hgb-4.4 / Plat-1 / RBC-1.49 / WBC-0.35      07-23    138  |  99  |  13  ----------------------------<  135<H>  4.2   |  22  |  0.5<L>    Ca    9.0      23 Jul 2018 16:43  Mg     2.1     07-23    TPro  7.7  /  Alb  3.9  /  TBili  0.8  /  DBili  x   /  AST  24  /  ALT  77<H>  /  AlkPhos  275<H>  07-23

## 2018-07-24 NOTE — CONSULT NOTE ADULT - ASSESSMENT
# B cell lymphoblastic leukemia/lymphoma  - s/p induction chemotherapy with HyperCVAD in [place] - completed 7/15  - Receive Neulasta injection 7/16  - On prophylactic bactrim and Valtrex  - Pancytopenia related to chemotherapy : s/p 2PRBC in ED and 1 PLT    Keep hb >8 and Platelet > 77777    #  Stage IA/ [id number] Moderately differentiated  invasive ductal carcinoma of right breast ER/DE positive , HER2/britni negative  - s/p lumpectomy and SLN biopsy  -  on adjuvant endocrine therapy with anastrozole    Follow with Dr Fuchs at [place] for subsequent chemotherapy cycle     Will discuss with attending # B cell lymphoblastic leukemia/lymphoma  - s/p induction chemotherapy with HyperCVAD in [place] - completed 7/15  - Receive Neulasta injection 7/16  - On prophylactic bactrim and Valtrex - refill medications  - Pancytopenia related to chemotherapy : s/p 3PRBC in ED and 1 single donor platelet    Keep hb >7 and Platelet > 51280  - Follow up as OP , and repeat blood work twice weekly    #  Stage IA/ [id number] Moderately differentiated  invasive ductal carcinoma of right breast ER/NH positive , HER2/britni negative  - s/p lumpectomy and SLN biopsy  -  on adjuvant endocrine therapy with anastrozole    Follow with Dr Fuchs at [place] for next chemotherapy cycle     Will discuss with attending

## 2018-07-26 ENCOUNTER — APPOINTMENT (OUTPATIENT)
Dept: HEMATOLOGY ONCOLOGY | Facility: CLINIC | Age: 68
End: 2018-07-26

## 2018-07-26 ENCOUNTER — LABORATORY RESULT (OUTPATIENT)
Age: 68
End: 2018-07-26

## 2018-07-26 PROBLEM — C93.01: Chronic | Status: ACTIVE | Noted: 2018-07-23

## 2018-07-30 ENCOUNTER — LABORATORY RESULT (OUTPATIENT)
Age: 68
End: 2018-07-30

## 2018-07-30 ENCOUNTER — APPOINTMENT (OUTPATIENT)
Dept: HEMATOLOGY ONCOLOGY | Facility: CLINIC | Age: 68
End: 2018-07-30

## 2018-07-31 PROBLEM — C91.00 ALL (ACUTE LYMPHOBLASTIC LEUKEMIA): Status: ACTIVE | Noted: 2018-07-31

## 2018-08-02 ENCOUNTER — LABORATORY RESULT (OUTPATIENT)
Age: 68
End: 2018-08-02

## 2018-08-02 ENCOUNTER — APPOINTMENT (OUTPATIENT)
Dept: HEMATOLOGY ONCOLOGY | Facility: CLINIC | Age: 68
End: 2018-08-02

## 2018-08-02 ENCOUNTER — APPOINTMENT (OUTPATIENT)
Dept: INFUSION THERAPY | Facility: CLINIC | Age: 68
End: 2018-08-02

## 2018-08-02 LAB
ABO + RH PNL BLD: NORMAL
BLD GP AB SCN SERPL QL: ABNORMAL
HCT VFR BLD CALC: 29.8 %
HGB BLD-MCNC: 9.6 G/DL
MCHC RBC-ENTMCNC: 29.3 PG
MCHC RBC-ENTMCNC: 32.2 G/DL
MCV RBC AUTO: 90.9 FL
PLATELET # BLD AUTO: 241 K/UL
PMV BLD: 9.1 FL
RBC # BLD: 3.28 M/UL
RBC # FLD: 16.8 %
WBC # FLD AUTO: 2.51 K/UL

## 2018-08-06 ENCOUNTER — APPOINTMENT (OUTPATIENT)
Dept: HEMATOLOGY ONCOLOGY | Facility: CLINIC | Age: 68
End: 2018-08-06

## 2018-08-06 DIAGNOSIS — C91.00 ACUTE LYMPHOBLASTIC LEUKEMIA NOT HAVING ACHIEVED REMISSION: ICD-10-CM

## 2018-08-09 ENCOUNTER — APPOINTMENT (OUTPATIENT)
Dept: HEMATOLOGY ONCOLOGY | Facility: CLINIC | Age: 68
End: 2018-08-09

## 2018-08-11 LAB
HCT VFR BLD CALC: 22 %
HCT VFR BLD CALC: 22.8 %
HGB BLD-MCNC: 7.4 G/DL
HGB BLD-MCNC: 7.5 G/DL
MCHC RBC-ENTMCNC: 29.4 PG
MCHC RBC-ENTMCNC: 29.7 PG
MCHC RBC-ENTMCNC: 32.9 G/DL
MCHC RBC-ENTMCNC: 33.6 G/DL
MCV RBC AUTO: 88.4 FL
MCV RBC AUTO: 89.4 FL
PLATELET # BLD AUTO: 238 K/UL
PLATELET # BLD AUTO: 28 K/UL
PMV BLD: 12.7 FL
PMV BLD: 9.8 FL
RBC # BLD: 2.49 M/UL
RBC # BLD: 2.55 M/UL
RBC # FLD: 15.2 %
RBC # FLD: 15.3 %
WBC # FLD AUTO: 0.97 K/UL
WBC # FLD AUTO: 2.32 K/UL

## 2018-08-15 ENCOUNTER — APPOINTMENT (OUTPATIENT)
Dept: INFUSION THERAPY | Facility: CLINIC | Age: 68
End: 2018-08-15

## 2018-08-15 RX ORDER — PEGFILGRASTIM-CBQV 6 MG/.6ML
6 INJECTION, SOLUTION SUBCUTANEOUS ONCE
Qty: 0 | Refills: 0 | Status: COMPLETED | OUTPATIENT
Start: 2018-08-15 | End: 2018-08-15

## 2018-08-15 RX ADMIN — PEGFILGRASTIM-CBQV 6 MILLIGRAM(S): 6 INJECTION, SOLUTION SUBCUTANEOUS at 13:07

## 2018-08-18 NOTE — ED PROVIDER NOTE - PHYSICAL EXAMINATION
no fever/no loss of consciousness/no deformity
VITAL SIGNS: I have reviewed nursing notes and confirm.  CONSTITUTIONAL: Well-developed; well-nourished; in no acute distress. pt comfortable.  SKIN: skin exam is warm and dry, no acute rash.   HEAD: Normocephalic; atraumatic.  EYES:  EOM intact; conjunctiva and sclera clear.  ENT: No nasal discharge; airway clear. moist oral mucosa;   NECK: Supple; non tender.  CARD: S1, S2 normal; no murmurs, gallops, or rubs. Regular rate and rhythm. posterior tibial and radial pulses 2+  RESP: No wheezes, rales or rhonchi. cta b/l. no use of accessory muscles. no retractions  ABD: Normal bowel sounds; soft; non-distended; non-tender; no rebound.  EXT: Normal ROM. No  cyanosis or edema.  BACK: No cva tenderness  LYMPH: No acute cervical adenopathy.  NEURO: Alert, oriented, grossly unremarkable.    PSYCH: Cooperative, appropriate.

## 2018-08-23 ENCOUNTER — APPOINTMENT (OUTPATIENT)
Dept: HEMATOLOGY ONCOLOGY | Facility: CLINIC | Age: 68
End: 2018-08-23

## 2018-08-27 ENCOUNTER — APPOINTMENT (OUTPATIENT)
Dept: HEMATOLOGY ONCOLOGY | Facility: CLINIC | Age: 68
End: 2018-08-27

## 2018-08-30 ENCOUNTER — APPOINTMENT (OUTPATIENT)
Dept: HEMATOLOGY ONCOLOGY | Facility: CLINIC | Age: 68
End: 2018-08-30

## 2018-09-04 ENCOUNTER — APPOINTMENT (OUTPATIENT)
Dept: HEMATOLOGY ONCOLOGY | Facility: CLINIC | Age: 68
End: 2018-09-04

## 2018-09-06 ENCOUNTER — APPOINTMENT (OUTPATIENT)
Dept: HEMATOLOGY ONCOLOGY | Facility: CLINIC | Age: 68
End: 2018-09-06

## 2018-09-06 ENCOUNTER — APPOINTMENT (OUTPATIENT)
Dept: INFUSION THERAPY | Facility: CLINIC | Age: 68
End: 2018-09-06

## 2018-09-06 ENCOUNTER — LABORATORY RESULT (OUTPATIENT)
Age: 68
End: 2018-09-06

## 2018-09-06 VITALS — HEART RATE: 61 BPM

## 2018-09-06 VITALS
BODY MASS INDEX: 21.79 KG/M2 | RESPIRATION RATE: 16 BRPM | HEIGHT: 63 IN | HEART RATE: 21 BPM | WEIGHT: 123 LBS | SYSTOLIC BLOOD PRESSURE: 107 MMHG | TEMPERATURE: 97.6 F | DIASTOLIC BLOOD PRESSURE: 66 MMHG

## 2018-09-14 ENCOUNTER — APPOINTMENT (OUTPATIENT)
Dept: INFUSION THERAPY | Facility: CLINIC | Age: 68
End: 2018-09-14

## 2018-09-14 RX ORDER — ACETAMINOPHEN 500 MG
650 TABLET ORAL ONCE
Qty: 0 | Refills: 0 | Status: COMPLETED | OUTPATIENT
Start: 2018-09-14 | End: 2018-09-14

## 2018-09-14 RX ORDER — DIPHENHYDRAMINE HCL 50 MG
25 CAPSULE ORAL ONCE
Qty: 0 | Refills: 0 | Status: COMPLETED | OUTPATIENT
Start: 2018-09-14 | End: 2018-09-14

## 2018-09-14 RX ORDER — INOTUZUMAB OZOGAMICIN 0.25 MG/ML
1.25 INJECTION, POWDER, LYOPHILIZED, FOR SOLUTION INTRAVENOUS ONCE
Qty: 0 | Refills: 0 | Status: COMPLETED | OUTPATIENT
Start: 2018-09-14 | End: 2018-09-14

## 2018-09-14 RX ORDER — HYDROCORTISONE 20 MG
100 TABLET ORAL ONCE
Qty: 0 | Refills: 0 | Status: COMPLETED | OUTPATIENT
Start: 2018-09-14 | End: 2018-09-14

## 2018-09-14 RX ADMIN — Medication 104 MILLIGRAM(S): at 12:56

## 2018-09-14 RX ADMIN — Medication 650 MILLIGRAM(S): at 12:57

## 2018-09-14 RX ADMIN — INOTUZUMAB OZOGAMICIN 55 MILLIGRAM(S): 0.25 INJECTION, POWDER, LYOPHILIZED, FOR SOLUTION INTRAVENOUS at 13:34

## 2018-09-14 RX ADMIN — Medication 151.5 MILLIGRAM(S): at 12:56

## 2018-09-14 RX ADMIN — Medication 650 MILLIGRAM(S): at 12:56

## 2018-09-17 ENCOUNTER — LABORATORY RESULT (OUTPATIENT)
Age: 68
End: 2018-09-17

## 2018-09-17 ENCOUNTER — APPOINTMENT (OUTPATIENT)
Dept: INFUSION THERAPY | Facility: CLINIC | Age: 68
End: 2018-09-17

## 2018-09-21 ENCOUNTER — LABORATORY RESULT (OUTPATIENT)
Age: 68
End: 2018-09-21

## 2018-09-21 ENCOUNTER — APPOINTMENT (OUTPATIENT)
Dept: INFUSION THERAPY | Facility: CLINIC | Age: 68
End: 2018-09-21

## 2018-09-21 RX ORDER — HYDROCORTISONE 20 MG
100 TABLET ORAL ONCE
Qty: 0 | Refills: 0 | Status: COMPLETED | OUTPATIENT
Start: 2018-09-21 | End: 2018-09-21

## 2018-09-21 RX ORDER — ACETAMINOPHEN 500 MG
650 TABLET ORAL ONCE
Qty: 0 | Refills: 0 | Status: COMPLETED | OUTPATIENT
Start: 2018-09-21 | End: 2018-09-21

## 2018-09-21 RX ORDER — INOTUZUMAB OZOGAMICIN 0.25 MG/ML
0.78 INJECTION, POWDER, LYOPHILIZED, FOR SOLUTION INTRAVENOUS ONCE
Qty: 0 | Refills: 0 | Status: COMPLETED | OUTPATIENT
Start: 2018-09-21 | End: 2018-09-21

## 2018-09-21 RX ORDER — DIPHENHYDRAMINE HCL 50 MG
25 CAPSULE ORAL ONCE
Qty: 0 | Refills: 0 | Status: COMPLETED | OUTPATIENT
Start: 2018-09-21 | End: 2018-09-21

## 2018-09-21 RX ADMIN — Medication 650 MILLIGRAM(S): at 11:30

## 2018-09-21 RX ADMIN — Medication 151.5 MILLIGRAM(S): at 11:31

## 2018-09-21 RX ADMIN — INOTUZUMAB OZOGAMICIN 53.12 MILLIGRAM(S): 0.25 INJECTION, POWDER, LYOPHILIZED, FOR SOLUTION INTRAVENOUS at 11:49

## 2018-09-21 RX ADMIN — Medication 104 MILLIGRAM(S): at 11:30

## 2018-09-21 RX ADMIN — Medication 650 MILLIGRAM(S): at 11:31

## 2018-09-24 ENCOUNTER — LABORATORY RESULT (OUTPATIENT)
Age: 68
End: 2018-09-24

## 2018-09-24 ENCOUNTER — APPOINTMENT (OUTPATIENT)
Dept: INFUSION THERAPY | Facility: CLINIC | Age: 68
End: 2018-09-24

## 2018-09-27 ENCOUNTER — LABORATORY RESULT (OUTPATIENT)
Age: 68
End: 2018-09-27

## 2018-09-27 ENCOUNTER — APPOINTMENT (OUTPATIENT)
Dept: HEMATOLOGY ONCOLOGY | Facility: CLINIC | Age: 68
End: 2018-09-27

## 2018-09-27 VITALS
SYSTOLIC BLOOD PRESSURE: 129 MMHG | WEIGHT: 124 LBS | BODY MASS INDEX: 21.97 KG/M2 | TEMPERATURE: 96.4 F | RESPIRATION RATE: 16 BRPM | HEIGHT: 63 IN | HEART RATE: 60 BPM | DIASTOLIC BLOOD PRESSURE: 78 MMHG

## 2018-09-27 LAB
ALBUMIN SERPL ELPH-MCNC: 4.1 G/DL
ALBUMIN SERPL ELPH-MCNC: 4.2 G/DL
ALP BLD-CCNC: 216 U/L
ALP BLD-CCNC: 248 U/L
ALT SERPL-CCNC: 34 U/L
ALT SERPL-CCNC: 64 U/L
ANION GAP SERPL CALC-SCNC: 15 MMOL/L
ANION GAP SERPL CALC-SCNC: 15 MMOL/L
AST SERPL-CCNC: 43 U/L
AST SERPL-CCNC: 63 U/L
BILIRUB DIRECT SERPL-MCNC: 0.2 MG/DL
BILIRUB INDIRECT SERPL-MCNC: 0.5 MG/DL
BILIRUB SERPL-MCNC: 0.7 MG/DL
BILIRUB SERPL-MCNC: 0.8 MG/DL
BUN SERPL-MCNC: 8 MG/DL
BUN SERPL-MCNC: 8 MG/DL
CALCIUM SERPL-MCNC: 10.2 MG/DL
CALCIUM SERPL-MCNC: 9.5 MG/DL
CHLORIDE SERPL-SCNC: 101 MMOL/L
CHLORIDE SERPL-SCNC: 99 MMOL/L
CO2 SERPL-SCNC: 23 MMOL/L
CO2 SERPL-SCNC: 25 MMOL/L
CREAT SERPL-MCNC: 0.5 MG/DL
CREAT SERPL-MCNC: 0.5 MG/DL
GLUCOSE SERPL-MCNC: 102 MG/DL
GLUCOSE SERPL-MCNC: 129 MG/DL
HCT VFR BLD CALC: 26.6 %
HCT VFR BLD CALC: 26.7 %
HCT VFR BLD CALC: 27.3 %
HGB BLD-MCNC: 8.4 G/DL
HGB BLD-MCNC: 8.5 G/DL
HGB BLD-MCNC: 8.6 G/DL
MCHC RBC-ENTMCNC: 28.6 PG
MCHC RBC-ENTMCNC: 28.9 PG
MCHC RBC-ENTMCNC: 28.9 PG
MCHC RBC-ENTMCNC: 31.5 G/DL
MCHC RBC-ENTMCNC: 31.5 G/DL
MCHC RBC-ENTMCNC: 32 G/DL
MCV RBC AUTO: 90.5 FL
MCV RBC AUTO: 90.8 FL
MCV RBC AUTO: 91.6 FL
PLATELET # BLD AUTO: 78 K/UL
PLATELET # BLD AUTO: 90 K/UL
PLATELET # BLD AUTO: 99 K/UL
PMV BLD: 10.1 FL
PMV BLD: 10.4 FL
PMV BLD: 9.6 FL
POTASSIUM SERPL-SCNC: 4 MMOL/L
POTASSIUM SERPL-SCNC: 4 MMOL/L
PROT SERPL-MCNC: 7.9 G/DL
PROT SERPL-MCNC: 8.2 G/DL
RBC # BLD: 2.94 M/UL
RBC # BLD: 2.94 M/UL
RBC # BLD: 2.98 M/UL
RBC # FLD: 17.9 %
RBC # FLD: 18.1 %
RBC # FLD: 18.4 %
SODIUM SERPL-SCNC: 139 MMOL/L
SODIUM SERPL-SCNC: 139 MMOL/L
WBC # FLD AUTO: 4.06 K/UL
WBC # FLD AUTO: 4.15 K/UL
WBC # FLD AUTO: 4.36 K/UL

## 2018-09-28 ENCOUNTER — APPOINTMENT (OUTPATIENT)
Dept: INFUSION THERAPY | Facility: CLINIC | Age: 68
End: 2018-09-28

## 2018-09-28 RX ORDER — DIPHENHYDRAMINE HCL 50 MG
25 CAPSULE ORAL ONCE
Qty: 0 | Refills: 0 | Status: COMPLETED | OUTPATIENT
Start: 2018-09-28 | End: 2018-09-28

## 2018-09-28 RX ORDER — HYDROCORTISONE 20 MG
100 TABLET ORAL ONCE
Qty: 0 | Refills: 0 | Status: COMPLETED | OUTPATIENT
Start: 2018-09-28 | End: 2018-09-28

## 2018-09-28 RX ORDER — INOTUZUMAB OZOGAMICIN 0.25 MG/ML
0.78 INJECTION, POWDER, LYOPHILIZED, FOR SOLUTION INTRAVENOUS ONCE
Qty: 0 | Refills: 0 | Status: COMPLETED | OUTPATIENT
Start: 2018-09-28 | End: 2018-09-28

## 2018-09-28 RX ORDER — ACETAMINOPHEN 500 MG
650 TABLET ORAL ONCE
Qty: 0 | Refills: 0 | Status: COMPLETED | OUTPATIENT
Start: 2018-09-28 | End: 2018-09-28

## 2018-09-28 RX ADMIN — INOTUZUMAB OZOGAMICIN 53.12 MILLIGRAM(S): 0.25 INJECTION, POWDER, LYOPHILIZED, FOR SOLUTION INTRAVENOUS at 11:37

## 2018-09-28 RX ADMIN — Medication 151.5 MILLIGRAM(S): at 10:39

## 2018-09-28 RX ADMIN — Medication 650 MILLIGRAM(S): at 10:39

## 2018-09-28 RX ADMIN — Medication 104 MILLIGRAM(S): at 10:39

## 2018-09-28 RX ADMIN — Medication 650 MILLIGRAM(S): at 10:38

## 2018-10-01 ENCOUNTER — LABORATORY RESULT (OUTPATIENT)
Age: 68
End: 2018-10-01

## 2018-10-01 ENCOUNTER — APPOINTMENT (OUTPATIENT)
Dept: INFUSION THERAPY | Facility: CLINIC | Age: 68
End: 2018-10-01

## 2018-10-05 ENCOUNTER — APPOINTMENT (OUTPATIENT)
Dept: INFUSION THERAPY | Facility: CLINIC | Age: 68
End: 2018-10-05

## 2018-10-05 ENCOUNTER — LABORATORY RESULT (OUTPATIENT)
Age: 68
End: 2018-10-05

## 2018-10-09 ENCOUNTER — LABORATORY RESULT (OUTPATIENT)
Age: 68
End: 2018-10-09

## 2018-10-09 ENCOUNTER — APPOINTMENT (OUTPATIENT)
Dept: INFUSION THERAPY | Facility: CLINIC | Age: 68
End: 2018-10-09

## 2018-10-12 ENCOUNTER — APPOINTMENT (OUTPATIENT)
Dept: INFUSION THERAPY | Facility: CLINIC | Age: 68
End: 2018-10-12

## 2018-10-12 ENCOUNTER — APPOINTMENT (OUTPATIENT)
Dept: HEMATOLOGY ONCOLOGY | Facility: CLINIC | Age: 68
End: 2018-10-12

## 2018-10-12 ENCOUNTER — LABORATORY RESULT (OUTPATIENT)
Age: 68
End: 2018-10-12

## 2018-10-12 ENCOUNTER — EMERGENCY (EMERGENCY)
Facility: HOSPITAL | Age: 68
LOS: 0 days | Discharge: HOME | End: 2018-10-12
Attending: EMERGENCY MEDICINE | Admitting: EMERGENCY MEDICINE

## 2018-10-12 VITALS
OXYGEN SATURATION: 100 % | SYSTOLIC BLOOD PRESSURE: 108 MMHG | RESPIRATION RATE: 18 BRPM | TEMPERATURE: 98 F | DIASTOLIC BLOOD PRESSURE: 56 MMHG | HEART RATE: 81 BPM

## 2018-10-12 VITALS
TEMPERATURE: 97.7 F | DIASTOLIC BLOOD PRESSURE: 68 MMHG | SYSTOLIC BLOOD PRESSURE: 115 MMHG | HEIGHT: 63 IN | RESPIRATION RATE: 14 BRPM | BODY MASS INDEX: 21.97 KG/M2 | HEART RATE: 82 BPM | WEIGHT: 124 LBS

## 2018-10-12 VITALS
OXYGEN SATURATION: 100 % | RESPIRATION RATE: 17 BRPM | HEART RATE: 80 BPM | SYSTOLIC BLOOD PRESSURE: 122 MMHG | TEMPERATURE: 98 F | DIASTOLIC BLOOD PRESSURE: 61 MMHG

## 2018-10-12 DIAGNOSIS — D64.9 ANEMIA, UNSPECIFIED: ICD-10-CM

## 2018-10-12 DIAGNOSIS — Z96.642 PRESENCE OF LEFT ARTIFICIAL HIP JOINT: Chronic | ICD-10-CM

## 2018-10-12 DIAGNOSIS — C50.919 MALIGNANT NEOPLASM OF UNSPECIFIED SITE OF UNSPECIFIED FEMALE BREAST: ICD-10-CM

## 2018-10-12 DIAGNOSIS — C93.00 ACUTE MONOBLASTIC/MONOCYTIC LEUKEMIA, NOT HAVING ACHIEVED REMISSION: ICD-10-CM

## 2018-10-12 DIAGNOSIS — Z98.890 OTHER SPECIFIED POSTPROCEDURAL STATES: Chronic | ICD-10-CM

## 2018-10-12 LAB
ANION GAP SERPL CALC-SCNC: 13 MMOL/L — SIGNIFICANT CHANGE UP (ref 7–14)
APTT BLD: 33.3 SEC — SIGNIFICANT CHANGE UP (ref 27–39.2)
BUN SERPL-MCNC: 9 MG/DL — LOW (ref 10–20)
CALCIUM SERPL-MCNC: 9.2 MG/DL — SIGNIFICANT CHANGE UP (ref 8.5–10.1)
CHLORIDE SERPL-SCNC: 101 MMOL/L — SIGNIFICANT CHANGE UP (ref 98–110)
CO2 SERPL-SCNC: 23 MMOL/L — SIGNIFICANT CHANGE UP (ref 17–32)
CREAT SERPL-MCNC: 0.5 MG/DL — LOW (ref 0.7–1.5)
GLUCOSE SERPL-MCNC: 94 MG/DL — SIGNIFICANT CHANGE UP (ref 70–99)
HCT VFR BLD CALC: 20.5 % — LOW (ref 37–47)
HGB BLD-MCNC: 6.4 G/DL — CRITICAL LOW (ref 12–16)
INR BLD: 1.18 RATIO — SIGNIFICANT CHANGE UP (ref 0.65–1.3)
MCHC RBC-ENTMCNC: 28.1 PG — SIGNIFICANT CHANGE UP (ref 27–31)
MCHC RBC-ENTMCNC: 31.2 G/DL — LOW (ref 32–37)
MCV RBC AUTO: 89.9 FL — SIGNIFICANT CHANGE UP (ref 81–99)
NRBC # BLD: 10 /100 WBCS — HIGH (ref 0–0)
PLATELET # BLD AUTO: 43 K/UL — LOW (ref 130–400)
POTASSIUM SERPL-MCNC: 4.1 MMOL/L — SIGNIFICANT CHANGE UP (ref 3.5–5)
POTASSIUM SERPL-SCNC: 4.1 MMOL/L — SIGNIFICANT CHANGE UP (ref 3.5–5)
PROTHROM AB SERPL-ACNC: 13.6 SEC — HIGH (ref 9.95–12.87)
RBC # BLD: 2.28 M/UL — LOW (ref 4.2–5.4)
RBC # FLD: 18.6 % — HIGH (ref 11.5–14.5)
SODIUM SERPL-SCNC: 137 MMOL/L — SIGNIFICANT CHANGE UP (ref 135–146)
TYPE + AB SCN PNL BLD: SIGNIFICANT CHANGE UP
WBC # BLD: 3.98 K/UL — LOW (ref 4.8–10.8)
WBC # FLD AUTO: 3.98 K/UL — LOW (ref 4.8–10.8)

## 2018-10-12 RX ORDER — VALACYCLOVIR 500 MG/1
500 TABLET, FILM COATED ORAL DAILY
Qty: 30 | Refills: 1 | Status: ACTIVE | COMMUNITY
Start: 2018-09-17 | End: 1900-01-01

## 2018-10-12 RX ORDER — SULFAMETHOXAZOLE AND TRIMETHOPRIM 800; 160 MG/1; MG/1
800-160 TABLET ORAL
Qty: 30 | Refills: 1 | Status: ACTIVE | COMMUNITY
Start: 2018-07-31 | End: 1900-01-01

## 2018-10-12 NOTE — ED CDU PROVIDER INITIAL DAY NOTE - OBJECTIVE STATEMENT
68yo F with PMHx leukemia, breast CA, anemia, h/o GIB, sent by heme/onc for blood transfusion after finding Hb of 6.4. 66yo F with PMHx leukemia, breast CA, anemia, h/o GIB, sent by heme/onc for blood transfusion after finding Hb of 6.4 on routine bloodwork. Patient is asymptomatic and has no acute complaints. Denies fever, chills, headache, lightheadedness, CP, SOB, cough, hemoptysis, palpitations, nausea, vomiting, diarrhea, abd pain, bloody stools, melena, dysuria, hematuria, leg swelling.

## 2018-10-12 NOTE — ED CDU PROVIDER INITIAL DAY NOTE - PHYSICAL EXAMINATION
Vital signs reviewed  GENERAL: Patient nontoxic appearing, NAD  HEAD: NCAT  EYES: ANicteric. Conjunctiva clear  ENT: MMM  NECK: Supple, non tender  RESPIRATORY: Normal respiratory effort. CTA B/L. No wheezing, rales, rhonchi  CARDIOVASCULAR: Regular rate and rhythm. Normal S1/S2. No murmurs, rubs or gallops  ABDOMEN: Soft. Nondistended. Nontender. No guarding or rebound  MUSCULOSKELETAL/EXTREMITIES: Brisk cap refill. 2+ radial pulses  SKIN:  Warm and dry. No acute rash  NEURO: AAOx3. No gross FND.  PSYCHIATRIC: Cooperative. Affect appropriate

## 2018-10-12 NOTE — ED ADULT NURSE NOTE - PMH
Acute monoblastic leukemia in remission    Anemia    Breast CA    Gastrointestinal bleeding, lower  In january 2018

## 2018-10-12 NOTE — ED ADULT NURSE NOTE - NSIMPLEMENTINTERV_GEN_ALL_ED
Implemented All Universal Safety Interventions:  [place] to call system. Call bell, personal items and telephone within reach. Instruct patient to call for assistance. Room bathroom lighting operational. Non-slip footwear when patient is off stretcher. Physically safe environment: no spills, clutter or unnecessary equipment. Stretcher in lowest position, wheels locked, appropriate side rails in place.

## 2018-10-12 NOTE — ED CDU PROVIDER INITIAL DAY NOTE - NS ED ROS FT
Constitutional: No fever  Eyes:  No visual changes  ENMT:  No sore throat  Cardiac:  No chest pain  Respiratory:  No cough, SOB  GI:  No nausea, vomiting, diarrhea, abdominal pain  :  No dysuria  MS:  No back pain.  Neuro:  No headache or lightheadedness  Skin:  No skin rash  Endocrine: No history of thyroid disease or diabetes

## 2018-10-12 NOTE — ED CDU PROVIDER INITIAL DAY NOTE - PROGRESS NOTE DETAILS
1st unit of blood started. patient remains comfortable and asymptomatic blood transfusion completed. will observe for reaction. if no reaction, patient can be dicharged. patient is feeling well and will discharge

## 2018-10-12 NOTE — ED PROVIDER NOTE - ATTENDING CONTRIBUTION TO CARE
66yo F pmhx of BCL on chemo (last dose approx 1 week ago via left PICC) presenting from [facility] for low h/h. Asymptomatic. Denies symptoms of bleeding. Has had prior episode of pancytopenia requiring plt and rbc tx. Denies fevers, chills, chest pain, sob.    VITAL SIGNS: noted  CONSTITUTIONAL: Well-developed; well-nourished; in no acute distress  HEAD: Normocephalic; atraumatic  EYES: PERRL, EOM intact; conjunctiva and sclera clear  ENT: No nasal discharge; airway clear. MMM  NECK: Supple; non tender. No anterior cervical lymphadenopathy noted  CARD: S1, S2 normal; no murmurs, gallops, or rubs. Regular rate and rhythm  RESP: CTAB/L, no wheezes, rales or rhonchi  ABD: Normal bowel sounds; soft; non-distended; non-tender; no hepatosplenomegaly. No CVA tenderness  EXT: Normal ROM. No calf tenderness or edema. Distal pulses intact  NEURO: Alert, oriented. Grossly unremarkable. No focal deficits  SKIN: Skin exam is warm and dry, no acute rash  MS: No midline spinal tenderness    - labs tx prn obs anticipate dc after obs.

## 2018-10-12 NOTE — ED ADULT NURSE NOTE - OBJECTIVE STATEMENT
Pt sent from clinic for Hgb of 6.6 as per pt. Pt currently asymptomatic. AAO x 4. Denies SOB, CP, h/a, dizziness. Vitals stable. Pt has hx of blood transfusions.

## 2018-10-12 NOTE — ED CDU PROVIDER DISPOSITION NOTE - ATTENDING CONTRIBUTION TO CARE
68yo F with PMHx leukemia, breast CA, anemia, h/o GIB, sent by heme/onc for blood transfusion after finding Hb of 6.4 on routine bloodwork. 1 unit pRBC given. no reaction. feels well. d/c and f/u with PMD

## 2018-10-12 NOTE — ED CDU PROVIDER INITIAL DAY NOTE - MEDICAL DECISION MAKING DETAILS
66yo woman h/o leukemia, breast CA was placed in CDU for transfusion after outpt Hb was low. Pt has no complaints, feels at baseline. Denies dark stools/bloody stools. VS, exam as noted, pt nontoxic appearing and comfortable, lungs CTA, CVS1S2 RRR abd soft. ED Hb 6.4. Will transfuse, likely d/c.

## 2018-10-12 NOTE — ED PROVIDER NOTE - MEDICAL DECISION MAKING DETAILS
anemia requiring transfusion. Will give 1 unit per heme and dc home. Pt voiced understanding and agrees with plan.

## 2018-10-12 NOTE — ED PROVIDER NOTE - OBJECTIVE STATEMENT
67F pmhx of BCL with hx of pancytopnia requiring transmission sent for low h/h. No complaints at present. Found on routine lab work.

## 2018-10-12 NOTE — ED PROVIDER NOTE - NS ED ROS FT
No fever/chills, no change in vision, no throat pain, no chest pain, no abdominal pain, no nausea/vomiting,  no dysuria, no joint pain, no rashes, no focal numbness or weakness, no known mental health issues, no latex allergy

## 2018-10-15 ENCOUNTER — LABORATORY RESULT (OUTPATIENT)
Age: 68
End: 2018-10-15

## 2018-10-15 ENCOUNTER — APPOINTMENT (OUTPATIENT)
Dept: INFUSION THERAPY | Facility: CLINIC | Age: 68
End: 2018-10-15

## 2018-10-15 ENCOUNTER — OTHER (OUTPATIENT)
Age: 68
End: 2018-10-15

## 2018-10-15 ENCOUNTER — APPOINTMENT (OUTPATIENT)
Dept: HEMATOLOGY ONCOLOGY | Facility: CLINIC | Age: 68
End: 2018-10-15

## 2018-10-15 PROBLEM — D64.9 ANEMIA, UNSPECIFIED: Chronic | Status: ACTIVE | Noted: 2018-10-12

## 2018-10-16 ENCOUNTER — APPOINTMENT (OUTPATIENT)
Dept: INFUSION THERAPY | Facility: CLINIC | Age: 68
End: 2018-10-16

## 2018-10-16 RX ORDER — DIPHENHYDRAMINE HCL 50 MG
50 CAPSULE ORAL ONCE
Qty: 0 | Refills: 0 | Status: COMPLETED | OUTPATIENT
Start: 2018-10-16 | End: 2018-10-16

## 2018-10-16 RX ORDER — ACETAMINOPHEN 500 MG
650 TABLET ORAL ONCE
Qty: 0 | Refills: 0 | Status: COMPLETED | OUTPATIENT
Start: 2018-10-16 | End: 2018-10-16

## 2018-10-16 RX ADMIN — Medication 50 MILLIGRAM(S): at 15:12

## 2018-10-16 RX ADMIN — Medication 650 MILLIGRAM(S): at 15:13

## 2018-10-17 ENCOUNTER — APPOINTMENT (OUTPATIENT)
Dept: INFUSION THERAPY | Facility: CLINIC | Age: 68
End: 2018-10-17

## 2018-10-17 ENCOUNTER — OUTPATIENT (OUTPATIENT)
Dept: OUTPATIENT SERVICES | Facility: HOSPITAL | Age: 68
LOS: 1 days | Discharge: HOME | End: 2018-10-17

## 2018-10-17 DIAGNOSIS — D69.6 THROMBOCYTOPENIA, UNSPECIFIED: ICD-10-CM

## 2018-10-17 DIAGNOSIS — Z96.642 PRESENCE OF LEFT ARTIFICIAL HIP JOINT: Chronic | ICD-10-CM

## 2018-10-17 DIAGNOSIS — Z98.890 OTHER SPECIFIED POSTPROCEDURAL STATES: Chronic | ICD-10-CM

## 2018-10-17 DIAGNOSIS — Z51.11 ENCOUNTER FOR ANTINEOPLASTIC CHEMOTHERAPY: ICD-10-CM

## 2018-10-17 DIAGNOSIS — C91.00 ACUTE LYMPHOBLASTIC LEUKEMIA NOT HAVING ACHIEVED REMISSION: ICD-10-CM

## 2018-10-17 RX ORDER — ACETAMINOPHEN 500 MG
650 TABLET ORAL ONCE
Qty: 0 | Refills: 0 | Status: COMPLETED | OUTPATIENT
Start: 2018-10-17 | End: 2018-10-17

## 2018-10-17 RX ORDER — DIPHENHYDRAMINE HCL 50 MG
50 CAPSULE ORAL ONCE
Qty: 0 | Refills: 0 | Status: COMPLETED | OUTPATIENT
Start: 2018-10-17 | End: 2018-10-17

## 2018-10-17 RX ADMIN — Medication 650 MILLIGRAM(S): at 08:55

## 2018-10-17 RX ADMIN — Medication 50 MILLIGRAM(S): at 08:55

## 2018-10-18 NOTE — PHYSICAL EXAM
[Restricted in physically strenuous activity but ambulatory and able to carry out work of a light or sedentary nature] : Status 1- Restricted in physically strenuous activity but ambulatory and able to carry out work of a light or sedentary nature, e.g., light house work, office work [Normal] : no peripheral adenopathy appreciated [de-identified] : small hematoma on mucosal surface if lower lip [de-identified] : Status post right breast lumpectomy and right axillary SLN biopsy.  .  Left breast lumpectomy.  There is no palpable axillary lymphadenopathy bilaterally. [de-identified] : there are small ecchymosis on hands [de-identified] : There are scattered ecchymoses on limbs.

## 2018-10-18 NOTE — ASSESSMENT
[FreeTextEntry1] : 1. Ph- B cell lymphoblastic leukemia, s/p 3 cycles of HyperCVAD with MRD s/p 1 cycle of inotuzumab\par 2. Stage IA, [id number], ER/NE postive, Her 2 negative invasive moderate differentiated ductal carcinoma of the right breast, s/p lumpectomy and axillary SLN biopsy.\par \par PLAN:  \par -- Repeat CBC reviewed. PLT is 35  Hb 6.2. She will need blood transfusion. Will send her to ER to get 1 unit of PRBC\par -- Will recheck her CBC on Monday. For now will postpone her second cycle of inotuzumab until recovery of her counts. check LFT's \par -- Continue prophylactic Bactrim DS three times per week and Valtrex 500 mg daily.\par -- She should call if she develops fever or other problems.. \par -- Continue Anastrozole, calcium and vitamin D supplement. \par --RTC in one week to reassess if the Inotuzumab can be started \par \par patient seen and examined with Dr. Covarrubias\par

## 2018-10-18 NOTE — HISTORY OF PRESENT ILLNESS
[Disease: _____________________] : Disease: [unfilled] [T: ___] : T[unfilled] [N: ___] : N[unfilled] [M: ___] : M[unfilled] [AJCC Stage: ____] : AJCC Stage: [unfilled] [de-identified] : The patient was admitted to [facility] in 5/2018 for fatigue, SOB and anemia. She was found to have leukopenia, glenys positive AIHA, lymphadenopathy. 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 She was subsequently transferred to [place] and received induction chemotherapy with HyperCVAD. She completed 1st cycle chemo and discharged home on 7/15. She is here today to check CBC and have Neulasta injection.\par \par She reports feeling fatigue. She does not have fever, N/V/D.\par \par On 9/6/2018:\reina Brewster has been treated by Dr. Fuchs at [place]. She received 3 cycles of HyperCVAD and achieved good clinical response. However, her repeat bone marrow still showed MRD. Dr. Fuchs recommended her to have Inotuzumab iv on day 1, 8 and 15, every 4 weeks. He explained benefit and side effects to the patient. She agreed to take treatment. Since she livers on [place], she wants to come back for treatment. \par She was just discharged from hospital on this Tuesday after being treated for cellulitis of her left arm and pain in her right low leg. She is feeling better. She does not have fever. She has a picc line on her left arm.\par \par 09/27/18:\reina Brewster is here today for re-valuation and treatment. She has received Inotuzumab weekly x 2 and tolerated well. She di not have any fever, rash, n/v/d or abdominal pain. Her liver enzymes were mild elevated. Her PLT counts went down to 75 after 1st treatment. She complains pain in her leg and lumps behind her thighs bilaterally.\par In interim, she saw the transplant doctor at [place]. She was told that a match donor was found for her.\par \par 10/12/2018\par Patient is here for a follow up visit prior to starting her second cycle of inotuzumab. She is in process of getting testing completed for BM Transplantation. \par Her CBC was reviewed today, shows HB of 6.2 and platelets of 35. She does have small hematoma on the mucosal surface of lower lip. She denies overt bleeding. She also has SOB on exertion. \par She offers no other complaints.  [de-identified] : 67 year old female patient is here today for f/u visit. She was recently diagnosed right breast cancer. She had a screening bilateral mammogram on 10/27/16 that showed an irregularly-shaped spiculated hypoechoic mass in the right breast at the 3 o'clock axis in the periareolar region measuring 1.6 cm in greatest dimension and two additional masses in the right breast, one in the 1 o' clock axis and one in the 3 o' clock axis.  Ultrasound-guided core biopsies were done. Biopsy of large spiculated mass showed well to moderately differentiated invasive ductal carcinoma with no intraductal component, ER positive, CT positive, and HER-2/britni negative. Biopsy of other two lesions revealed radial scars on both.  There was no clinical abnormal lymphadenopathy.  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 \par \par The patient saw Dr. Oakley for surgical consultation.  On 2/7/17, she underwent right breast needle-localized lumpectomy x 2,  left breast needle-localized lumpectomy, and right axillary sentinel lymph node biopsy.  The pathology showed 1.4 cm invasive moderately differentiated duct carcinoma with focal micropapillary features and associated calcifications and duct carcinoma in situ. No LVI or PNI was seen. The invasive tumor was ER positive 100%, CT positive 85%, Her2 negative (2+ by IHC and negative by TIMOTHY). Ki-67% was less than 10%. The surgical margins were negative.  Two right axillary sentinel lymph nodes were negative for malignancy.  The AJCC 7th edition pathologic stage is IA, [id number].  Lumpectomy of the second area of the right breast and left breast did not reveal evidence of malignancy.\par \par Her Oncotype DX recurrence score is 6 corresponding to a 5% 10-year risk of distant recurrence with adjuvant hormonal therapy.\par \par She saw Dr. Jackson and finished adjuvant breast radiotherapy.\par \par In 5/2017, Bone  Density: \par ----------------------------------------------------------------- \par Region                                      BMD        T-score    Z-score      Classification \par ----------------------------------------------------------------- \par AP  Spine  (L1-L4)                  0.968      -0.7            1.0          Normal \par Femoral  Neck  (Right)          0.709      -1.3            0.2          Osteopenia \par Total  Hip  (Right)                0.975        0.3            1.5          Normal \par ----------------------------------------------------------------- \par \par She has been on adjuvant endocrine therapy with Anastrozole and tolerating well. She take calcium and vitamin D. \par In 10/2017, b/l dx mammo and sonogram did not reveal abnormal finding.\par \par She had screening colonoscopy in 12/2017, and complicated with bleeding and required hospitalization. [de-identified] : Invasive ductal

## 2018-10-19 ENCOUNTER — APPOINTMENT (OUTPATIENT)
Dept: HEMATOLOGY ONCOLOGY | Facility: CLINIC | Age: 68
End: 2018-10-19

## 2018-10-22 ENCOUNTER — APPOINTMENT (OUTPATIENT)
Dept: INFUSION THERAPY | Facility: CLINIC | Age: 68
End: 2018-10-22

## 2018-10-22 ENCOUNTER — LABORATORY RESULT (OUTPATIENT)
Age: 68
End: 2018-10-22

## 2018-10-22 ENCOUNTER — APPOINTMENT (OUTPATIENT)
Dept: HEMATOLOGY ONCOLOGY | Facility: CLINIC | Age: 68
End: 2018-10-22

## 2018-10-22 VITALS
WEIGHT: 124 LBS | RESPIRATION RATE: 16 BRPM | TEMPERATURE: 96.5 F | BODY MASS INDEX: 21.97 KG/M2 | HEART RATE: 72 BPM | SYSTOLIC BLOOD PRESSURE: 113 MMHG | HEIGHT: 63 IN | DIASTOLIC BLOOD PRESSURE: 50 MMHG

## 2018-10-22 LAB
ABO + RH PNL BLD: NORMAL
ALBUMIN SERPL ELPH-MCNC: 3.6 G/DL
ALP BLD-CCNC: 264 U/L
ALT SERPL-CCNC: 26 U/L
ANION GAP SERPL CALC-SCNC: 14 MMOL/L
AST SERPL-CCNC: 52 U/L
BILIRUB DIRECT SERPL-MCNC: 0.3 MG/DL
BILIRUB INDIRECT SERPL-MCNC: 0.6 MG/DL
BILIRUB SERPL-MCNC: 0.9 MG/DL
BLD GP AB SCN SERPL QL: ABNORMAL
BUN SERPL-MCNC: 9 MG/DL
CALCIUM SERPL-MCNC: 9 MG/DL
CHLORIDE SERPL-SCNC: 100 MMOL/L
CO2 SERPL-SCNC: 24 MMOL/L
CREAT SERPL-MCNC: <0.5 MG/DL
GLUCOSE SERPL-MCNC: 154 MG/DL
HCT VFR BLD CALC: 20 %
HCT VFR BLD CALC: 20.7 %
HCT VFR BLD CALC: 22.8 %
HCT VFR BLD CALC: 24.4 %
HCT VFR BLD CALC: 25.9 %
HGB BLD-MCNC: 6.2 G/DL
HGB BLD-MCNC: 6.5 G/DL
HGB BLD-MCNC: 7 G/DL
HGB BLD-MCNC: 7.6 G/DL
HGB BLD-MCNC: 8.1 G/DL
MCHC RBC-ENTMCNC: 28.3 PG
MCHC RBC-ENTMCNC: 28.3 PG
MCHC RBC-ENTMCNC: 28.5 PG
MCHC RBC-ENTMCNC: 28.6 PG
MCHC RBC-ENTMCNC: 28.7 PG
MCHC RBC-ENTMCNC: 30.7 G/DL
MCHC RBC-ENTMCNC: 31 G/DL
MCHC RBC-ENTMCNC: 31.1 G/DL
MCHC RBC-ENTMCNC: 31.3 G/DL
MCHC RBC-ENTMCNC: 31.4 G/DL
MCV RBC AUTO: 90.8 FL
MCV RBC AUTO: 91.3 FL
MCV RBC AUTO: 91.7 FL
MCV RBC AUTO: 91.8 FL
MCV RBC AUTO: 92.3 FL
PLATELET # BLD AUTO: 28 K/UL
PLATELET # BLD AUTO: 35 K/UL
PLATELET # BLD AUTO: 38 K/UL
PLATELET # BLD AUTO: 43 K/UL
PLATELET # BLD AUTO: 66 K/UL
PMV BLD: 10.6 FL
PMV BLD: 10.8 FL
PMV BLD: 11.6 FL
PMV BLD: 9.7 FL
PMV BLD: 9.7 FL
POTASSIUM SERPL-SCNC: 4 MMOL/L
PROT SERPL-MCNC: 7.6 G/DL
RBC # BLD: 2.19 M/UL
RBC # BLD: 2.28 M/UL
RBC # BLD: 2.47 M/UL
RBC # BLD: 2.66 M/UL
RBC # BLD: 2.82 M/UL
RBC # FLD: 18.2 %
RBC # FLD: 18.5 %
RBC # FLD: 18.5 %
RBC # FLD: 18.6 %
RBC # FLD: 18.6 %
SODIUM SERPL-SCNC: 138 MMOL/L
WBC # FLD AUTO: 3.79 K/UL
WBC # FLD AUTO: 4.01 K/UL
WBC # FLD AUTO: 4.17 K/UL
WBC # FLD AUTO: 4.2 K/UL
WBC # FLD AUTO: 4.39 K/UL

## 2018-10-23 ENCOUNTER — APPOINTMENT (OUTPATIENT)
Dept: INFUSION THERAPY | Facility: CLINIC | Age: 68
End: 2018-10-23

## 2018-10-23 LAB
ABO + RH PNL BLD: NORMAL
ALBUMIN SERPL ELPH-MCNC: 3.5 G/DL
ALP BLD-CCNC: 270 U/L
ALT SERPL-CCNC: 26 U/L
ANION GAP SERPL CALC-SCNC: 12 MMOL/L
AST SERPL-CCNC: 50 U/L
BILIRUB SERPL-MCNC: 1.1 MG/DL
BLD GP AB SCN SERPL QL: ABNORMAL
BUN SERPL-MCNC: 10 MG/DL
CALCIUM SERPL-MCNC: 9.2 MG/DL
CHLORIDE SERPL-SCNC: 98 MMOL/L
CO2 SERPL-SCNC: 25 MMOL/L
CREAT SERPL-MCNC: 0.5 MG/DL
GLUCOSE SERPL-MCNC: 61 MG/DL
HCT VFR BLD CALC: 23.5 %
HGB BLD-MCNC: 7.2 G/DL
MCHC RBC-ENTMCNC: 27.5 PG
MCHC RBC-ENTMCNC: 30.6 G/DL
MCV RBC AUTO: 89.7 FL
PLATELET # BLD AUTO: 34 K/UL
PMV BLD: 12.4 FL
POTASSIUM SERPL-SCNC: 3.8 MMOL/L
PROT SERPL-MCNC: 7.5 G/DL
RBC # BLD: 2.62 M/UL
RBC # FLD: 18.5 %
SODIUM SERPL-SCNC: 135 MMOL/L
WBC # FLD AUTO: 3.62 K/UL

## 2018-10-24 DIAGNOSIS — D64.9 ANEMIA, UNSPECIFIED: ICD-10-CM

## 2018-10-25 ENCOUNTER — APPOINTMENT (OUTPATIENT)
Dept: INFUSION THERAPY | Facility: CLINIC | Age: 68
End: 2018-10-25

## 2018-10-25 ENCOUNTER — LABORATORY RESULT (OUTPATIENT)
Age: 68
End: 2018-10-25

## 2018-10-25 LAB
HCT VFR BLD CALC: 21.7 %
HGB BLD-MCNC: 6.7 G/DL
MCHC RBC-ENTMCNC: 27.9 PG
MCHC RBC-ENTMCNC: 30.9 G/DL
MCV RBC AUTO: 90.4 FL
PLATELET # BLD AUTO: 30 K/UL
PMV BLD: 11.6 FL
RBC # BLD: 2.4 M/UL
RBC # FLD: 18.6 %
WBC # FLD AUTO: 3.46 K/UL

## 2018-10-25 RX ORDER — DIPHENHYDRAMINE HCL 50 MG
25 CAPSULE ORAL ONCE
Qty: 0 | Refills: 0 | Status: COMPLETED | OUTPATIENT
Start: 2018-10-25 | End: 2018-10-25

## 2018-10-25 RX ORDER — ACETAMINOPHEN 500 MG
650 TABLET ORAL ONCE
Qty: 0 | Refills: 0 | Status: COMPLETED | OUTPATIENT
Start: 2018-10-25 | End: 2018-10-25

## 2018-10-25 RX ADMIN — Medication 650 MILLIGRAM(S): at 09:52

## 2018-10-25 RX ADMIN — Medication 25 MILLIGRAM(S): at 09:52

## 2018-10-26 ENCOUNTER — APPOINTMENT (OUTPATIENT)
Dept: INFUSION THERAPY | Facility: CLINIC | Age: 68
End: 2018-10-26

## 2018-10-26 ENCOUNTER — APPOINTMENT (OUTPATIENT)
Dept: HEMATOLOGY ONCOLOGY | Facility: CLINIC | Age: 68
End: 2018-10-26

## 2018-10-29 ENCOUNTER — APPOINTMENT (OUTPATIENT)
Dept: HEMATOLOGY ONCOLOGY | Facility: CLINIC | Age: 68
End: 2018-10-29

## 2018-10-29 ENCOUNTER — APPOINTMENT (OUTPATIENT)
Dept: INFUSION THERAPY | Facility: CLINIC | Age: 68
End: 2018-10-29

## 2018-10-29 ENCOUNTER — LABORATORY RESULT (OUTPATIENT)
Age: 68
End: 2018-10-29

## 2018-10-29 VITALS
TEMPERATURE: 97.2 F | RESPIRATION RATE: 16 BRPM | HEIGHT: 63 IN | WEIGHT: 124 LBS | HEART RATE: 76 BPM | DIASTOLIC BLOOD PRESSURE: 60 MMHG | BODY MASS INDEX: 21.97 KG/M2 | SYSTOLIC BLOOD PRESSURE: 129 MMHG

## 2018-11-01 ENCOUNTER — APPOINTMENT (OUTPATIENT)
Dept: INFUSION THERAPY | Facility: CLINIC | Age: 68
End: 2018-11-01

## 2018-11-01 ENCOUNTER — LABORATORY RESULT (OUTPATIENT)
Age: 68
End: 2018-11-01

## 2018-11-03 NOTE — ASSESSMENT
[FreeTextEntry1] : 1. Ph- B cell lymphoblastic leukemia, s/p 3 cycles of HyperCVAD with MRD s/p 1 cycle of inotuzumab.\par 2. Anemia and thrombocytopenia.\par 3. History of Stage IA, [id number], ER/AK postive, Her 2 negative invasive moderate differentiated ductal carcinoma of the right breast, s/p lumpectomy and axillary SLN biopsy.\par \par PLAN:  \par -- Repeat CBC reviewed. PLT is > 30,  Hb 7.6. Will observe and schedule repeat CBC on Thursday.\par -- Monitor CBC twice a week. Discussed with Dr. Fuchs regarding worsening anemia and thrombocytopenia. Etiology is most likely due to Inotuzumab because her recent BM biopsy shows that she is in remission.\par -- Continue to hold inotuzumab.\par -- She is going to have transplant with haplo/cord stem cells. She will followup with transplant specialist at [place].\par -- Continue prophylactic Bactrim DS three times per week and Valtrex 500 mg daily.\par -- She should call if she develops fever or other problems.. \par -- Continue Anastrozole, calcium and vitamin D supplement. \par \par \par

## 2018-11-03 NOTE — HISTORY OF PRESENT ILLNESS
[Disease: _____________________] : Disease: [unfilled] [T: ___] : T[unfilled] [N: ___] : N[unfilled] [M: ___] : M[unfilled] [AJCC Stage: ____] : AJCC Stage: [unfilled] [de-identified] : 67 year old female patient is here today for f/u visit. She was recently diagnosed right breast cancer. She had a screening bilateral mammogram on 10/27/16 that showed an irregularly-shaped spiculated hypoechoic mass in the right breast at the 3 o'clock axis in the periareolar region measuring 1.6 cm in greatest dimension and two additional masses in the right breast, one in the 1 o' clock axis and one in the 3 o' clock axis.  Ultrasound-guided core biopsies were done. Biopsy of large spiculated mass showed well to moderately differentiated invasive ductal carcinoma with no intraductal component, ER positive, MO positive, and HER-2/britni negative. Biopsy of other two lesions revealed radial scars on both.  There was no clinical abnormal lymphadenopathy.  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 \par \par The patient saw Dr. Oakley for surgical consultation.  On 2/7/17, she underwent right breast needle-localized lumpectomy x 2,  left breast needle-localized lumpectomy, and right axillary sentinel lymph node biopsy.  The pathology showed 1.4 cm invasive moderately differentiated duct carcinoma with focal micropapillary features and associated calcifications and duct carcinoma in situ. No LVI or PNI was seen. The invasive tumor was ER positive 100%, MO positive 85%, Her2 negative (2+ by IHC and negative by TIMOTHY). Ki-67% was less than 10%. The surgical margins were negative.  Two right axillary sentinel lymph nodes were negative for malignancy.  The AJCC 7th edition pathologic stage is IA, [id number].  Lumpectomy of the second area of the right breast and left breast did not reveal evidence of malignancy.\par \par Her Oncotype DX recurrence score is 6 corresponding to a 5% 10-year risk of distant recurrence with adjuvant hormonal therapy.\par \par She saw Dr. Jackson and finished adjuvant breast radiotherapy.\par \par In 5/2017, Bone  Density: \par ----------------------------------------------------------------- \par Region                                      BMD        T-score    Z-score      Classification \par ----------------------------------------------------------------- \par AP  Spine  (L1-L4)                  0.968      -0.7            1.0          Normal \par Femoral  Neck  (Right)          0.709      -1.3            0.2          Osteopenia \par Total  Hip  (Right)                0.975        0.3            1.5          Normal \par ----------------------------------------------------------------- \par \par She has been on adjuvant endocrine therapy with Anastrozole and tolerating well. She take calcium and vitamin D. \par In 10/2017, b/l dx mammo and sonogram did not reveal abnormal finding.\par \par She had screening colonoscopy in 12/2017, and complicated with bleeding and required hospitalization. [de-identified] : Invasive ductal [de-identified] : The patient was admitted to [facility] in 5/2018 for fatigue, SOB and anemia. She was found to have leukopenia, glenys positive AIHA, lymphadenopathy. 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 She was subsequently transferred to [facility] and received induction chemotherapy with HyperCVAD. She completed 1st cycle chemo and discharged home on 7/15. She is here today to check CBC and have Neulasta injection.\par \par She reports feeling fatigue. She does not have fever, N/V/D.\par \par On 9/6/2018:\par Narcisa has been treated by Dr. Fuchs at [facility]. She received 3 cycles of HyperCVAD and achieved good clinical response. However, her repeat bone marrow still showed MRD. Dr. Fuchs recommended her to have Inotuzumab iv on day 1, 8 and 15, every 4 weeks. He explained benefit and side effects to the patient. She agreed to take treatment. Since she livers on Wedron, she wants to come back for treatment. \par She was just discharged from hospital on this Tuesday after being treated for cellulitis of her left arm and pain in her right low leg. She is feeling better. She does not have fever. She has a picc line on her left arm.\par \par 09/27/18:\par Narcisa is here today for re-valuation and treatment. She has received Inotuzumab weekly x 2 and tolerated well. She di not have any fever, rash, n/v/d or abdominal pain. Her liver enzymes were mild elevated. Her PLT counts went down to 75 after 1st treatment. She complains pain in her leg and lumps behind her thighs bilaterally.\par In interim, she saw the transplant doctor at [facility]. She was told that a match donor was found for her.\par \par 10/12/2018\par Patient is here for a follow up visit prior to starting her second cycle of inotuzumab. She is in process of getting testing completed for BM Transplantation. \par Her CBC was reviewed today, shows HB of 6.2 and platelets of 35. She does have small hematoma on the mucosal surface of lower lip. She denies overt bleeding. She also has SOB on exertion. \par She offers no other complaints. \par \par 10/22/18:\par The patient is here today for followup visit. The 2nd cycle inotuzumab has been held due to anemia and thrombocytopenia. She received total of 3 units PRBC in past 10 days. There is no sign of bleeding. She is afebrile. She saw Dr. Fuchs and bone marrow biopsy on 10/3/18, which was good ad per Dr. Fuchs. She is under evaluation for transplant.\par \par 10/29/18:\par The patient is here today for followup visit. The 2nd cycle inotuzumab has been held due to anemia and thrombocytopenia. She received 2 units PRBC last Thursday. There is no sign of bleeding. She is afebrile. She saw Dr. Fuchs and bone marrow biopsy on 10/3/18, which was good as per Dr. Fuchs. She is under evaluation for transplant.\par

## 2018-11-03 NOTE — PHYSICAL EXAM
BREASTFEEDING SERVICES NOTE:    Time spent with mother/baby: 3 minutes.    Mom states she is going to feed formula and may resume breast feeding at home.  Discussed milk supply, engorgement and possibly pumping and bottle feeding.  Parents comfortable with their decision.    Parents will contact lactation with concerns     [Restricted in physically strenuous activity but ambulatory and able to carry out work of a light or sedentary nature] : Status 1- Restricted in physically strenuous activity but ambulatory and able to carry out work of a light or sedentary nature, e.g., light house work, office work [Normal] : no peripheral adenopathy appreciated [de-identified] : small hematoma on mucosal surface if lower lip [de-identified] : Status post right breast lumpectomy and right axillary SLN biopsy.  .  Left breast lumpectomy.  There is no palpable axillary lymphadenopathy bilaterally. [de-identified] : there are small ecchymosis on hands [de-identified] : There are scattered ecchymoses on limbs.

## 2018-11-05 ENCOUNTER — LABORATORY RESULT (OUTPATIENT)
Age: 68
End: 2018-11-05

## 2018-11-05 ENCOUNTER — APPOINTMENT (OUTPATIENT)
Dept: HEMATOLOGY ONCOLOGY | Facility: CLINIC | Age: 68
End: 2018-11-05

## 2018-11-05 ENCOUNTER — APPOINTMENT (OUTPATIENT)
Dept: INFUSION THERAPY | Facility: CLINIC | Age: 68
End: 2018-11-05

## 2018-11-05 ENCOUNTER — OUTPATIENT (OUTPATIENT)
Dept: OUTPATIENT SERVICES | Facility: HOSPITAL | Age: 68
LOS: 1 days | Discharge: HOME | End: 2018-11-05

## 2018-11-05 DIAGNOSIS — D69.6 THROMBOCYTOPENIA, UNSPECIFIED: ICD-10-CM

## 2018-11-05 DIAGNOSIS — C91.00 ACUTE LYMPHOBLASTIC LEUKEMIA NOT HAVING ACHIEVED REMISSION: ICD-10-CM

## 2018-11-05 DIAGNOSIS — Z98.890 OTHER SPECIFIED POSTPROCEDURAL STATES: Chronic | ICD-10-CM

## 2018-11-05 DIAGNOSIS — Z00.00 ENCOUNTER FOR GENERAL ADULT MEDICAL EXAMINATION W/OUT ABNORMAL FINDINGS: ICD-10-CM

## 2018-11-05 DIAGNOSIS — Z96.642 PRESENCE OF LEFT ARTIFICIAL HIP JOINT: Chronic | ICD-10-CM

## 2018-11-05 DIAGNOSIS — D64.9 ANEMIA, UNSPECIFIED: ICD-10-CM

## 2018-11-05 RX ORDER — ALPRAZOLAM 0.25 MG/1
0.25 TABLET ORAL
Qty: 30 | Refills: 0 | Status: ACTIVE | COMMUNITY
Start: 2018-11-05 | End: 1900-01-01

## 2018-11-06 ENCOUNTER — APPOINTMENT (OUTPATIENT)
Dept: INFUSION THERAPY | Facility: CLINIC | Age: 68
End: 2018-11-06

## 2018-11-06 ENCOUNTER — EMERGENCY (EMERGENCY)
Facility: HOSPITAL | Age: 68
LOS: 0 days | Discharge: HOME | End: 2018-11-07
Attending: EMERGENCY MEDICINE | Admitting: EMERGENCY MEDICINE

## 2018-11-06 ENCOUNTER — OTHER (OUTPATIENT)
Age: 68
End: 2018-11-06

## 2018-11-06 VITALS
HEART RATE: 92 BPM | OXYGEN SATURATION: 99 % | TEMPERATURE: 99 F | RESPIRATION RATE: 18 BRPM | DIASTOLIC BLOOD PRESSURE: 55 MMHG | SYSTOLIC BLOOD PRESSURE: 116 MMHG

## 2018-11-06 DIAGNOSIS — Z87.891 PERSONAL HISTORY OF NICOTINE DEPENDENCE: ICD-10-CM

## 2018-11-06 DIAGNOSIS — D64.89 OTHER SPECIFIED ANEMIAS: ICD-10-CM

## 2018-11-06 DIAGNOSIS — Z96.642 PRESENCE OF LEFT ARTIFICIAL HIP JOINT: Chronic | ICD-10-CM

## 2018-11-06 DIAGNOSIS — C91.00 ACUTE LYMPHOBLASTIC LEUKEMIA NOT HAVING ACHIEVED REMISSION: ICD-10-CM

## 2018-11-06 DIAGNOSIS — C50.919 MALIGNANT NEOPLASM OF UNSPECIFIED SITE OF UNSPECIFIED FEMALE BREAST: ICD-10-CM

## 2018-11-06 DIAGNOSIS — Z98.890 OTHER SPECIFIED POSTPROCEDURAL STATES: Chronic | ICD-10-CM

## 2018-11-06 DIAGNOSIS — R07.89 OTHER CHEST PAIN: ICD-10-CM

## 2018-11-06 LAB
ANION GAP SERPL CALC-SCNC: 13 MMOL/L — SIGNIFICANT CHANGE UP (ref 7–14)
APPEARANCE UR: CLEAR — SIGNIFICANT CHANGE UP
APTT BLD: 36 SEC — SIGNIFICANT CHANGE UP (ref 27–39.2)
BASOPHILS # BLD AUTO: 0.23 K/UL — HIGH (ref 0–0.2)
BASOPHILS NFR BLD AUTO: 1 % — SIGNIFICANT CHANGE UP (ref 0–1)
BILIRUB UR-MCNC: NEGATIVE — SIGNIFICANT CHANGE UP
BLD GP AB SCN SERPL QL: ABNORMAL
BUN SERPL-MCNC: 7 MG/DL — LOW (ref 10–20)
CALCIUM SERPL-MCNC: 9.9 MG/DL — SIGNIFICANT CHANGE UP (ref 8.5–10.1)
CHLORIDE SERPL-SCNC: 103 MMOL/L — SIGNIFICANT CHANGE UP (ref 98–110)
CO2 SERPL-SCNC: 23 MMOL/L — SIGNIFICANT CHANGE UP (ref 17–32)
COLOR SPEC: YELLOW — SIGNIFICANT CHANGE UP
CREAT SERPL-MCNC: 0.7 MG/DL — SIGNIFICANT CHANGE UP (ref 0.7–1.5)
D DIMER BLD IA.RAPID-MCNC: 4850 NG/ML DDU — HIGH (ref 0–230)
DIFF PNL FLD: NEGATIVE — SIGNIFICANT CHANGE UP
DIR ANTIGLOB POLYSPECIFIC INTERPRETATION: SIGNIFICANT CHANGE UP
EOSINOPHIL # BLD AUTO: 0.06 K/UL — SIGNIFICANT CHANGE UP (ref 0–0.7)
EOSINOPHIL NFR BLD AUTO: 0.3 % — SIGNIFICANT CHANGE UP (ref 0–8)
GLUCOSE SERPL-MCNC: 108 MG/DL — HIGH (ref 70–99)
GLUCOSE UR QL: NEGATIVE MG/DL — SIGNIFICANT CHANGE UP
HCT VFR BLD CALC: 22.8 % — LOW (ref 37–47)
HCT VFR BLD CALC: 25.8 % — LOW (ref 37–47)
HGB BLD-MCNC: 7 G/DL — CRITICAL LOW (ref 12–16)
HGB BLD-MCNC: 8.3 G/DL — LOW (ref 12–16)
IMM GRANULOCYTES NFR BLD AUTO: 9.2 % — HIGH (ref 0.1–0.3)
INR BLD: 1.33 RATIO — HIGH (ref 0.65–1.3)
KETONES UR-MCNC: NEGATIVE — SIGNIFICANT CHANGE UP
LACTATE SERPL-SCNC: 1 MMOL/L — SIGNIFICANT CHANGE UP (ref 0.5–2.2)
LEUKOCYTE ESTERASE UR-ACNC: NEGATIVE — SIGNIFICANT CHANGE UP
LYMPHOCYTES # BLD AUTO: 1.78 K/UL — SIGNIFICANT CHANGE UP (ref 1.2–3.4)
LYMPHOCYTES # BLD AUTO: 7.6 % — LOW (ref 20.5–51.1)
MCHC RBC-ENTMCNC: 27.8 PG — SIGNIFICANT CHANGE UP (ref 27–31)
MCHC RBC-ENTMCNC: 28.7 PG — SIGNIFICANT CHANGE UP (ref 27–31)
MCHC RBC-ENTMCNC: 30.7 G/DL — LOW (ref 32–37)
MCHC RBC-ENTMCNC: 32.2 G/DL — SIGNIFICANT CHANGE UP (ref 32–37)
MCV RBC AUTO: 89.3 FL — SIGNIFICANT CHANGE UP (ref 81–99)
MCV RBC AUTO: 90.5 FL — SIGNIFICANT CHANGE UP (ref 81–99)
MONOCYTES # BLD AUTO: 13.04 K/UL — HIGH (ref 0.1–0.6)
MONOCYTES NFR BLD AUTO: 55.6 % — HIGH (ref 1.7–9.3)
NEUTROPHILS # BLD AUTO: 6.19 K/UL — SIGNIFICANT CHANGE UP (ref 1.4–6.5)
NEUTROPHILS NFR BLD AUTO: 26.3 % — LOW (ref 42.2–75.2)
NITRITE UR-MCNC: NEGATIVE — SIGNIFICANT CHANGE UP
NRBC # BLD: 8 /100 WBCS — HIGH (ref 0–0)
NRBC # BLD: 8 /100 WBCS — HIGH (ref 0–0)
PH UR: 7 — SIGNIFICANT CHANGE UP (ref 5–8)
PLATELET # BLD AUTO: 35 K/UL — LOW (ref 130–400)
PLATELET # BLD AUTO: 41 K/UL — LOW (ref 130–400)
POTASSIUM SERPL-MCNC: 3.8 MMOL/L — SIGNIFICANT CHANGE UP (ref 3.5–5)
POTASSIUM SERPL-SCNC: 3.8 MMOL/L — SIGNIFICANT CHANGE UP (ref 3.5–5)
PROT UR-MCNC: NEGATIVE MG/DL — SIGNIFICANT CHANGE UP
PROTHROM AB SERPL-ACNC: 15.2 SEC — HIGH (ref 9.95–12.87)
RBC # BLD: 2.52 M/UL — LOW (ref 4.2–5.4)
RBC # BLD: 2.89 M/UL — LOW (ref 4.2–5.4)
RBC # FLD: 18.4 % — HIGH (ref 11.5–14.5)
RBC # FLD: 19.5 % — HIGH (ref 11.5–14.5)
SODIUM SERPL-SCNC: 139 MMOL/L — SIGNIFICANT CHANGE UP (ref 135–146)
SP GR SPEC: <=1.005 — SIGNIFICANT CHANGE UP (ref 1.01–1.03)
TROPONIN T SERPL-MCNC: <0.01 NG/ML — SIGNIFICANT CHANGE UP
TROPONIN T SERPL-MCNC: <0.01 NG/ML — SIGNIFICANT CHANGE UP
TYPE + AB SCN PNL BLD: SIGNIFICANT CHANGE UP
UROBILINOGEN FLD QL: 0.2 MG/DL — SIGNIFICANT CHANGE UP (ref 0.2–0.2)
WBC # BLD: 20.95 K/UL — HIGH (ref 4.8–10.8)
WBC # BLD: 23.99 K/UL — HIGH (ref 4.8–10.8)
WBC # FLD AUTO: 20.95 K/UL — HIGH (ref 4.8–10.8)
WBC # FLD AUTO: 23.99 K/UL — HIGH (ref 4.8–10.8)

## 2018-11-06 RX ORDER — MORPHINE SULFATE 50 MG/1
4 CAPSULE, EXTENDED RELEASE ORAL ONCE
Qty: 0 | Refills: 0 | Status: DISCONTINUED | OUTPATIENT
Start: 2018-11-06 | End: 2018-11-06

## 2018-11-06 RX ORDER — OXYCODONE AND ACETAMINOPHEN 5; 325 MG/1; MG/1
1 TABLET ORAL ONCE
Qty: 0 | Refills: 0 | Status: DISCONTINUED | OUTPATIENT
Start: 2018-11-06 | End: 2018-11-06

## 2018-11-06 RX ADMIN — MORPHINE SULFATE 4 MILLIGRAM(S): 50 CAPSULE, EXTENDED RELEASE ORAL at 01:11

## 2018-11-06 RX ADMIN — MORPHINE SULFATE 4 MILLIGRAM(S): 50 CAPSULE, EXTENDED RELEASE ORAL at 16:38

## 2018-11-06 RX ADMIN — OXYCODONE AND ACETAMINOPHEN 1 TABLET(S): 5; 325 TABLET ORAL at 20:45

## 2018-11-06 RX ADMIN — MORPHINE SULFATE 4 MILLIGRAM(S): 50 CAPSULE, EXTENDED RELEASE ORAL at 09:48

## 2018-11-06 RX ADMIN — OXYCODONE AND ACETAMINOPHEN 1 TABLET(S): 5; 325 TABLET ORAL at 20:15

## 2018-11-06 NOTE — ED PROVIDER NOTE - MEDICAL DECISION MAKING DETAILS
Chart finished.  67 yo woman with known history of breast CA and ALL presents for left sided pleuritic chest pain and anemia.  Sent for transfusion of blood.  In process, PE workup and ACS workup done.  All was ok.  Transfusion initiated.  Luekocytosis noted.  Case discussed with oncology who was not too concerned.  No evidence of sepsis.  Plan is close follow up this week with oncology Dr. Fuchs this Thursday after transfusions of blood.

## 2018-11-06 NOTE — ED PROVIDER NOTE - DIAGNOSTIC INTERPRETATION
Lung marking throughout lung fields, no sign of pneumothorax.  No opacifications or consolidations.  No widening of the mediastinum.  Support device L PICC in place.

## 2018-11-06 NOTE — ED PROVIDER NOTE - SHIFT CHANGE DETAILS
I received sign out.  Despite leukocytosis, no evidence of bacterial infection and no evidence of sepsis.  Oncology aware of this as well.  Plan is transfuse and then discharge with outpatient follow up this Thirsday with Dr. Fuchs from [place] for discussion about treatment of her ALL.

## 2018-11-06 NOTE — ED PROVIDER NOTE - ATTENDING CONTRIBUTION TO CARE
68 y.o F w/ PMHx Nikita CA on anastrazole in remission, and ALL presents from the [facility] for L sided chest pain and transfusion for symptomatic anemia. Pt follows Dr. Nelson for weekly CBC and blood transfusion. This AM she also endorsed L lower pleuritic chest pain that began 2-3 days ago waking her up from sleep. She states deep breaths make it worse and laying flat helps.     Patient low risk for PE via Well's score.  D-dimer sent and high and CT scan for PE study ordered.  Patient being transfused as recommended by Heme/Onc (They requested 2 units of blood).  CXR reviewed.  WBC count elevated (was 3,900 recently and now 22,000).  Will send CBC with diff, get urine studies, lacate, and look for source of infection. Will discuss with Heme/Onc. Will follow up ED work up and dispo accordingly.

## 2018-11-06 NOTE — ED PROVIDER NOTE - PROGRESS NOTE DETAILS
68 y.o F w/ Hx Breast CA, and ALL p/w L lower pleuritic CP and blood transfusion. Pt is low risk for PE and not perc negative. Will dimer and f/u CE to rule out ACS. CXR to rule out PNA and aortic disease. Hb 7.0, will transfuse. Dimer elevated. Will f/u with CTA chest.

## 2018-11-06 NOTE — ED CDU PROVIDER INITIAL DAY NOTE - OBJECTIVE STATEMENT
69y/o female with pmh of ALL, breast ca, pt. was sent here from [facility] for cp and blood transfusion. pt. states cp was pleuritic. denies fever, cough, vomiting. cp resolved. pt. was placed in obs for blood trasnfusion

## 2018-11-06 NOTE — ED CDU PROVIDER INITIAL DAY NOTE - ATTENDING CONTRIBUTION TO CARE
Seen with PA agree with above, lungs- clear, abdomen- soft no tenderness to any region, Transfusion in progress will continue to re-evaluate

## 2018-11-06 NOTE — ED PROVIDER NOTE - NS ED ROS FT
Constitutional:  No fever, chills, lethargy, or abnormal weight loss  Eyes:  No eye pain or visual changes  ENMT: No nasal discharge, no toothache, no sore throat. +crusting on her lips.  Cardiac:  L lower pleuritic CP  Respiratory:  No cough or respiratory distress.   GI:  No nausea, vomiting, diarrhea or abdominal pain.  :  No dysuria, frequency or burning.  MS:  No back or joint pain.  Neuro:  No headache. No numbness, weakness, or tingling.   Skin:  No skin rash  Except as documented in the HPI,  all other systems are negative

## 2018-11-06 NOTE — ED PROVIDER NOTE - OBJECTIVE STATEMENT
68 y.o F w/ PMHx Nikita CA on anastrazole in remission, and ALL presents from the [facility] for L sided chest pain and transfusion. Pt follows Dr. Nelson for weekly CBC and blood transfusion. This AM she also endorsed L lower pleuritic chest pain that began 2-3 days ago waking her up from sleep. She states deep breaths make it worse and laying flat helps. She hasn't taken anything for the pain and has never experienced this pain before. She denies cough, fever, N/V, HA, blurry vision, lightheadedness, dizziness, weakness, constipation/diarrhea, urinary symptoms, or vaginal discharge.

## 2018-11-06 NOTE — ED ADULT TRIAGE NOTE - CHIEF COMPLAINT QUOTE
Patient hx of leukemia. reports left sided chest / rib pain/ s ob  with  dry cough. Denies fever . lung sounds clear - pain reproducible to left rib. mucous membranes dry .patient also states hgb is 7.6

## 2018-11-06 NOTE — ED ADULT NURSE NOTE - NSIMPLEMENTINTERV_GEN_ALL_ED
Implemented All Fall with Harm Risk Interventions:  [place] to call system. Call bell, personal items and telephone within reach. Instruct patient to call for assistance. Room bathroom lighting operational. Non-slip footwear when patient is off stretcher. Physically safe environment: no spills, clutter or unnecessary equipment. Stretcher in lowest position, wheels locked, appropriate side rails in place. Provide visual cue, wrist band, yellow gown, etc. Monitor gait and stability. Monitor for mental status changes and reorient to person, place, and time. Review medications for side effects contributing to fall risk. Reinforce activity limits and safety measures with patient and family. Provide visual clues: red socks.

## 2018-11-06 NOTE — ED PROVIDER NOTE - PHYSICAL EXAMINATION
CONSTITUTIONAL: well-appearing, in NAD  SKIN: Warm dry, normal skin turgor  HEAD: NCAT  EYES: EOMI, PERRLA, no scleral icterus, pink conjunctiva.  ENT: no sinus tenderness to percussion, normal dental exam, normal pharynx with no erythema or exudates  NECK: Supple; non tender. Full ROM.  CARD: RRR, no murmurs.  RESP: clear to ausculation b/l.  No rales, rhonchi, or wheezing.  ABD: soft, + BS, non-tender, non-distended, no rebound or guarding. No CVA tenderness  EXT: Full ROM, no bony tenderness, no pedal edema, no calf tenderness  NEURO: normal motor. normal sensory.  PSYCH: Cooperative, appropriate. CONSTITUTIONAL: well-appearing, in NAD  SKIN: Warm dry, normal skin turgor  HEAD: NCAT  EYES: EOMI, PERRLA, no scleral icterus, pink conjunctiva.  ENT: no sinus tenderness to percussion, normal dental exam, normal pharynx with no erythema or exudates  NECK: Supple; non tender. Full ROM.  CARD: RRR, no murmurs.  RESP: clear to ausculation b/l.  No rales, rhonchi, or wheezing.  ABD: soft, + BS, non-tender, non-distended, no rebound or guarding. No CVA tenderness  EXT: Full ROM, no bony tenderness, no pedal edema, no calf tenderness. L UE PICC line.  NEURO: normal motor. normal sensory.  PSYCH: Cooperative, appropriate.

## 2018-11-07 VITALS
TEMPERATURE: 98 F | SYSTOLIC BLOOD PRESSURE: 144 MMHG | OXYGEN SATURATION: 99 % | DIASTOLIC BLOOD PRESSURE: 67 MMHG | HEART RATE: 75 BPM | RESPIRATION RATE: 18 BRPM

## 2018-11-07 LAB
ABO + RH PNL BLD: NORMAL
ABO + RH PNL BLD: NORMAL
BLD GP AB SCN SERPL QL: ABNORMAL
BLD GP AB SCN SERPL QL: ABNORMAL
HAPTOGLOB SERPL-MCNC: 114 MG/DL
HCT VFR BLD CALC: 23.4 %
HCT VFR BLD CALC: 24.1 %
HCT VFR BLD CALC: 25.8 %
HGB BLD-MCNC: 7.4 G/DL
HGB BLD-MCNC: 7.6 G/DL
HGB BLD-MCNC: 8.2 G/DL
LDH SERPL-CCNC: 374
MCHC RBC-ENTMCNC: 28.4 PG
MCHC RBC-ENTMCNC: 28.6 PG
MCHC RBC-ENTMCNC: 28.6 PG
MCHC RBC-ENTMCNC: 31.5 G/DL
MCHC RBC-ENTMCNC: 31.6 G/DL
MCHC RBC-ENTMCNC: 31.8 G/DL
MCV RBC AUTO: 89.9 FL
MCV RBC AUTO: 89.9 FL
MCV RBC AUTO: 90.3 FL
PLATELET # BLD AUTO: 27 K/UL
PLATELET # BLD AUTO: 32 K/UL
PLATELET # BLD AUTO: 37 K/UL
PMV BLD: 11.6 FL
PMV BLD: 11.6 FL
PMV BLD: 11.8 FL
RBC # BLD: 2.59 M/UL
RBC # BLD: 2.68 M/UL
RBC # BLD: 2.87 M/UL
RBC # FLD: 18.3 %
RBC # FLD: 18.6 %
RBC # FLD: 19.3 %
RETICS # AUTO: 2.5 %
RETICS AGGREG/RBC NFR: 70.9 K/UL
WBC # FLD AUTO: 12.67 K/UL
WBC # FLD AUTO: 3.62 K/UL
WBC # FLD AUTO: 5.17 K/UL

## 2018-11-07 RX ORDER — OXYCODONE AND ACETAMINOPHEN 5; 325 MG/1; MG/1
1 TABLET ORAL ONCE
Qty: 0 | Refills: 0 | Status: DISCONTINUED | OUTPATIENT
Start: 2018-11-07 | End: 2018-11-07

## 2018-11-07 RX ADMIN — OXYCODONE AND ACETAMINOPHEN 1 TABLET(S): 5; 325 TABLET ORAL at 01:15

## 2018-11-07 RX ADMIN — OXYCODONE AND ACETAMINOPHEN 1 TABLET(S): 5; 325 TABLET ORAL at 00:45

## 2018-11-07 NOTE — ED ADULT NURSE REASSESSMENT NOTE - NS ED NURSE REASSESS COMMENT FT1
Pt A&Ox4, placed in observation for anemia, s/p 2 PRBC with no transfusion reactions. Pt in no acute distress at this time, resting comfortably. Vitals stable, pt verbalizes more energy. Will continue to monitor.
Pt reassessed A.O time 4 Vs stable report filling slight better dinner tray provide did eat 75% ,pt waiting for 2 nd blood transfusion is order not ready , blood bank is collet ,no ready at this  time MD aware . Will continue to monitor .
Pt reassessed A/O times 4 Vs se flow sheet denies  no chest pain no SOB ,C/O nausea, weakness, pt is  seen evaluate by ED attending  1 unit PRBC order is given on going on progress ,  pt tolereted well no sign of allergic reaction noted or report at this time on going nursing observation .
pt discharged to home, patient taken to ER waiting room from ED 3 via wheelchair.  no s/s of distress. pt verbalizes understanding of d/c instructions. PIV access removed, bleeding controlled, dressing intact.
received pt @ 0700, Pt A&Ox4, calm, VSS, no acute distress noted. Pt currently denies pain or discomfort. patient reports feeling better at this time. Safety and comfort measures provided. Will continue to monitor.
patient finished blood transfusion.  Patient tolerated well with no adverse reaction noted.  Patient VS stable and documnetated.
patient placed in observation unit for further observation anemia and pleuritic CP. Patient at bed side waiting for transfusion of RPBC.  RN waiting for blood bank to give transfusion. Blood bank said maybe will have blood by 9:00pm.
patient receiving 1 unit PRBC started at 9:00pm.  Patient tolerating well with no adverse reaction noted.  Patient VS stable and documentated.  Will continue to monitor.
patient transferred to ED3 for further observation for anemia and pleuritic CP.  Patient placed in room 7.  Report given to TL Guardado.  Patient in stable condition and nad.

## 2018-11-07 NOTE — ED CDU PROVIDER DISPOSITION NOTE - CARE PROVIDER_API CALL
Nmio Nelson), Hematology; Internal Medicine; Medical Oncology  [address] [place] [address]  Phone: [phone]  Fax: [phone]

## 2018-11-07 NOTE — ED CDU PROVIDER DISPOSITION NOTE - NSFOLLOWUPINSTRUCTIONS_ED_ALL_ED_FT

## 2018-11-07 NOTE — ED CDU PROVIDER DISPOSITION NOTE - CLINICAL COURSE
Patient was sent to EDOU for blood transfusion, has remained in no distress and with stable vital;s, ekg- no evidence of ischemic changes, enzymes normal, Patient received 2- units of PRBCs and tolerating transfusion well and without any side effects, discharge to fallow up with PMD this week

## 2018-11-07 NOTE — ED CDU PROVIDER SUBSEQUENT DAY NOTE - PROGRESS NOTE DETAILS
Pt seen and examined at bedside, resting comfortably, transfusion completed.  Pt reports to feeling better.  Pt has appt to follow up in [place] tomorrow.  Will dc home follow up as outpt with Oncology.

## 2018-11-08 ENCOUNTER — APPOINTMENT (OUTPATIENT)
Dept: INFUSION THERAPY | Facility: CLINIC | Age: 68
End: 2018-11-08

## 2018-11-08 ENCOUNTER — INBOUND DOCUMENT (OUTPATIENT)
Age: 68
End: 2018-11-08

## 2018-11-08 LAB
CULTURE RESULTS: NO GROWTH — SIGNIFICANT CHANGE UP
SPECIMEN SOURCE: SIGNIFICANT CHANGE UP

## 2018-11-12 ENCOUNTER — APPOINTMENT (OUTPATIENT)
Dept: INFUSION THERAPY | Facility: CLINIC | Age: 68
End: 2018-11-12

## 2018-11-15 ENCOUNTER — APPOINTMENT (OUTPATIENT)
Dept: INFUSION THERAPY | Facility: CLINIC | Age: 68
End: 2018-11-15

## 2018-11-19 ENCOUNTER — APPOINTMENT (OUTPATIENT)
Dept: INFUSION THERAPY | Facility: CLINIC | Age: 68
End: 2018-11-19

## 2018-11-23 ENCOUNTER — APPOINTMENT (OUTPATIENT)
Dept: INFUSION THERAPY | Facility: CLINIC | Age: 68
End: 2018-11-23

## 2018-11-26 ENCOUNTER — APPOINTMENT (OUTPATIENT)
Dept: INFUSION THERAPY | Facility: CLINIC | Age: 68
End: 2018-11-26

## 2018-11-26 ENCOUNTER — APPOINTMENT (OUTPATIENT)
Dept: HEMATOLOGY ONCOLOGY | Facility: CLINIC | Age: 68
End: 2018-11-26

## 2018-11-29 ENCOUNTER — APPOINTMENT (OUTPATIENT)
Dept: INFUSION THERAPY | Facility: CLINIC | Age: 68
End: 2018-11-29

## 2019-01-13 LAB
ALBUMIN SERPL ELPH-MCNC: 4.1 G/DL
ALP BLD-CCNC: 155 U/L
ALT SERPL-CCNC: 38 U/L
ANION GAP SERPL CALC-SCNC: 19 MMOL/L
AST SERPL-CCNC: 25 U/L
BILIRUB SERPL-MCNC: 0.5 MG/DL
BUN SERPL-MCNC: 14 MG/DL
CALCIUM SERPL-MCNC: 9.7 MG/DL
CHLORIDE SERPL-SCNC: 94 MMOL/L
CO2 SERPL-SCNC: 25 MMOL/L
CREAT SERPL-MCNC: 0.5 MG/DL
GLUCOSE SERPL-MCNC: 92 MG/DL
HCT VFR BLD CALC: 27.6 %
HCT VFR BLD CALC: 28.6 %
HGB BLD-MCNC: 9 G/DL
HGB BLD-MCNC: 9 G/DL
MCHC RBC-ENTMCNC: 28.9 PG
MCHC RBC-ENTMCNC: 29.3 PG
MCHC RBC-ENTMCNC: 31.5 G/DL
MCHC RBC-ENTMCNC: 32.6 G/DL
MCV RBC AUTO: 89.9 FL
MCV RBC AUTO: 92 FL
PLATELET # BLD AUTO: 156 K/UL
PLATELET # BLD AUTO: 269 K/UL
PMV BLD: 8.9 FL
PMV BLD: 9 FL
POTASSIUM SERPL-SCNC: 4.1 MMOL/L
PROT SERPL-MCNC: 7.5 G/DL
RBC # BLD: 3.07 M/UL
RBC # BLD: 3.11 M/UL
RBC # FLD: 17.9 %
RBC # FLD: 18.5 %
SODIUM SERPL-SCNC: 138 MMOL/L
WBC # FLD AUTO: 3.9 K/UL
WBC # FLD AUTO: 3.97 K/UL

## 2019-02-22 NOTE — ED ADULT NURSE NOTE - NSSISCREENINGQ5_ED_A_ED
Patient presents with rash bilateral arms for about 1 week. No shortness of breath. Patient thinks it may be from the oils at work.    No

## 2019-04-22 NOTE — ED ADULT NURSE NOTE - PAIN: NONVERBAL INDICATORS
Patient arrived to floor in stable condition. Placed patient on the monitor. Visualized patient and assessed patient's overall condition and appearance. Admit assessment initiated. No complaints. NAD noted. Will continue to monitor.   restless

## 2019-07-10 NOTE — ED PROVIDER NOTE - NS ED MD DISPO ISOLATION TYPES
fever, or chills. I spent 15 minutes face-to-face with the patient and greater than 50% that time was spent counseling/coordinating care for the above stated diagnosis and treatment. I have personally performed and/or participated in the history, exam and medical decision making and agree with all pertinent clinical information. I have also reviewed and agree with the past medical, family and social history unless otherwise noted. This dictation was performed with a verbal recognition program (DRAGON) and it was checked for errors. It is possible that there are still dictated errors within this office note. If so, please bring any errors to my attention for an addendum. All efforts were made to ensure that this office note is accurate.           Monalisa Blanco MD
None

## 2020-01-24 NOTE — CONSULT NOTE ADULT - I WAS PHYSICALLY PRESENT FOR THE KEY PORTIONS OF THE EVALUATION AND MANAGEMENT (E/M) SERVICE PROVIDED.  I AGREE WITH THE ABOVE HISTORY, PHYSICAL, AND PLAN WHICH I HAVE REVIEWED AND EDITED WHERE APPROPRIATE
Statement Selected
PMD,   Follow up with PMD  Phone: (   )    -  Fax: (   )    -  Follow Up Time: 1 week
Statement Selected

## 2021-02-06 NOTE — PROGRESS NOTE ADULT - PROVIDER SPECIALTY LIST ADULT
Heme/Onc TRANSFER TO FLOOR:

Patient transferred to  as ordered, per ER MD.   Report given to [place] [address] 
tele.  Belongings sent with pt. transfered with tele monitor. Transfered with 
paramedic and RN. No adverse events during transport.

## 2021-08-12 NOTE — ED ADULT NURSE NOTE - CAS TRG GEN SKIN COLOR
Normal for race [FreeTextEntry1] : continue off loading , wound care , observation Spent 20 minutes for patient care and medical decision making.\par

## 2022-01-29 NOTE — ED ADULT NURSE NOTE - PMH
Infectious Disease Acute monoblastic leukemia in remission    Breast CA    Gastrointestinal bleeding, lower  In january 2018

## 2022-12-16 NOTE — ED CDU PROVIDER INITIAL DAY NOTE - ATTENDING CONTRIBUTION TO CARE
66yo woman h/o leukemia, breast CA was placed in CDU for transfusion after outpt Hb was low. Pt has no complaints, feels at baseline. Denies dark stools/bloody stools. VS, exam as noted, pt nontoxic appearing and comfortable, lungs CTA, CVS1S2 RRR abd soft. ED Hb 6.4. Will transfuse, likely d/c. denies loose teeth/dentures

## 2023-02-01 NOTE — ED CDU PROVIDER INITIAL DAY NOTE - NS_OBSORDERDATE_ED_A_ED
From: Britany Rankin  To: Diana Quiroga  Sent: 1/31/2023 2:36 PM CST  Subject: Note    Good afternoon,  Vangie 12-Oct-2018 15:18

## 2023-08-06 NOTE — PROGRESS NOTE ADULT - NS NEC GEN PE MLT EXAM PC
No bruits; no thyromegaly or nodules
No bruits; no thyromegaly or nodules
none
No bruits; no thyromegaly or nodules

## 2023-09-06 NOTE — H&P ADULT - HISTORY OF PRESENT ILLNESS
Discussed that vaginal itching is probably multifactorial  Patient states she was previously prescribed Premarin  Also dicussed that Jardiance can also cause the symptoms  Suggested taking to her Endocrinology   Wash after every urination 66 yo female patient with PMHx of right breast cancer (Well to moderately differentiated invasive ductal carcinoma ER+, [place]+, Her-2 neg, stage T1c, N0, M0) s/p lumpectomy around 1.5 years ago, followed by radiation therapy and currently maintained on anastrazole, history of lower GI bleeding back in January 2018 which was attributed to hemorrhoids, s/p hemorrhoidectomy and fissurotomy, currently presented because of worsening generalized weakness, dyspnea on exertion and cough.  Patient in fact has multiple complaints:  1- She complains of few days of productive cough, clear phlegm, associated with left armpit pain, radiating down her left side, worse with coughing and with deep inspiration. Patient is a former smoker of around 50 pack year, stopped 4 months ago. Denies sore throat, rhinorrhea, fever...  2- Patient is also having generalized weakness and exertional shortness of breath, getting worse over the last month or so. 68 yo female patient with PMHx of right breast cancer (Well to moderately differentiated invasive ductal carcinoma ER+, [place]+, Her-2 neg, stage T1c, N0, M0) s/p lumpectomy around 1.5 years ago, followed by radiation therapy and currently maintained on anastrazole, history of lower GI bleeding back in January 2018 which was attributed to hemorrhoids, s/p hemorrhoidectomy and fissurotomy, currently presented because of worsening generalized weakness, dyspnea on exertion and cough.  Patient in fact has multiple complaints:  1- She complains of few days of productive cough, clear phlegm, associated with left armpit pain, radiating down her left side, worse with coughing and with deep inspiration. Patient is a former smoker of around 50 pack year, stopped 4 months ago. Denies sore throat, rhinorrhea, fever...  2- Patient is also having generalized weakness and exertional shortness of breath, getting worse over the last month or so.  3- Patient is complaining of intermittent headaches recently    In ED, patient was found to have severe anemia (Hg 5.8) and positive guaiac, admitted for evaluation.

## 2024-09-03 NOTE — ED PROVIDER NOTE - NS ED ATTENDING STATEMENT MOD
Care Due:                  Date            Visit Type   Department     Provider  --------------------------------------------------------------------------------                                MYCHART                              ANNUAL                              CHECKUP/PHY  [facility] INTERNAL  Last Visit: 04-      S            GLORIA Colon                              EP -                              PRIMARY      [facility] INTERNAL  Next Visit: 11-      CARE ([facility])   MEDICINE       Tj Colon                                                            Last  Test          Frequency    Reason                     Performed    Due Date  --------------------------------------------------------------------------------    HBA1C.......  6 months...  metFORMIN................  04-   10-    [facility] Embedded Care Due Messages. Reference number: [id number].   9/02/2024 11:22:02 PM CDT   I have personally seen and examined this patient.  I have fully participated in the care of this patient. I have reviewed all pertinent clinical information, including history, physical exam, plan and the Resident’s note and agree except as noted.
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
## 2025-05-28 NOTE — ED CDU PROVIDER INITIAL DAY NOTE - PMH
Acute monoblastic leukemia in remission    Breast CA    Gastrointestinal bleeding, lower  In january 2018 [FreeTextEntry2] : New patient - right thigh muscle pain